# Patient Record
Sex: FEMALE | Race: WHITE | NOT HISPANIC OR LATINO | Employment: FULL TIME | URBAN - NONMETROPOLITAN AREA
[De-identification: names, ages, dates, MRNs, and addresses within clinical notes are randomized per-mention and may not be internally consistent; named-entity substitution may affect disease eponyms.]

---

## 2017-01-20 ENCOUNTER — TELEPHONE (OUTPATIENT)
Dept: UROLOGY | Facility: CLINIC | Age: 42
End: 2017-01-20

## 2017-01-20 NOTE — TELEPHONE ENCOUNTER
----- Message from Susan Hartman sent at 1/20/2017 12:47 PM CST -----  Contact: PATIENT  PATIENT CALLED FOR HER RESULTS ON HER 24 HOUR URINE AND ALSO HER RESULTS FROM HER KIDNEY STONE ANALYSIS. SHE ALSO WANTED TO KNOW WHEN SHE NEEDED TO SCHEDULE A FOLLOWUP. I LOOKED AT HER LAST OFFICE VISIT AND IT SAID A YEAR BUT THE RESULTS HAVE BEEN SINCE THEN. SHE IS A [facility] PATIENT.

## 2017-01-20 NOTE — TELEPHONE ENCOUNTER
I spoke with the patient to advised that stones were calcium oxalate, went over dietary recommendations and advised the patient that she will follow up in a year unless she experiences any hematuria, fever, recurrent UTI's, flank pain.   Patient verbalized understanding. She stated that all questions were answered. Patient was advised to call our office if she had any further questions or concerns. I will mail her a copy of the dietary recommendations.

## 2017-01-31 ENCOUNTER — OFFICE VISIT (OUTPATIENT)
Dept: OBSTETRICS AND GYNECOLOGY | Facility: CLINIC | Age: 42
End: 2017-01-31

## 2017-01-31 VITALS
HEIGHT: 63 IN | SYSTOLIC BLOOD PRESSURE: 126 MMHG | WEIGHT: 222 LBS | BODY MASS INDEX: 39.34 KG/M2 | DIASTOLIC BLOOD PRESSURE: 84 MMHG

## 2017-01-31 DIAGNOSIS — R10.2 PELVIC PAIN: ICD-10-CM

## 2017-01-31 DIAGNOSIS — N80.9 ENDOMETRIOSIS: ICD-10-CM

## 2017-01-31 DIAGNOSIS — N92.0 MENORRHAGIA WITH REGULAR CYCLE: Primary | ICD-10-CM

## 2017-01-31 PROCEDURE — 99213 OFFICE O/P EST LOW 20 MIN: CPT | Performed by: OBSTETRICS & GYNECOLOGY

## 2017-01-31 NOTE — MR AVS SNAPSHOT
Breonna Bellamy   1/31/2017 11:15 AM   Office Visit    Dept Phone:  [phone]   Encounter #:  [id number]    Provider:  Jonathan Figueroa MD   Department:  [facility]                Your Full Care Plan              Your Updated Medication List          This list is accurate as of: 1/31/17 11:37 AM.  Always use your most recent med list.                dicyclomine 20 MG tablet   Commonly known as:  BENTYL       LORazepam 0.5 MG tablet   Commonly known as:  ATIVAN       omeprazole 20 MG capsule   Commonly known as:  priLOSEC       oxyCODONE-acetaminophen 7.5-325 MG per tablet   Commonly known as:  PERCOCET   Take 1-2 tablets by mouth every 4 (four) hours as needed (Pain).       propranolol 80 MG tablet   Commonly known as:  INDERAL       sucralfate 1 G tablet   Commonly known as:  CARAFATE       WELCHOL 625 MG tablet   Generic drug:  colesevelam               You Were Diagnosed With        Codes Comments    Menorrhagia with regular cycle    -  Primary ICD-10-CM: N92.0  ICD-9-CM: 626.2 Resolved after hysterectomy    Pelvic pain     ICD-10-CM: R10.2  ICD-9-CM: KFR7852 IMproved significantly    Endometriosis     ICD-10-CM: N80.9  ICD-9-CM: 617.9 Although ovaries left in situ, no symptoms of this currently.  Op note reviewed and adnexa adherent.  Hopefully, removal will never be indicated.      Instructions     None    Patient Instructions History      Upcoming Appointments     Visit Type Date Time Department    OFFICE VISIT 1/31/2017 11:15 AM [facility]    [facility]28/2017  3:00 [facility] [place] [facility]      Nu-B-2B Signup     [facility] Nu-B-2B allows you to send messages to your doctor, view your test results, renew your prescriptions, schedule appointments, and more. To sign up, go to "[employer] and click on the Sign Up Now link in the New User? box. Enter your Nu-B-2B Activation Code exactly as it appears below along with the last four digits of  "your Social Security Number and your Date of Birth () to complete the sign-up process. If you do not sign up before the expiration date, you must request a new code.    Univisiont Activation Code: [email or web address]  Expires: 2017 11:36 AM    If you have questions, you can email [email or web address] or call [phone] to talk to our Univ[employer] staff. Remember, [employer] is NOT to be used for urgent needs. For medical emergencies, dial 911.               Other Info from Your Visit           Your Appointments     Sep 28, 2017  3:00 PM CDT   Physical with Mary R Carrell, APRN   [facility] (--)    [address] [place] [id number] [phone]           Arrive 15 minutes prior to appointment.              Allergies     Other  Swelling    AVOCADO- EAR AND THROAT SWELLING    Sulfa Antibiotics  Anaphylaxis    Fish-derived Products  Hives, Nausea And Vomiting    Procardia [Nifedipine]  Swelling, Rash      Reason for Visit     Menstrual Problem Pt had her surgery last 2016 and is doing great. She voices no problems/complaints      Vital Signs     Blood Pressure Height Weight Last Menstrual Period Breastfeeding? Body Mass Index    126/84 63\" (160 cm) 222 lb (101 kg) 2016 No 39.33 kg/m2    Smoking Status                   Never Smoker           Problems and Diagnoses Noted     Endometriosis    Menorrhagia with regular cycle    -  Primary    Pelvic pain            "

## 2017-01-31 NOTE — PROGRESS NOTES
Subjective   Breonna Bellamy is a 41 y.o. female is here today for follow-up.  No dyspareunia or post coital bleeding.  Resolution of pelvic pain.  No MP symptoms.    Menstrual Problem   This is a chronic problem. The current episode started more than 1 year ago. The problem occurs rarely. The problem has been resolved. Pertinent negatives include no abdominal pain, anorexia, arthralgias, change in bowel habit, chest pain, chills, congestion, coughing, diaphoresis, fatigue, fever, headaches, joint swelling, myalgias, nausea, neck pain, numbness, rash, sore throat, swollen glands, urinary symptoms, vertigo, visual change, vomiting or weakness. Nothing aggravates the symptoms. Treatments tried: DVH. The treatment provided significant relief.       The following portions of the patient's history were reviewed and updated as appropriate: allergies, current medications, past family history, past medical history, past social history, past surgical history and problem list.    Review of Systems   Constitutional: Negative for chills, diaphoresis, fatigue and fever.   HENT: Negative for congestion and sore throat.    Eyes: Negative for photophobia and visual disturbance.   Respiratory: Negative for cough.    Cardiovascular: Negative for chest pain.   Gastrointestinal: Negative for abdominal pain, anorexia, change in bowel habit, nausea and vomiting.   Endocrine: Negative for cold intolerance and heat intolerance.   Genitourinary: Negative for dyspareunia, menstrual problem and pelvic pain.   Musculoskeletal: Negative for arthralgias, joint swelling, myalgias and neck pain.   Skin: Negative for rash.   Neurological: Negative for vertigo, weakness, numbness and headaches.   Psychiatric/Behavioral: Negative for confusion, decreased concentration and dysphoric mood.       Objective   Physical Exam   Constitutional: She is oriented to person, place, and time. She appears well-developed and well-nourished.   Neck: Normal range of  motion. Neck supple.   Cardiovascular: Normal rate.    Abdominal:       LSC incisions well healed without evidence of hernia   Neurological: She is alert and oriented to person, place, and time.   Skin: Skin is warm and dry.   Psychiatric: She has a normal mood and affect. Her behavior is normal. Judgment and thought content normal.         Assessment/Plan   Breonna was seen today for menstrual problem.    Diagnoses and all orders for this visit:    Menorrhagia with regular cycle  Comments:  Resolved after hysterectomy    Pelvic pain  Comments:  IMproved significantly    Endometriosis  Comments:  Although ovaries left in situ, no symptoms of this currently.  Op note reviewed and adnexa adherent.  Hopefully, removal will never be indicated.        Jonathan Figueroa MD

## 2017-02-02 ENCOUNTER — RESULTS ENCOUNTER (OUTPATIENT)
Dept: UROLOGY | Facility: CLINIC | Age: 42
End: 2017-02-02

## 2017-02-02 DIAGNOSIS — R10.9 FLANK PAIN: ICD-10-CM

## 2017-02-02 DIAGNOSIS — N20.0 RECURRENT KIDNEY STONES: ICD-10-CM

## 2017-08-29 ENCOUNTER — OFFICE VISIT (OUTPATIENT)
Dept: OTOLARYNGOLOGY | Facility: CLINIC | Age: 42
End: 2017-08-29

## 2017-08-29 VITALS
TEMPERATURE: 97.8 F | HEART RATE: 80 BPM | BODY MASS INDEX: 40.93 KG/M2 | SYSTOLIC BLOOD PRESSURE: 130 MMHG | HEIGHT: 63 IN | WEIGHT: 231 LBS | DIASTOLIC BLOOD PRESSURE: 78 MMHG

## 2017-08-29 DIAGNOSIS — E66.01 MORBID OBESITY WITH BMI OF 40.0-44.9, ADULT (HCC): ICD-10-CM

## 2017-08-29 DIAGNOSIS — R13.14 PHARYNGOESOPHAGEAL DYSPHAGIA: ICD-10-CM

## 2017-08-29 DIAGNOSIS — K21.9 LARYNGOPHARYNGEAL REFLUX: Primary | ICD-10-CM

## 2017-08-29 PROCEDURE — 31575 DIAGNOSTIC LARYNGOSCOPY: CPT | Performed by: OTOLARYNGOLOGY

## 2017-08-29 PROCEDURE — 99214 OFFICE O/P EST MOD 30 MIN: CPT | Performed by: OTOLARYNGOLOGY

## 2017-08-29 RX ORDER — BUSPIRONE HYDROCHLORIDE 10 MG/1
10 TABLET ORAL AS NEEDED
COMMUNITY

## 2017-08-29 NOTE — PROGRESS NOTES
YOB: 1975  Location: [facility]  Location Address: [address] [place] [address], [facility], [address] [place] [address]  Location Phone: [phone]    Chief Complaint   Patient presents with   • Lymph nodes     enlarged, gets choked       History of Present Illness  Breonna Bellamy is a 42 y.o. female.  Breonna Bellamy is here for evaluation of ENT complaints. The patient has had problems with enlarged lymph nodes. She also has complaints of dysphagia, globus sensation, hoarseness and severe reflux disease. Patient denies sore throat, cough, nasal congestion, nasal drainage and neck tenderness and swelling. She states she has a history of IBS which irritates the reflux. Patient has had a CT scan done.     Patient reports gaining approximately 60 pounds back after her hysterectomy last year.  This is when most of her difficulties began.  However since that time she has still been working with a  twice a week and despite her efforts been unable to lose weight after her hysterectomy.  She is taking her nighttime dose of PPI prior to bedtime.        CT reviewed-No significant masses or lesions noted.         Past Medical History:   Diagnosis Date   • Abnormal uterine bleeding    • Dysmenorrhea    • Hypertension    • IBS (irritable bowel syndrome)    • IBS (irritable bowel syndrome)    • Infertility, female    • Irregular menstruation    • Kidney stones    • Kidney stones    • LGSIL (low grade squamous intraepithelial dysplasia)    • Polycystic ovarian disease    • PONV (postoperative nausea and vomiting)    • Spontaneous      x2   • Tachycardia        Past Surgical History:   Procedure Laterality Date   • CHOLECYSTECTOMY     • COLONOSCOPY     • CYSTOSCOPY N/A 2016    Procedure: CYSTOSCOPY;  Surgeon: Jonathan Figueroa MD;  Location: [facility];  Service:    • CYSTOSCOPY ELECTROHYDRAULIC LITHOTRIPSY     • EYE SURGERY      LASIK   • HYSTEROSCOPY ENDOMETRIAL ABLATION      X2   • KIDNEY STONE SURGERY      removed    • TOTAL LAPAROSCOPIC HYSTERECTOMY N/A 9/19/2016    Procedure: TOTAL LAPAROSCOPIC HYSTERECTOMY WITH DAVINCI SI ROBOT WITH CYSTO;  Surgeon: Jonathan Figueroa MD;  Location: [facility];  Service:          Current Outpatient Prescriptions:   •  busPIRone (BUSPAR) 10 MG tablet, Take 10 mg by mouth As Needed., Disp: , Rfl:   •  colesevelam (WELCHOL) 625 MG tablet, Take 1,875 mg by mouth as needed., Disp: , Rfl:   •  dicyclomine (BENTYL) 20 MG tablet, Take 40 mg by mouth every night., Disp: , Rfl:   •  omeprazole (PriLOSEC) 20 MG capsule, Take 20 mg by mouth 2 (two) times a day as needed., Disp: , Rfl:   •  propranolol (INDERAL) 80 MG tablet, Take 80 mg by mouth 3 (three) times a day., Disp: , Rfl:   •  sucralfate (CARAFATE) 1 G tablet, Take 1 g by mouth as needed., Disp: , Rfl:     Other; Sulfa antibiotics; Fish-derived products; and Procardia [nifedipine]    Family History   Problem Relation Age of Onset   • Anesthesia problems Mother    • Anesthesia problems Maternal Grandmother    • Colon cancer Paternal Grandmother    • Liver cancer Paternal Grandfather    • Ovarian cancer Neg Hx    • Breast cancer Neg Hx        Social History     Social History   • Marital status:      Spouse name: N/A   • Number of children: N/A   • Years of education: N/A     Occupational History   • Not on file.     Social History Main Topics   • Smoking status: Never Smoker   • Smokeless tobacco: Not on file   • Alcohol use No      Comment: occasional   • Drug use: No   • Sexual activity: Not Currently     Other Topics Concern   • Not on file     Social History Narrative       Review of Systems   Constitutional: Negative.    HENT: Positive for trouble swallowing and voice change.         Globus sensation   Eyes: Negative.    Respiratory: Positive for choking.    Cardiovascular: Negative.    Gastrointestinal: Negative.    Endocrine: Negative.    Genitourinary: Negative.    Musculoskeletal: Negative.    Skin: Negative.     Allergic/Immunologic: Negative.    Neurological: Negative.    Hematological: Negative.    Psychiatric/Behavioral: Negative.        Vitals:    08/29/17 1308   BP: 130/78   Pulse: 80   Temp: 97.8 °F (36.6 °C)       Objective     Physical Exam  CONSTITUTIONAL: Morbidly obese, alert, oriented, in no acute distress     COMMUNICATION AND VOICE: able to communicate normally, normal voice quality    HEAD: normocephalic, no lesions, atraumatic, no tenderness, no masses     FACE: appearance normal, no lesions, no tenderness, no deformities, facial motion symmetric    SALIVARY GLANDS: parotid glands with no tenderness, no swelling, no masses, submandibular glands with normal size, nontender    EYES: ocular motility normal, eyelids normal, orbits normal, no proptosis, conjunctiva normal , pupils equal, round     EARS:  Hearing: response to conversational voice normal bilaterally   External Ears: auricles without lesions  Otoscopic: tympanic membrane appearance normal, no lesions, no perforation, normal mobility, no fluid    NOSE:  External Nose: structure normal, no tenderness on palpation, no nasal discharge, no lesions, no evidence of trauma, nostrils patent   Intranasal Exam: nasal mucosa normal, vestibule within normal limits, inferior turbinate normal, nasal septum midline   Nasopharynx:     ORAL:  Lips: upper and lower lips without lesion   Teeth: dentition within normal limits for age   Gums: gingivae healthy   Oral Mucosa: oral mucosa normal, no mucosal lesions   Floor of Mouth: Warthin’s duct patent, mucosa normal  Tongue: lingual mucosa normal without lesions, normal tongue mobility   Palate: soft and hard palates with normal mucosa and structure  Oropharynx: oropharyngeal mucosa normal    HYPOPHARYNX:   LARYNX: See endo    NECK: neck appearance normal, no mass,  noted without erythema or tenderness    THYROID: no overt thyromegaly, no tenderness, nodules or mass present on palpation, position midline     LYMPH  NODES: no lymphadenopathy    CHEST/RESPIRATORY: respiratory effort normal, normal breath sounds     CARDIOVASCULAR: rate and rhythm normal, extremities without cyanosis or edema      NEUROLOGIC/PSYCHIATRIC: oriented to time, place and person, mood normal, affect appropriate, CN II-XII intact grossly    Assessment/Plan   Breonna was seen today for lymph nodes.    Diagnoses and all orders for this visit:    Laryngopharyngeal reflux    Pharyngoesophageal dysphagia    Morbid obesity with BMI of 40.0-44.9, adult      * Surgery not found *  No orders of the defined types were placed in this encounter.    Return in about 3 months (around 11/29/2017) for Recheck.       Patient Instructions   Call or return for problems  Consider trying a elemental Paleo Diet -H/O    Risks of continued PPI as well as long-term were discussed  Change p.m. dose to before dinner rather than before bedtime  General reflux precautions      Recommendation was made to consider a Paleo Diet.      It Starts with Food - Mario and Suzanne Smith was recommended.  The basics of a Paleo diet was covered including a diet composed of meat, fruits and non-leguminous vegetables.  It is important to avoid all processed foods.  This requires that you not eat ANYTHING with a chemical preservative.    The Paleo lifestyle is about nourishing our bodies with real food that is grown and raised as nature intended, not manufactured in a facility.  It is about unplugging from the modern day electronics from time to time and giving your body a chance to actually rest.    It is important to stick very close to this diet for 6 weeks without significant cheating.  It allows you to experience the benefit of eating this way, giving your body time to detoxify.    Acceptable foods  Fresh Fish/seafood  Fresh meats-preferably grass-fed and free range  Fresh fruits  Fresh vegetables  Healthy fats-Coconut oil, olive oil, avocados etc.  Nuts/seeds    Foods to  avoid  Grains  Legumes  Processed foods  Soy  Refined sugar    Web sites include:  www.PrimalBody-PrimalMind.com  www.EverydayPaleo.SaveUp  www.IumeFansUnite.SaveUp  Www.PaleSan [place]cos Springs.SaveUp    Other Ariana Resources:  Bhargavi Mac  PrimalPaleo  Paleo [place]  Nourished      Other Recommended Books:  The Paleo Solution  Wheat Belly  Grain Brain  Primal Body/Primal Mind

## 2017-08-29 NOTE — PATIENT INSTRUCTIONS
Call or return for problems  Consider trying a elemental Paleo Diet -H/O    Risks of continued PPI as well as long-term were discussed  Change p.m. dose to before dinner rather than before bedtime  General reflux precautions      Recommendation was made to consider a Paleo Diet.      It Starts with Food - Mario and Suzanne Smith was recommended.  The basics of a Paleo diet was covered including a diet composed of meat, fruits and non-leguminous vegetables.  It is important to avoid all processed foods.  This requires that you not eat ANYTHING with a chemical preservative.    The Paleo lifestyle is about nourishing our bodies with real food that is grown and raised as nature intended, not manufactured in a facility.  It is about unplugging from the modern day electronics from time to time and giving your body a chance to actually rest.    It is important to stick very close to this diet for 6 weeks without significant cheating.  It allows you to experience the benefit of eating this way, giving your body time to detoxify.    Acceptable foods  Fresh Fish/seafood  Fresh meats-preferably grass-fed and free range  Fresh fruits  Fresh vegetables  Healthy fats-Coconut oil, olive oil, avocados etc.  Nuts/seeds    Foods to avoid  Grains  Legumes  Processed foods  Soy  Refined sugar    Web sites include:  www.PrimalBodyTau TherapeuticsPrimalMind.Extreme Reach  www.EverydayOregon Health & Science University.Extreme Reach  www.[employer]  Www.PaleProvasculon.Extreme Reach    Other Ariana Resources:  Bhargavi Mac  PrimalPalemarilu  Paleo [place]  Nourished      Other Recommended Books:  The Paleo Solution  Wheat Belly  Grain Brain  Primal Body/Primal Mind

## 2017-08-29 NOTE — PROGRESS NOTES
OPERATIVE NOTE:  Breonna Bellamy    DATE OF PROCEDURE: 08/29/2017    PROCEDURE:   Flexible Fiberoptic Laryngoscopy    ANESTHESIA:  None    REASON FOR PROCEDURE:  Procedure was recommend for globus sensation  Risks, benefits and alternatives were discussed.      DETAILS of OPERATION:  The patient was seated in the exam chair.  A flexible fiberoptic laryngoscopy was performed through the oral cavity.  The scope was introduced into the oral cavity and directed to the level of the glottis, examining the structures of the oropharynx, base of tongue, vallecula, supraglottic larynx, glottic larynx, and hypopharynx.      FINDINGS:  Mucosal surfaces:   The mucosal surfaces demonstrated normal mucosa surfaces with mild inflammation    Base of tongue:  The base of tongue was found to have no mass or lesion.    Epiglottis:  The epiglottis was found to have no mass or lesion.    Aryepligottic fold:  The AE folds were found to have no mass or lesion.    False Vocal Fold:  The false cords were found to have no mass or lesion.    True Vocal Cord:  The true vocal cords were found to have no mass or lesion.    Arytenoid:   The arytenoids were found to have mild to moderate inter-arytenoid edema with erythema.    Hypopharynx:  The hypopharynx was found to have no mass or lesion.    The patient tolerated procedure well.

## 2017-10-03 ENCOUNTER — OFFICE VISIT (OUTPATIENT)
Dept: OBSTETRICS AND GYNECOLOGY | Facility: CLINIC | Age: 42
End: 2017-10-03

## 2017-10-03 VITALS
SYSTOLIC BLOOD PRESSURE: 128 MMHG | DIASTOLIC BLOOD PRESSURE: 68 MMHG | BODY MASS INDEX: 40.75 KG/M2 | HEIGHT: 63 IN | WEIGHT: 230 LBS

## 2017-10-03 DIAGNOSIS — E55.9 VITAMIN D DEFICIENCY: ICD-10-CM

## 2017-10-03 DIAGNOSIS — Z12.31 ENCOUNTER FOR SCREENING MAMMOGRAM FOR MALIGNANT NEOPLASM OF BREAST: ICD-10-CM

## 2017-10-03 DIAGNOSIS — N95.1 MENOPAUSAL SYMPTOMS: ICD-10-CM

## 2017-10-03 DIAGNOSIS — Z01.419 ENCOUNTER FOR GYNECOLOGICAL EXAMINATION WITHOUT ABNORMAL FINDING: Primary | ICD-10-CM

## 2017-10-03 DIAGNOSIS — N94.10 DYSPAREUNIA, FEMALE: ICD-10-CM

## 2017-10-03 PROCEDURE — 87481 CANDIDA DNA AMP PROBE: CPT | Performed by: NURSE PRACTITIONER

## 2017-10-03 PROCEDURE — 99396 PREV VISIT EST AGE 40-64: CPT | Performed by: NURSE PRACTITIONER

## 2017-10-03 PROCEDURE — 87798 DETECT AGENT NOS DNA AMP: CPT | Performed by: NURSE PRACTITIONER

## 2017-10-03 PROCEDURE — 87512 GARDNER VAG DNA QUANT: CPT | Performed by: NURSE PRACTITIONER

## 2017-10-03 PROCEDURE — 87661 TRICHOMONAS VAGINALIS AMPLIF: CPT | Performed by: NURSE PRACTITIONER

## 2017-10-03 RX ORDER — MELATONIN
1000 DAILY
COMMUNITY

## 2017-10-03 RX ORDER — PROPRANOLOL HYDROCHLORIDE 80 MG/1
CAPSULE, EXTENDED RELEASE ORAL
COMMUNITY
Start: 2017-09-06 | End: 2021-07-23

## 2017-10-03 NOTE — PROGRESS NOTES
"Subjective   Breonna Bellamy is a 42 y.o. female.     HPI Comments: Annual exam.     Mood swings, PMS, breast tenderness, and decreased sex drive.     The following portions of the patient's history were reviewed and updated as appropriate: allergies, current medications, past family history, past medical history, past social history, past surgical history and problem list.    /68 (BP Location: Left arm, Patient Position: Sitting)  Ht 63\" (160 cm)  Wt 230 lb (104 kg)  LMP 09/13/2016 Comment: negative hcg noted to chart  BMI 40.74 kg/m2    Review of Systems   Constitutional: Negative for activity change, appetite change, fatigue and fever.   HENT: Negative for congestion and trouble swallowing.    Eyes: Negative for pain, discharge and visual disturbance.   Respiratory: Negative for apnea, shortness of breath and wheezing.    Cardiovascular: Negative for chest pain, palpitations and leg swelling.   Gastrointestinal: Positive for diarrhea (IBS, followed by PCP). Negative for abdominal pain and constipation.   Genitourinary: Positive for dyspareunia (for the last 6 months, vaginal dryness) and vaginal discharge (for approx 6 months). Negative for dysuria, frequency, pelvic pain and urgency.        Breast tenderness, decreased sex drive   Musculoskeletal: Negative for back pain and gait problem.   Skin: Negative for color change and rash.   Neurological: Negative for dizziness, weakness and numbness.   Psychiatric/Behavioral: Negative for confusion, self-injury, sleep disturbance and suicidal ideas. The patient is nervous/anxious (takes Buspar, prescribed by Dr. Hernandez).         PMS, mood swings.        Objective   Physical Exam   Constitutional: She is oriented to person, place, and time. She appears well-developed and well-nourished.   HENT:   Head: Normocephalic and atraumatic.   Right Ear: External ear normal.   Left Ear: External ear normal.   Eyes: Conjunctivae and EOM are normal. Right eye exhibits no " discharge. Left eye exhibits no discharge. No scleral icterus.   Neck: Normal range of motion. Neck supple. Carotid bruit is not present. No thyromegaly present.   Cardiovascular: Regular rhythm and normal heart sounds.    No murmur heard.  Pulmonary/Chest: Effort normal and breath sounds normal. No respiratory distress. Right breast exhibits no inverted nipple, no mass, no nipple discharge, no skin change and no tenderness. Left breast exhibits no inverted nipple, no mass, no nipple discharge, no skin change and no tenderness. Breasts are symmetrical. There is no breast swelling.   Abdominal: Soft. Bowel sounds are normal. She exhibits no distension and no mass. There is no tenderness. There is no guarding. No hernia. Hernia confirmed negative in the right inguinal area and confirmed negative in the left inguinal area.   Genitourinary: Rectal exam shows no mass. No breast tenderness, discharge or bleeding. There is no rash, tenderness, lesion or injury on the right labia. There is no rash, tenderness, lesion or injury on the left labia. No erythema, tenderness or bleeding in the vagina. No signs of injury around the vagina. Vaginal discharge found.   Genitourinary Comments: Cervix and uterus are surgically absent.  Urethra and urethral meatus normal  Bladder, normal no prolapse  Perineum and anus examined and without lesions   Musculoskeletal: Normal range of motion. She exhibits no edema or tenderness.   Lymphadenopathy:        Head (right side): No submental, no submandibular, no tonsillar, no preauricular, no posterior auricular and no occipital adenopathy present.        Head (left side): No submental, no submandibular, no tonsillar, no preauricular, no posterior auricular and no occipital adenopathy present.     She has no cervical adenopathy.        Right cervical: No superficial cervical, no deep cervical and no posterior cervical adenopathy present.       Left cervical: No superficial cervical, no deep  cervical and no posterior cervical adenopathy present.     She has no axillary adenopathy.        Right: No inguinal adenopathy present.        Left: No inguinal adenopathy present.   Neurological: She is alert and oriented to person, place, and time. She exhibits normal muscle tone. Coordination normal.   Skin: Skin is warm and dry. No bruising and no rash noted. No erythema.   Psychiatric: She has a normal mood and affect. Her behavior is normal. Judgment and thought content normal.   Nursing note and vitals reviewed.      Assessment/Plan    Well woman exam. Specimen collected for BV panel. Mammogram ordered. Annual and hormone labs ordered.     Discussed pt increased stress, decreased sex drive and moodiness. Encouraged pt to take Buspar as prescribed by PCP.   Discussed proper amount of sleep, exercise and time for herself in order for her to have the energy to care for others.   Pt voiced understanding.     Further treatment based on results.   RV annual exam/prn  Breonna was seen today for gynecologic exam.    Diagnoses and all orders for this visit:    Encounter for gynecological examination without abnormal finding  -     CBC & Differential  -     Comprehensive Metabolic Panel  -     Hemoglobin A1c  -     Lipid Panel With LDL / HDL Ratio  -     T4, Free  -     TSH    Menopausal symptoms  -     Cortisol  -     DHEA-Sulfate  -     Follicle Stimulating Hormone  -     Estradiol  -     Luteinizing Hormone  -     Testosterone  -     Progesterone  -     Gynecologic Fluid, Supplemental Testing - ThinPrep Vial, Cervix    Dyspareunia, female  -     Gynecologic Fluid, Supplemental Testing - ThinPrep Vial, Cervix    Vitamin D deficiency  -     Vitamin D 25 Hydroxy    Encounter for screening mammogram for malignant neoplasm of breast  -     Mammo Screening Digital Tomosynthesis Bilateral With CAD; Future

## 2017-10-04 LAB
25(OH)D3+25(OH)D2 SERPL-MCNC: 19.1 NG/ML (ref 30–100)
ALBUMIN SERPL-MCNC: 4.2 G/DL (ref 3.5–5)
ALBUMIN/GLOB SERPL: 1.2 G/DL (ref 1.1–2.5)
ALP SERPL-CCNC: 80 U/L (ref 24–120)
ALT SERPL-CCNC: 28 U/L (ref 0–54)
AST SERPL-CCNC: 20 U/L (ref 7–45)
BASOPHILS # BLD AUTO: 0.03 10*3/MM3 (ref 0–0.2)
BASOPHILS NFR BLD AUTO: 0.2 % (ref 0–2)
BILIRUB SERPL-MCNC: 0.3 MG/DL (ref 0.1–1)
BUN SERPL-MCNC: 12 MG/DL (ref 5–21)
BUN/CREAT SERPL: 15.2 (ref 7–25)
CALCIUM SERPL-MCNC: 9.7 MG/DL (ref 8.4–10.4)
CHLORIDE SERPL-SCNC: 103 MMOL/L (ref 98–110)
CHOLEST SERPL-MCNC: 207 MG/DL (ref 130–200)
CO2 SERPL-SCNC: 25 MMOL/L (ref 24–31)
CORTIS SERPL-MCNC: 9.5 UG/DL
CREAT SERPL-MCNC: 0.79 MG/DL (ref 0.5–1.4)
DHEA-S SERPL-MCNC: 122.5 UG/DL (ref 57.3–279.2)
EOSINOPHIL # BLD AUTO: 0.17 10*3/MM3 (ref 0–0.7)
EOSINOPHIL NFR BLD AUTO: 1.3 % (ref 0–4)
ERYTHROCYTE [DISTWIDTH] IN BLOOD BY AUTOMATED COUNT: 14 % (ref 12–15)
ESTRADIOL SERPL-MCNC: 77.8 PG/ML
FSH SERPL-ACNC: 6.9 MIU/ML
GLOBULIN SER CALC-MCNC: 3.5 GM/DL
GLUCOSE SERPL-MCNC: 93 MG/DL (ref 70–100)
HBA1C MFR BLD: 5.8 %
HCT VFR BLD AUTO: 41.2 % (ref 37–47)
HDLC SERPL-MCNC: 45 MG/DL
HGB BLD-MCNC: 13.3 G/DL (ref 12–16)
IMM GRANULOCYTES # BLD: 0.07 10*3/MM3 (ref 0–0.03)
IMM GRANULOCYTES NFR BLD: 0.5 % (ref 0–5)
LDLC SERPL CALC-MCNC: 94 MG/DL (ref 0–99)
LDLC/HDLC SERPL: 2.09 {RATIO}
LH SERPL-ACNC: 8.1 MIU/ML
LYMPHOCYTES # BLD AUTO: 3.37 10*3/MM3 (ref 0.72–4.86)
LYMPHOCYTES NFR BLD AUTO: 26 % (ref 15–45)
MCH RBC QN AUTO: 27.8 PG (ref 28–32)
MCHC RBC AUTO-ENTMCNC: 32.3 G/DL (ref 33–36)
MCV RBC AUTO: 86 FL (ref 82–98)
MONOCYTES # BLD AUTO: 0.65 10*3/MM3 (ref 0.19–1.3)
MONOCYTES NFR BLD AUTO: 5 % (ref 4–12)
NEUTROPHILS # BLD AUTO: 8.67 10*3/MM3 (ref 1.87–8.4)
NEUTROPHILS NFR BLD AUTO: 67 % (ref 39–78)
PLATELET # BLD AUTO: 375 10*3/MM3 (ref 130–400)
POTASSIUM SERPL-SCNC: 4.4 MMOL/L (ref 3.5–5.3)
PROGEST SERPL-MCNC: 0.4 NG/ML
PROT SERPL-MCNC: 7.7 G/DL (ref 6.3–8.7)
RBC # BLD AUTO: 4.79 10*6/MM3 (ref 4.2–5.4)
SODIUM SERPL-SCNC: 140 MMOL/L (ref 135–145)
T4 FREE SERPL-MCNC: 1.4 NG/DL (ref 0.78–2.19)
TESTOST SERPL-MCNC: 34 NG/DL (ref 8–48)
TRIGL SERPL-MCNC: 340 MG/DL (ref 0–149)
TSH SERPL DL<=0.005 MIU/L-ACNC: 0.75 MIU/ML (ref 0.47–4.68)
VLDLC SERPL CALC-MCNC: 68 MG/DL
WBC # BLD AUTO: 12.96 10*3/MM3 (ref 4.8–10.8)

## 2017-10-04 RX ORDER — ERGOCALCIFEROL 1.25 MG/1
50000 CAPSULE ORAL WEEKLY
Qty: 30 CAPSULE | Refills: 0 | Status: SHIPPED | OUTPATIENT
Start: 2017-10-04 | End: 2018-10-18 | Stop reason: SDUPTHER

## 2017-10-05 LAB
GEN CATEG CVX/VAG CYTO-IMP: NORMAL
LAB AP CASE REPORT: NORMAL
Lab: NORMAL
PATH INTERP SPEC-IMP: NORMAL
STAT OF ADQ CVX/VAG CYTO-IMP: NORMAL

## 2017-10-09 RX ORDER — FLUCONAZOLE 150 MG/1
150 TABLET ORAL 2 TIMES WEEKLY
Qty: 2 TABLET | Refills: 0 | Status: SHIPPED | OUTPATIENT
Start: 2017-10-09 | End: 2018-02-05

## 2017-11-28 ENCOUNTER — TELEPHONE (OUTPATIENT)
Dept: OBSTETRICS AND GYNECOLOGY | Facility: CLINIC | Age: 42
End: 2017-11-28

## 2017-11-28 ENCOUNTER — HOSPITAL ENCOUNTER (OUTPATIENT)
Dept: MAMMOGRAPHY | Facility: HOSPITAL | Age: 42
Discharge: HOME | End: 2017-11-28
Admitting: NURSE PRACTITIONER

## 2017-11-28 DIAGNOSIS — Z12.31 ENCOUNTER FOR SCREENING MAMMOGRAM FOR MALIGNANT NEOPLASM OF BREAST: ICD-10-CM

## 2017-11-28 PROCEDURE — 77063 BREAST TOMOSYNTHESIS BI: CPT

## 2017-11-28 PROCEDURE — G0202 SCR MAMMO BI INCL CAD: HCPCS

## 2017-11-28 NOTE — TELEPHONE ENCOUNTER
Called pt to advise her that she came in to early to have her labs drawn,  She will need to come in January to have her labs drawn  The labs that was drawn today will not show if there has been any changes.

## 2018-02-05 ENCOUNTER — HOSPITAL ENCOUNTER (OUTPATIENT)
Dept: GENERAL RADIOLOGY | Facility: HOSPITAL | Age: 43
Discharge: HOME | End: 2018-02-05
Attending: UROLOGY | Admitting: UROLOGY

## 2018-02-05 ENCOUNTER — OFFICE VISIT (OUTPATIENT)
Dept: UROLOGY | Facility: CLINIC | Age: 43
End: 2018-02-05

## 2018-02-05 VITALS — HEIGHT: 63 IN | TEMPERATURE: 97.1 F | WEIGHT: 227 LBS | BODY MASS INDEX: 40.22 KG/M2

## 2018-02-05 DIAGNOSIS — N20.0 RECURRENT KIDNEY STONES: Primary | ICD-10-CM

## 2018-02-05 LAB
BILIRUB BLD-MCNC: NEGATIVE MG/DL
CLARITY, POC: CLEAR
COLOR UR: YELLOW
GLUCOSE UR STRIP-MCNC: NEGATIVE MG/DL
KETONES UR QL: NEGATIVE
LEUKOCYTE EST, POC: NEGATIVE
NITRITE UR-MCNC: NEGATIVE MG/ML
PH UR: 5.5 [PH] (ref 5–8)
PROT UR STRIP-MCNC: NEGATIVE MG/DL
RBC # UR STRIP: NEGATIVE /UL
SP GR UR: 1.02 (ref 1–1.03)
UROBILINOGEN UR QL: NORMAL

## 2018-02-05 PROCEDURE — 74018 RADEX ABDOMEN 1 VIEW: CPT

## 2018-02-05 PROCEDURE — 81003 URINALYSIS AUTO W/O SCOPE: CPT | Performed by: UROLOGY

## 2018-02-05 PROCEDURE — 99213 OFFICE O/P EST LOW 20 MIN: CPT | Performed by: UROLOGY

## 2018-02-05 NOTE — PROGRESS NOTES
Ms. Bellamy is 42 y.o. female    CHIEF COMPLAINT: I am here to follow up on my kidney stones. I am having right flank pain    HPI  Renal Urolithiasis  The patient presents with right renal urolithiasis. This was initially diagnosed approximately 6 months ago. This was identified on CT that included abdominal cuts. This was found in the context of an evaluation of microscopic hematuria. This is a(n) established problem for the patient. The onset of this was unknown. The course is stable. The patient is currently being managed by dietary alterations and hydration. Current symptoms that may or may not be related to this stone include none reported.    The following portions of the patient's history were reviewed and updated as appropriate: allergies, current medications, past family history, past medical history, past social history, past surgical history and problem list.    Review of Systems   Constitutional: Negative for chills and fever.   Gastrointestinal: Negative for abdominal pain, anal bleeding and blood in stool.   Genitourinary: Negative for flank pain and hematuria.         Current Outpatient Prescriptions:   •  busPIRone (BUSPAR) 10 MG tablet, Take 10 mg by mouth As Needed., Disp: , Rfl:   •  cholecalciferol (VITAMIN D3) 1000 units tablet, Take 1,000 Units by mouth Daily., Disp: , Rfl:   •  colesevelam (WELCHOL) 625 MG tablet, Take 1,875 mg by mouth as needed., Disp: , Rfl:   •  dicyclomine (BENTYL) 20 MG tablet, Take 40 mg by mouth every night., Disp: , Rfl:   •  omeprazole (PriLOSEC) 20 MG capsule, Take 20 mg by mouth 2 (two) times a day as needed., Disp: , Rfl:   •  propranolol LA (INDERAL LA) 80 MG 24 hr capsule, , Disp: , Rfl:   •  sucralfate (CARAFATE) 1 G tablet, Take 1 g by mouth as needed., Disp: , Rfl:   •  vitamin D (ERGOCALCIFEROL) 49008 units capsule capsule, Take 1 capsule by mouth 1 (One) Time Per Week., Disp: 30 capsule, Rfl: 0    Past Medical History:   Diagnosis Date   • Abnormal uterine  "bleeding    • Dysmenorrhea    • Hypertension    • IBS (irritable bowel syndrome)    • IBS (irritable bowel syndrome)    • IBS (irritable bowel syndrome)    • Infertility, female    • Irregular menstruation    • Kidney stones    • Kidney stones    • LGSIL (low grade squamous intraepithelial dysplasia)    • Polycystic ovarian disease    • PONV (postoperative nausea and vomiting)    • Spontaneous      x2   • Tachycardia        Past Surgical History:   Procedure Laterality Date   • CHOLECYSTECTOMY     • COLONOSCOPY     • CYSTOSCOPY N/A 2016    Procedure: CYSTOSCOPY;  Surgeon: Jonathan Figueroa MD;  Location: [facility];  Service:    • CYSTOSCOPY ELECTROHYDRAULIC LITHOTRIPSY     • EYE SURGERY      LASIK   • HYSTEROSCOPY ENDOMETRIAL ABLATION      X2   • KIDNEY STONE SURGERY      removed   • TOTAL LAPAROSCOPIC HYSTERECTOMY N/A 2016    Procedure: TOTAL LAPAROSCOPIC HYSTERECTOMY WITH DAVINCI SI ROBOT WITH CYSTO;  Surgeon: Jonathan Figueroa MD;  Location: [facility];  Service:        Social History     Social History   • Marital status:      Spouse name: N/A   • Number of children: N/A   • Years of education: N/A     Social History Main Topics   • Smoking status: Never Smoker   • Smokeless tobacco: Never Used   • Alcohol use No      Comment: occasional   • Drug use: No   • Sexual activity: Yes     Partners: Male     Birth control/ protection: None     Other Topics Concern   • None     Social History Narrative       Family History   Problem Relation Age of Onset   • Anesthesia problems Mother    • Anesthesia problems Maternal Grandmother    • Colon cancer Paternal Grandmother    • Liver cancer Paternal Grandfather    • Ovarian cancer Neg Hx    • Breast cancer Neg Hx        Temp 97.1 °F (36.2 °C)  Ht 160 cm (63\")  Wt 103 kg (227 lb)  LMP 2016 Comment: negative hcg noted to chart  BMI 40.21 kg/m2    Physical Exam  Alert and oriented ×3  Not agitated or distressed  No obvious deformities  No " respiratory distress  Skin without pallor or diaphoresis      Results for orders placed or performed in visit on 02/05/18   POC Urinalysis Dipstick, Automated   Result Value Ref Range    Color Yellow Yellow, Straw, Dark Yellow, Brenda    Clarity, UA Clear Clear    Glucose, UA Negative Negative, 1000 mg/dL (3+) mg/dL    Bilirubin Negative Negative    Ketones, UA Negative Negative    Specific Gravity  1.025 1.005 - 1.030    Blood, UA Negative Negative    pH, Urine 5.5 5.0 - 8.0    Protein, POC Negative Negative mg/dL    Urobilinogen, UA Normal Normal    Leukocytes Negative Negative    Nitrite, UA Negative Negative   Independent visualization/interpretation of KUB  A KUB is available for me to review today.  The image is inspected for a bowel gas pattern and the general bone structure of the spine and pelvis. The kidneys are then inspected closely.  Renal outline is noted if identifiable. The kidney, collecting system, and anticipated path of the ureter are examined for calcifications including those in the true pelvis.  This film reveals: a pair small stones in the polar aspects of the right kidney.  The left kidney is without stone.        Assessment and Plan  Diagnoses and all orders for this visit:    Recurrent kidney stones  -     POC Urinalysis Dipstick, Automated  -     XR Abdomen KUB  -     XR Abdomen KUB; Future    The patient has been diagnosed with renal urolithiasis. Location is described as  right. The patient's options for therapy are discussed based on the size, location, stone burden, and symptoms.  The decision has been made to follow these stones radiographically without treatment.  The patient understands the risks associated with the conservative approach, but this is a reasonable treatment plan.  The need to contract me for hematuria, fever, recurrent UTI's, flank pain or change in ideology is explained.    Established problem,stable,chronic      Rony Bañuelos MD  02/05/18  10:54 AM      Cc:  Antoine Hernandez, DO

## 2018-02-12 ENCOUNTER — TELEPHONE (OUTPATIENT)
Dept: UROLOGY | Facility: CLINIC | Age: 43
End: 2018-02-12

## 2018-09-25 ENCOUNTER — TRANSCRIBE ORDERS (OUTPATIENT)
Dept: ADMINISTRATIVE | Facility: HOSPITAL | Age: 43
End: 2018-09-25

## 2018-09-25 DIAGNOSIS — R06.02 SOB (SHORTNESS OF BREATH): Primary | ICD-10-CM

## 2018-09-25 DIAGNOSIS — R07.89 OTHER CHEST PAIN: ICD-10-CM

## 2018-09-27 ENCOUNTER — HOSPITAL ENCOUNTER (OUTPATIENT)
Dept: CARDIOLOGY | Facility: HOSPITAL | Age: 43
Discharge: HOME | End: 2018-09-27
Admitting: NURSE PRACTITIONER

## 2018-09-27 VITALS
SYSTOLIC BLOOD PRESSURE: 137 MMHG | BODY MASS INDEX: 37.56 KG/M2 | HEART RATE: 80 BPM | WEIGHT: 220 LBS | HEIGHT: 64 IN | DIASTOLIC BLOOD PRESSURE: 91 MMHG

## 2018-09-27 DIAGNOSIS — R07.89 OTHER CHEST PAIN: ICD-10-CM

## 2018-09-27 DIAGNOSIS — R06.02 SOB (SHORTNESS OF BREATH): ICD-10-CM

## 2018-09-27 PROCEDURE — 93017 CV STRESS TEST TRACING ONLY: CPT

## 2018-09-27 PROCEDURE — 93018 CV STRESS TEST I&R ONLY: CPT | Performed by: INTERNAL MEDICINE

## 2018-09-27 RX ORDER — DEXLANSOPRAZOLE 60 MG/1
60 CAPSULE, DELAYED RELEASE ORAL DAILY
COMMUNITY

## 2018-09-27 RX ORDER — ALBUTEROL SULFATE 90 UG/1
2 AEROSOL, METERED RESPIRATORY (INHALATION) EVERY 4 HOURS PRN
COMMUNITY

## 2018-09-28 LAB
BH CV STRESS BP STAGE 1: NORMAL
BH CV STRESS BP STAGE 2: NORMAL
BH CV STRESS DURATION MIN STAGE 1: 3
BH CV STRESS DURATION MIN STAGE 2: 3
BH CV STRESS DURATION SEC STAGE 1: 0
BH CV STRESS DURATION SEC STAGE 2: 0
BH CV STRESS GRADE STAGE 1: 10
BH CV STRESS GRADE STAGE 2: 12
BH CV STRESS HR STAGE 1: 130
BH CV STRESS HR STAGE 2: 155
BH CV STRESS METS STAGE 1: 5
BH CV STRESS METS STAGE 2: 7.5
BH CV STRESS PROTOCOL 1: NORMAL
BH CV STRESS RECOVERY BP: NORMAL MMHG
BH CV STRESS RECOVERY HR: 100 BPM
BH CV STRESS SPEED STAGE 1: 1.7
BH CV STRESS SPEED STAGE 2: 2.5
BH CV STRESS STAGE 1: 1
BH CV STRESS STAGE 2: 2
MAXIMAL PREDICTED HEART RATE: 178 BPM
PERCENT MAX PREDICTED HR: 87.08 %
STRESS BASELINE BP: NORMAL MMHG
STRESS BASELINE HR: 82 BPM
STRESS PERCENT HR: 102 %
STRESS POST ESTIMATED WORKLOAD: 7.5 METS
STRESS POST EXERCISE DUR MIN: 6 MIN
STRESS POST EXERCISE DUR SEC: 0 SEC
STRESS POST PEAK BP: NORMAL MMHG
STRESS POST PEAK HR: 155 BPM
STRESS TARGET HR: 151 BPM

## 2018-10-16 ENCOUNTER — OFFICE VISIT (OUTPATIENT)
Dept: OBSTETRICS AND GYNECOLOGY | Facility: CLINIC | Age: 43
End: 2018-10-16

## 2018-10-16 VITALS
BODY MASS INDEX: 41.11 KG/M2 | HEIGHT: 63 IN | SYSTOLIC BLOOD PRESSURE: 140 MMHG | WEIGHT: 232 LBS | DIASTOLIC BLOOD PRESSURE: 90 MMHG

## 2018-10-16 DIAGNOSIS — Z01.419 ENCOUNTER FOR GYNECOLOGICAL EXAMINATION WITHOUT ABNORMAL FINDING: Primary | ICD-10-CM

## 2018-10-16 DIAGNOSIS — Z78.9 NON-SMOKER: ICD-10-CM

## 2018-10-16 DIAGNOSIS — E55.9 VITAMIN D DEFICIENCY: ICD-10-CM

## 2018-10-16 DIAGNOSIS — Z12.31 ENCOUNTER FOR SCREENING MAMMOGRAM FOR MALIGNANT NEOPLASM OF BREAST: ICD-10-CM

## 2018-10-16 LAB
25(OH)D3+25(OH)D2 SERPL-MCNC: 33.2 NG/ML (ref 30–100)
ALBUMIN SERPL-MCNC: 4.2 G/DL (ref 3.5–5)
ALBUMIN/GLOB SERPL: 1.3 G/DL (ref 1.1–2.5)
ALP SERPL-CCNC: 80 U/L (ref 24–120)
ALT SERPL-CCNC: 28 U/L (ref 0–54)
AST SERPL-CCNC: 22 U/L (ref 7–45)
BASOPHILS # BLD AUTO: 0.08 10*3/MM3 (ref 0–0.2)
BASOPHILS NFR BLD AUTO: 0.6 % (ref 0–2)
BILIRUB SERPL-MCNC: 0.5 MG/DL (ref 0.1–1)
BUN SERPL-MCNC: 15 MG/DL (ref 5–21)
BUN/CREAT SERPL: 20.3 (ref 7–25)
CALCIUM SERPL-MCNC: 9.2 MG/DL (ref 8.4–10.4)
CHLORIDE SERPL-SCNC: 101 MMOL/L (ref 98–110)
CHOLEST SERPL-MCNC: 198 MG/DL (ref 130–200)
CO2 SERPL-SCNC: 26 MMOL/L (ref 24–31)
CREAT SERPL-MCNC: 0.74 MG/DL (ref 0.5–1.4)
EOSINOPHIL # BLD AUTO: 0.2 10*3/MM3 (ref 0–0.7)
EOSINOPHIL NFR BLD AUTO: 1.4 % (ref 0–4)
ERYTHROCYTE [DISTWIDTH] IN BLOOD BY AUTOMATED COUNT: 13.7 % (ref 12–15)
GLOBULIN SER CALC-MCNC: 3.2 GM/DL
GLUCOSE SERPL-MCNC: 86 MG/DL (ref 70–100)
HBA1C MFR BLD: 5.6 %
HCT VFR BLD AUTO: 41.2 % (ref 37–47)
HDLC SERPL-MCNC: 44 MG/DL
HGB BLD-MCNC: 13 G/DL (ref 12–16)
IMM GRANULOCYTES # BLD: 0.13 10*3/MM3 (ref 0–0.03)
IMM GRANULOCYTES NFR BLD: 0.9 % (ref 0–5)
LDLC SERPL CALC-MCNC: 108 MG/DL (ref 0–99)
LDLC/HDLC SERPL: 2.46 {RATIO}
LYMPHOCYTES # BLD AUTO: 3.98 10*3/MM3 (ref 0.72–4.86)
LYMPHOCYTES NFR BLD AUTO: 27.6 % (ref 15–45)
MCH RBC QN AUTO: 28.7 PG (ref 28–32)
MCHC RBC AUTO-ENTMCNC: 31.6 G/DL (ref 33–36)
MCV RBC AUTO: 90.9 FL (ref 82–98)
MONOCYTES # BLD AUTO: 0.65 10*3/MM3 (ref 0.19–1.3)
MONOCYTES NFR BLD AUTO: 4.5 % (ref 4–12)
NEUTROPHILS # BLD AUTO: 9.38 10*3/MM3 (ref 1.87–8.4)
NEUTROPHILS NFR BLD AUTO: 65 % (ref 39–78)
NRBC BLD AUTO-RTO: 0 /100 WBC (ref 0–0)
PLATELET # BLD AUTO: 338 10*3/MM3 (ref 130–400)
POTASSIUM SERPL-SCNC: 4.4 MMOL/L (ref 3.5–5.3)
PROT SERPL-MCNC: 7.4 G/DL (ref 6.3–8.7)
RBC # BLD AUTO: 4.53 10*6/MM3 (ref 4.2–5.4)
SODIUM SERPL-SCNC: 138 MMOL/L (ref 135–145)
T4 FREE SERPL-MCNC: 1.23 NG/DL (ref 0.78–2.19)
TRIGL SERPL-MCNC: 229 MG/DL (ref 0–149)
TSH SERPL DL<=0.005 MIU/L-ACNC: 0.99 MIU/ML (ref 0.47–4.68)
VLDLC SERPL CALC-MCNC: 45.8 MG/DL
WBC # BLD AUTO: 14.42 10*3/MM3 (ref 4.8–10.8)

## 2018-10-16 PROCEDURE — 99213 OFFICE O/P EST LOW 20 MIN: CPT | Performed by: NURSE PRACTITIONER

## 2018-10-16 PROCEDURE — 99396 PREV VISIT EST AGE 40-64: CPT | Performed by: NURSE PRACTITIONER

## 2018-10-16 NOTE — PROGRESS NOTES
"Subjective   Breonna Bellamy is a 43 y.o. female.     History of Present Illness    The following portions of the patient's history were reviewed and updated as appropriate: allergies, current medications, past family history, past medical history, past social history, past surgical history and problem list.    /90 (BP Location: Left arm, Patient Position: Sitting, Cuff Size: Large Adult)   Ht 160 cm (63\")   Wt 105 kg (232 lb)   LMP 09/13/2016 Comment: negative hcg noted to chart  BMI 41.10 kg/m²     Review of Systems   Respiratory: Negative for apnea.    Gastrointestinal: Positive for diarrhea.        Pt reports PCP follows IBS   Genitourinary: Positive for frequency and urgency. Negative for dysuria, flank pain, hematuria, vaginal bleeding and vaginal discharge.        Frequency and stress incontinence.    Musculoskeletal: Positive for back pain.        Pt reports PCP is addressing back pain       Objective   Physical Exam   Constitutional: She is oriented to person, place, and time. She appears well-developed and well-nourished.   HENT:   Head: Normocephalic and atraumatic.   Right Ear: External ear normal.   Left Ear: External ear normal.   Eyes: Conjunctivae and EOM are normal. Right eye exhibits no discharge. Left eye exhibits no discharge. No scleral icterus.   Neck: Normal range of motion. Neck supple. Carotid bruit is not present. No thyromegaly present.   Cardiovascular: Regular rhythm and normal heart sounds.    No murmur heard.  Pulmonary/Chest: Effort normal and breath sounds normal. No respiratory distress. Right breast exhibits no inverted nipple, no mass, no nipple discharge, no skin change and no tenderness. Left breast exhibits no inverted nipple, no mass, no nipple discharge, no skin change and no tenderness. Breasts are symmetrical. There is no breast swelling.   Abdominal: Soft. Bowel sounds are normal. She exhibits no distension and no mass. There is no tenderness. There is no guarding. " No hernia. Hernia confirmed negative in the right inguinal area and confirmed negative in the left inguinal area.   Genitourinary: Vagina normal. Rectal exam shows no mass. No breast tenderness, discharge or bleeding. There is no rash, tenderness, lesion or injury on the right labia. There is no rash, tenderness, lesion or injury on the left labia. No erythema or bleeding in the vagina. No signs of injury around the vagina. No vaginal discharge found.   Genitourinary Comments: Cervix, Uterus and Adnexa are surgically absent.  Urethra and urethral meatus normal  Bladder, normal no prolapse  Perineum and anus examined and without lesions   Musculoskeletal: Normal range of motion. She exhibits no edema or tenderness.   Lymphadenopathy:        Head (right side): No submental, no submandibular, no tonsillar, no preauricular, no posterior auricular and no occipital adenopathy present.        Head (left side): No submental, no submandibular, no tonsillar, no preauricular, no posterior auricular and no occipital adenopathy present.     She has no cervical adenopathy.        Right cervical: No superficial cervical, no deep cervical and no posterior cervical adenopathy present.       Left cervical: No superficial cervical, no deep cervical and no posterior cervical adenopathy present.     She has no axillary adenopathy.        Right: No inguinal adenopathy present.        Left: No inguinal adenopathy present.   Neurological: She is alert and oriented to person, place, and time. She exhibits normal muscle tone. Coordination normal.   Skin: Skin is warm and dry. No bruising and no rash noted. No erythema.   Psychiatric: She has a normal mood and affect. Her behavior is normal. Judgment and thought content normal.   Nursing note and vitals reviewed.      Assessment/Plan   Well woman exam.  Annual labs ordered, pt requested labs today since she is fasting but will have PCP follow up on results. Pt declined UA today.   Mammogram  ordered.     Discussed diet, bowel and urinary habits. Pt reports she has tried several mediations for bladder symptoms but the side effects were not tolerable. Pt to decrease carbs, stop caffeine intake and increase water intake then evaluate if symptoms decrease.   Pt may consider a consult with Dr. Villanueva.     Patient's Body mass index is 41.1 kg/m². BMI is above normal parameters. Recommendations include: educational material.      Breonna was seen today for gynecologic exam.    Diagnoses and all orders for this visit:    Encounter for gynecological examination without abnormal finding  -     CBC & Differential  -     Comprehensive Metabolic Panel  -     Hemoglobin A1c  -     Lipid Panel With LDL / HDL Ratio  -     T4, Free  -     TSH    Vitamin D deficiency  -     Vitamin D 25 Hydroxy    Non-smoker    BMI 40.0-44.9, adult (CMS/[facility])    Encounter for screening mammogram for malignant neoplasm of breast  -     Mammo Screening Digital Tomosynthesis Bilateral With CAD; Future

## 2018-10-17 NOTE — PATIENT INSTRUCTIONS

## 2018-10-18 RX ORDER — ERGOCALCIFEROL 1.25 MG/1
50000 CAPSULE ORAL WEEKLY
Qty: 8 CAPSULE | Refills: 0 | Status: SHIPPED | OUTPATIENT
Start: 2018-10-18 | End: 2018-12-07

## 2018-11-14 ENCOUNTER — TRANSCRIBE ORDERS (OUTPATIENT)
Dept: LAB | Facility: HOSPITAL | Age: 43
End: 2018-11-14

## 2018-11-14 ENCOUNTER — HOSPITAL ENCOUNTER (OUTPATIENT)
Dept: GENERAL RADIOLOGY | Facility: HOSPITAL | Age: 43
Discharge: HOME | End: 2018-11-14
Attending: INTERNAL MEDICINE | Admitting: INTERNAL MEDICINE

## 2018-11-14 ENCOUNTER — APPOINTMENT (OUTPATIENT)
Dept: LAB | Facility: HOSPITAL | Age: 43
End: 2018-11-14
Attending: INTERNAL MEDICINE

## 2018-11-14 DIAGNOSIS — D72.829 LEUKOCYTOSIS, UNSPECIFIED TYPE: Primary | ICD-10-CM

## 2018-11-14 LAB
BILIRUB UR QL STRIP: NEGATIVE
CLARITY UR: CLEAR
COLOR UR: YELLOW
GLUCOSE UR STRIP-MCNC: NEGATIVE MG/DL
HGB UR QL STRIP.AUTO: NEGATIVE
KETONES UR QL STRIP: NEGATIVE
LEUKOCYTE ESTERASE UR QL STRIP.AUTO: NEGATIVE
NITRITE UR QL STRIP: NEGATIVE
PH UR STRIP.AUTO: 5.5 [PH] (ref 5–8)
PROT UR QL STRIP: NEGATIVE
SP GR UR STRIP: 1.02 (ref 1–1.03)
UROBILINOGEN UR QL STRIP: NORMAL

## 2018-11-14 PROCEDURE — 81003 URINALYSIS AUTO W/O SCOPE: CPT | Performed by: INTERNAL MEDICINE

## 2018-11-14 PROCEDURE — 71046 X-RAY EXAM CHEST 2 VIEWS: CPT

## 2018-11-19 ENCOUNTER — APPOINTMENT (OUTPATIENT)
Dept: MAMMOGRAPHY | Facility: HOSPITAL | Age: 43
End: 2018-11-19

## 2018-12-10 ENCOUNTER — HOSPITAL ENCOUNTER (OUTPATIENT)
Dept: MAMMOGRAPHY | Facility: HOSPITAL | Age: 43
Discharge: HOME | End: 2018-12-10
Admitting: NURSE PRACTITIONER

## 2018-12-10 DIAGNOSIS — Z12.31 ENCOUNTER FOR SCREENING MAMMOGRAM FOR MALIGNANT NEOPLASM OF BREAST: ICD-10-CM

## 2018-12-10 PROCEDURE — 77063 BREAST TOMOSYNTHESIS BI: CPT

## 2018-12-10 PROCEDURE — 77067 SCR MAMMO BI INCL CAD: CPT

## 2019-01-11 RX ORDER — ERGOCALCIFEROL 1.25 MG/1
CAPSULE ORAL
Qty: 8 CAPSULE | Refills: 0 | OUTPATIENT
Start: 2019-01-11

## 2019-01-28 ENCOUNTER — LAB REQUISITION (OUTPATIENT)
Dept: LAB | Facility: HOSPITAL | Age: 44
End: 2019-01-28

## 2019-01-28 DIAGNOSIS — Z00.00 ENCOUNTER FOR GENERAL ADULT MEDICAL EXAMINATION WITHOUT ABNORMAL FINDINGS: ICD-10-CM

## 2019-01-28 LAB
FERRITIN SERPL-MCNC: 23.6 NG/ML (ref 6.24–137)
IRON 24H UR-MRATE: 56 MCG/DL (ref 42–180)
IRON SATN MFR SERPL: 14 % (ref 20–45)
TIBC SERPL-MCNC: 412 MCG/DL (ref 225–420)

## 2019-01-28 PROCEDURE — 83550 IRON BINDING TEST: CPT | Performed by: INTERNAL MEDICINE

## 2019-01-28 PROCEDURE — 82728 ASSAY OF FERRITIN: CPT | Performed by: INTERNAL MEDICINE

## 2019-01-28 PROCEDURE — 83540 ASSAY OF IRON: CPT | Performed by: INTERNAL MEDICINE

## 2019-02-04 ENCOUNTER — TRANSCRIBE ORDERS (OUTPATIENT)
Dept: ADMINISTRATIVE | Facility: HOSPITAL | Age: 44
End: 2019-02-04

## 2019-02-04 ENCOUNTER — APPOINTMENT (OUTPATIENT)
Dept: LAB | Facility: HOSPITAL | Age: 44
End: 2019-02-04
Attending: INTERNAL MEDICINE

## 2019-02-04 ENCOUNTER — TRANSCRIBE ORDERS (OUTPATIENT)
Dept: LAB | Facility: HOSPITAL | Age: 44
End: 2019-02-04

## 2019-02-04 DIAGNOSIS — D72.829 LEUKOCYTOSIS, UNSPECIFIED TYPE: Primary | ICD-10-CM

## 2019-02-04 DIAGNOSIS — E61.1 IRON DEFICIENCY: ICD-10-CM

## 2019-02-04 PROCEDURE — 36415 COLL VENOUS BLD VENIPUNCTURE: CPT

## 2019-02-04 PROCEDURE — 81270 JAK2 GENE: CPT | Performed by: INTERNAL MEDICINE

## 2019-02-07 LAB
JAK2 P.V617F BLD/T QL: NORMAL
LAB DIRECTOR NAME PROVIDER: NORMAL
LABORATORY COMMENT REPORT: NORMAL

## 2019-02-11 ENCOUNTER — HOSPITAL ENCOUNTER (OUTPATIENT)
Dept: CT IMAGING | Facility: HOSPITAL | Age: 44
Discharge: HOME | End: 2019-02-11
Attending: INTERNAL MEDICINE | Admitting: INTERNAL MEDICINE

## 2019-02-11 VITALS
WEIGHT: 239.4 LBS | BODY MASS INDEX: 40.87 KG/M2 | RESPIRATION RATE: 19 BRPM | OXYGEN SATURATION: 94 % | SYSTOLIC BLOOD PRESSURE: 104 MMHG | HEIGHT: 64 IN | TEMPERATURE: 98.4 F | DIASTOLIC BLOOD PRESSURE: 66 MMHG | HEART RATE: 77 BPM

## 2019-02-11 DIAGNOSIS — D72.829 LEUKOCYTOSIS, UNSPECIFIED TYPE: ICD-10-CM

## 2019-02-11 LAB
APTT PPP: 28.2 SECONDS (ref 24.1–34.8)
BASOPHILS # BLD AUTO: 0.09 10*3/MM3 (ref 0–0.2)
BASOPHILS NFR BLD AUTO: 0.8 % (ref 0–2)
DEPRECATED RDW RBC AUTO: 41.8 FL (ref 40–54)
EOSINOPHIL # BLD AUTO: 0.2 10*3/MM3 (ref 0–0.7)
EOSINOPHIL NFR BLD AUTO: 1.8 % (ref 0–4)
ERYTHROCYTE [DISTWIDTH] IN BLOOD BY AUTOMATED COUNT: 13.5 % (ref 12–15)
HCT VFR BLD AUTO: 42.9 % (ref 37–47)
HGB BLD-MCNC: 14.2 G/DL (ref 12–16)
IMM GRANULOCYTES # BLD AUTO: 0.09 10*3/MM3 (ref 0–0.05)
IMM GRANULOCYTES NFR BLD AUTO: 0.8 % (ref 0–5)
INR PPP: 0.88 (ref 0.91–1.09)
LYMPHOCYTES # BLD AUTO: 3.8 10*3/MM3 (ref 0.72–4.86)
LYMPHOCYTES NFR BLD AUTO: 33.5 % (ref 15–45)
MCH RBC QN AUTO: 28.5 PG (ref 28–32)
MCHC RBC AUTO-ENTMCNC: 33.1 G/DL (ref 33–36)
MCV RBC AUTO: 86.1 FL (ref 82–98)
MONOCYTES # BLD AUTO: 0.67 10*3/MM3 (ref 0.19–1.3)
MONOCYTES NFR BLD AUTO: 5.9 % (ref 4–12)
NEUTROPHILS # BLD AUTO: 6.5 10*3/MM3 (ref 1.87–8.4)
NEUTROPHILS NFR BLD AUTO: 57.2 % (ref 39–78)
NRBC BLD AUTO-RTO: 0 /100 WBC (ref 0–0)
PLATELET # BLD AUTO: 323 10*3/MM3 (ref 130–400)
PLATELET # BLD AUTO: 344 10*3/MM3 (ref 130–400)
PMV BLD AUTO: 9.8 FL (ref 6–12)
PROTHROMBIN TIME: 12.2 SECONDS (ref 11.9–14.6)
RBC # BLD AUTO: 4.98 10*6/MM3 (ref 4.2–5.4)
WBC NRBC COR # BLD: 11.35 10*3/MM3 (ref 4.8–10.8)

## 2019-02-11 PROCEDURE — 85049 AUTOMATED PLATELET COUNT: CPT | Performed by: INTERNAL MEDICINE

## 2019-02-11 PROCEDURE — 85025 COMPLETE CBC W/AUTO DIFF WBC: CPT | Performed by: INTERNAL MEDICINE

## 2019-02-11 PROCEDURE — 25010000002 MIDAZOLAM PER 1 MG: Performed by: RADIOLOGY

## 2019-02-11 PROCEDURE — 88311 DECALCIFY TISSUE: CPT | Performed by: INTERNAL MEDICINE

## 2019-02-11 PROCEDURE — 88313 SPECIAL STAINS GROUP 2: CPT | Performed by: INTERNAL MEDICINE

## 2019-02-11 PROCEDURE — 25010000002 FENTANYL CITRATE (PF) 100 MCG/2ML SOLUTION: Performed by: RADIOLOGY

## 2019-02-11 PROCEDURE — 85610 PROTHROMBIN TIME: CPT | Performed by: INTERNAL MEDICINE

## 2019-02-11 PROCEDURE — 88305 TISSUE EXAM BY PATHOLOGIST: CPT | Performed by: INTERNAL MEDICINE

## 2019-02-11 PROCEDURE — 85730 THROMBOPLASTIN TIME PARTIAL: CPT | Performed by: INTERNAL MEDICINE

## 2019-02-11 PROCEDURE — 77012 CT SCAN FOR NEEDLE BIOPSY: CPT

## 2019-02-11 RX ORDER — ASPIRIN 81 MG/1
81 TABLET ORAL DAILY
COMMUNITY
End: 2020-06-02

## 2019-02-11 RX ORDER — MIDAZOLAM HYDROCHLORIDE 1 MG/ML
INJECTION INTRAMUSCULAR; INTRAVENOUS
Status: COMPLETED | OUTPATIENT
Start: 2019-02-11 | End: 2019-02-11

## 2019-02-11 RX ORDER — FENTANYL CITRATE 50 UG/ML
INJECTION, SOLUTION INTRAMUSCULAR; INTRAVENOUS
Status: COMPLETED | OUTPATIENT
Start: 2019-02-11 | End: 2019-02-11

## 2019-02-11 RX ORDER — LIDOCAINE HYDROCHLORIDE 10 MG/ML
INJECTION, SOLUTION INFILTRATION; PERINEURAL
Status: COMPLETED | OUTPATIENT
Start: 2019-02-11 | End: 2019-02-11

## 2019-02-11 RX ADMIN — FENTANYL CITRATE 25 MCG: 50 INJECTION, SOLUTION INTRAMUSCULAR; INTRAVENOUS at 11:51

## 2019-02-11 RX ADMIN — LIDOCAINE HYDROCHLORIDE 10 ML: 10 INJECTION, SOLUTION INFILTRATION; PERINEURAL at 11:53

## 2019-02-11 RX ADMIN — MIDAZOLAM 0.25 MG: 1 INJECTION INTRAMUSCULAR; INTRAVENOUS at 11:51

## 2019-02-11 NOTE — PRE-PROCEDURE NOTE
Procedure: CT guided bone marrow aspiration    Reason for study: Abnormal blood counts    Patient condition: Stable    Risks, alternatives and benefits explained to the patient. All questions were answered prior to the exam. Plan is for moderate sedation.        Full report to follow.

## 2019-02-22 RX ORDER — ERGOCALCIFEROL 1.25 MG/1
CAPSULE ORAL
Qty: 8 CAPSULE | Refills: 0 | OUTPATIENT
Start: 2019-02-22

## 2019-02-26 LAB
CYTO UR: NORMAL
LAB AP CASE REPORT: NORMAL
PATH REPORT.FINAL DX SPEC: NORMAL
PATH REPORT.GROSS SPEC: NORMAL

## 2019-03-01 ENCOUNTER — TRANSCRIBE ORDERS (OUTPATIENT)
Dept: UROLOGY | Facility: CLINIC | Age: 44
End: 2019-03-01

## 2019-03-01 ENCOUNTER — HOSPITAL ENCOUNTER (OUTPATIENT)
Dept: GENERAL RADIOLOGY | Facility: HOSPITAL | Age: 44
Discharge: HOME | End: 2019-03-01
Admitting: UROLOGY

## 2019-03-01 DIAGNOSIS — N20.0 RECURRENT KIDNEY STONES: ICD-10-CM

## 2019-03-01 DIAGNOSIS — N20.0 RECURRENT KIDNEY STONES: Primary | ICD-10-CM

## 2019-03-01 PROCEDURE — 74018 RADEX ABDOMEN 1 VIEW: CPT

## 2019-03-04 ENCOUNTER — OFFICE VISIT (OUTPATIENT)
Dept: UROLOGY | Facility: CLINIC | Age: 44
End: 2019-03-04

## 2019-03-04 VITALS — TEMPERATURE: 98.1 F | WEIGHT: 239 LBS | BODY MASS INDEX: 40.8 KG/M2 | HEIGHT: 64 IN

## 2019-03-04 DIAGNOSIS — N20.0 RECURRENT KIDNEY STONES: Primary | ICD-10-CM

## 2019-03-04 LAB
BILIRUB BLD-MCNC: NEGATIVE MG/DL
CLARITY, POC: CLEAR
COLOR UR: YELLOW
GLUCOSE UR STRIP-MCNC: NEGATIVE MG/DL
KETONES UR QL: NEGATIVE
LEUKOCYTE EST, POC: NEGATIVE
NITRITE UR-MCNC: NEGATIVE MG/ML
PH UR: 6 [PH] (ref 5–8)
PROT UR STRIP-MCNC: NEGATIVE MG/DL
RBC # UR STRIP: NEGATIVE /UL
SP GR UR: 1.02 (ref 1–1.03)
UROBILINOGEN UR QL: NORMAL

## 2019-03-04 PROCEDURE — 81003 URINALYSIS AUTO W/O SCOPE: CPT | Performed by: UROLOGY

## 2019-03-04 PROCEDURE — 99213 OFFICE O/P EST LOW 20 MIN: CPT | Performed by: UROLOGY

## 2019-03-04 RX ORDER — METHYLPREDNISOLONE 4 MG/1
TABLET ORAL
COMMUNITY
Start: 2019-03-01 | End: 2019-03-21

## 2019-03-04 RX ORDER — PHENAZOPYRIDINE HYDROCHLORIDE 200 MG/1
200 TABLET, FILM COATED ORAL 3 TIMES DAILY PRN
Qty: 21 TABLET | Refills: 3 | Status: SHIPPED | OUTPATIENT
Start: 2019-03-04 | End: 2019-04-01

## 2019-03-04 RX ORDER — TRAMADOL HYDROCHLORIDE 50 MG/1
TABLET ORAL
COMMUNITY
Start: 2019-02-22 | End: 2021-03-01 | Stop reason: ALTCHOICE

## 2019-03-04 NOTE — PATIENT INSTRUCTIONS

## 2019-03-04 NOTE — PROGRESS NOTES
Ms. Bellamy is 43 y.o. female    CHIEF COMPLAINT: I am here for kidney stones.    HPI  Urolithiasis  Today's visit is related to follow up for previous history of stones  Location: Renal calculi. Both kidneys have had stones in past.   Quality: Past stones have been mostly  CaOxalate or UricAcid. .   Severity: Current stone burden is small left renal calculus.   Duration: Began having stones approximately Approximately 2000. Most recent episode was 3 weeks ago.   Timing: unpredictable.   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   Associated signs or symptoms: At present the patient has no active hematuria or flank pain.  History related to this issue:   - 01/2017: 24 hr urine done showed on ly 1L urine output over 24 hours. All other normal.       The following portions of the patient's history were reviewed and updated as appropriate: allergies, current medications, past family history, past medical history, past social history, past surgical history and problem list.      Review of Systems   Constitutional: Negative for chills and fever.   Gastrointestinal: Negative for abdominal pain, anal bleeding and blood in stool.   Genitourinary: Negative for dysuria and hematuria.           Current Outpatient Medications:   •  albuterol (PROVENTIL HFA;VENTOLIN HFA) 108 (90 Base) MCG/ACT inhaler, Inhale 2 puffs Every 4 (Four) Hours As Needed for Wheezing., Disp: , Rfl:   •  aspirin 81 MG EC tablet, Take 81 mg by mouth Daily., Disp: , Rfl:   •  busPIRone (BUSPAR) 10 MG tablet, Take 10 mg by mouth As Needed., Disp: , Rfl:   •  cholecalciferol (VITAMIN D3) 1000 units tablet, Take 1,000 Units by mouth Daily., Disp: , Rfl:   •  colesevelam (WELCHOL) 625 MG tablet, Take 1,875 mg by mouth as needed., Disp: , Rfl:   •  dexlansoprazole (DEXILANT) 60 MG capsule, Take 60 mg by mouth Daily., Disp: , Rfl:   •  dicyclomine (BENTYL) 20 MG tablet, Take 40 mg by mouth every night., Disp: , Rfl:   •   methylPREDNISolone (MEDROL, MARÍA ELENA,) 4 MG tablet, , Disp: , Rfl:   •  propranolol LA (INDERAL LA) 80 MG 24 hr capsule, , Disp: , Rfl:   •  sucralfate (CARAFATE) 1 G tablet, Take 1 g by mouth as needed., Disp: , Rfl:   •  traMADol (ULTRAM) 50 MG tablet, , Disp: , Rfl:     Past Medical History:   Diagnosis Date   • Abnormal uterine bleeding    • Asthma    • Dysmenorrhea    • Hypertension    • IBS (irritable bowel syndrome)    • IBS (irritable bowel syndrome)    • IBS (irritable bowel syndrome)    • Infertility, female    • Irregular menstruation    • Kidney stones    • Kidney stones    • LGSIL (low grade squamous intraepithelial dysplasia)    • Polycystic ovarian disease    • PONV (postoperative nausea and vomiting)    • Spontaneous      x2   • Stroke (CMS/HCC)     mini stroke   • Tachycardia        Past Surgical History:   Procedure Laterality Date   • CHOLECYSTECTOMY     • COLONOSCOPY     • CYSTOSCOPY N/A 2016    Procedure: CYSTOSCOPY;  Surgeon: Jonathan Figueroa MD;  Location: [facility] OR;  Service:    • CYSTOSCOPY ELECTROHYDRAULIC LITHOTRIPSY     • EYE SURGERY      LASIK   • HYSTEROSCOPY ENDOMETRIAL ABLATION      X2   • KIDNEY STONE SURGERY      removed   • SKIN BIOPSY     • TOTAL LAPAROSCOPIC HYSTERECTOMY N/A 2016    Procedure: TOTAL LAPAROSCOPIC HYSTERECTOMY WITH DAVINCI SI ROBOT WITH CYSTO;  Surgeon: Jonathan Figueroa MD;  Location: [facility] OR;  Service:        Social History     Socioeconomic History   • Marital status:      Spouse name: Not on file   • Number of children: Not on file   • Years of education: Not on file   • Highest education level: Not on file   Tobacco Use   • Smoking status: Never Smoker   • Smokeless tobacco: Never Used   Substance and Sexual Activity   • Alcohol use: No     Comment: occasional   • Drug use: No   • Sexual activity: Yes     Partners: Male     Birth control/protection: None       Family History   Problem Relation Age of Onset   • Anesthesia problems Mother   "  • Hypertension Mother    • Anesthesia problems Maternal Grandmother    • Hypertension Father    • Arrhythmia Father    • Colon cancer Paternal Grandmother    • Pancreatic cancer Paternal Grandmother    • Liver cancer Paternal Grandfather    • Heart attack Paternal Grandfather    • Ovarian cancer Neg Hx    • Breast cancer Neg Hx    • Uterine cancer Neg Hx          Temp 98.1 °F (36.7 °C)   Ht 161.5 cm (63.6\")   Wt 108 kg (239 lb)   LMP 09/13/2016 Comment: negative hcg noted to chart  BMI 41.54 kg/m²       Physical Exam  Constitutional:  They  appear well-developed and well-nourished. There are no obvious deformities. No distress.   Pulmonary/Chest: Effort normal.   GI: Soft. The patient exhibits no distension and no mass. There is no tenderness. There is no rebound and no guarding. No hernia.   Neurological: Patient is alert and oriented to person, place, and time.   Skin: Skin is warm and dry. Not diaphoretic.   Psychiatric:  normal mood and affect. Not agitated.         Data  Results for orders placed or performed in visit on 03/04/19   POC Urinalysis Dipstick, Multipro   Result Value Ref Range    Color Yellow Yellow, Straw, Dark Yellow, Brenda    Clarity, UA Clear Clear    Glucose, UA Negative Negative, 1000 mg/dL (3+) mg/dL    Bilirubin Negative Negative    Ketones, UA Negative Negative    Specific Gravity  1.025 1.005 - 1.030    Blood, UA Negative Negative    pH, Urine 6.0 5.0 - 8.0    Protein, POC Negative Negative mg/dL    Urobilinogen, UA Normal Normal    Nitrite, UA Negative Negative    Leukocytes Negative Negative       EXAMINATION: XR ABDOMEN KUB- 3/1/2019 5:00 PM CST     HISTORY: recurrent stones; N20.0-Calculus of kidney.     REPORT: A supine view of the abdomen was obtained, comparison is made  with the KUB on 2/12/2018.     No right-sided nephrolithiasis is identified. There is a tiny  calcification projecting over the upper pole the left kidney that  measures approximately 2 mm. There is no " definite ureterolithiasis.  Cholecystectomy clips are present. There is a posterior bowel gas  overall, nonspecific. Moderate stool is noted in the right colon. The  osseous structures are unremarkable.     IMPRESSION:  Probable 2 mm nephrolithiasis at the upper pole of the left  kidney. No definite right nephrolithiasis or ureterolithiasis.  This report was finalized on 03/01/2019 17:02 by Dr. Roberto Caceres MD.    These images were made available to me to review independently.  I did review the radiologist's report described above with regard to the urologic findings. She has a left upper stone. Had two in right kidney last time.   /Rony Bañuelos MD        Patient's Body mass index is 41.54 kg/m². BMI is above normal parameters. Recommendations include: educational material.      Assessment and Plan  Diagnoses and all orders for this visit:    Recurrent kidney stones  -     POC Urinalysis Dipstick, Multipro  -     Stone Analysis - Calculus, Voided; Future      The patient has been diagnosed with renal urolithiasis. Location is described as  left. The patient's options for therapy are discussed based on the size, location, stone burden, and symptoms.  The decision has been made to follow these stones radiographically without treatment.  The patient understands the risks associated with the conservative approach, but this is a reasonable treatment plan.  The need to contract me for hematuria, fever, recurrent UTI's, flank pain or change in ideology is explained.  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        (Please note that portions of this note were completed with a voice recognition program.)  Rony Bañuelos MD  03/04/19  10:34 AM      Cc: Dr. Hernandez

## 2019-03-19 ENCOUNTER — LAB REQUISITION (OUTPATIENT)
Dept: LAB | Facility: HOSPITAL | Age: 44
End: 2019-03-19

## 2019-03-19 DIAGNOSIS — Z00.00 ENCOUNTER FOR GENERAL ADULT MEDICAL EXAMINATION WITHOUT ABNORMAL FINDINGS: ICD-10-CM

## 2019-03-19 LAB
BASOPHILS # BLD AUTO: 0.17 10*3/MM3 (ref 0–0.2)
BASOPHILS NFR BLD AUTO: 1.1 % (ref 0–2)
DEPRECATED RDW RBC AUTO: 43.7 FL (ref 40–54)
EOSINOPHIL # BLD AUTO: 0.08 10*3/MM3 (ref 0–0.7)
EOSINOPHIL NFR BLD AUTO: 0.5 % (ref 0–4)
ERYTHROCYTE [DISTWIDTH] IN BLOOD BY AUTOMATED COUNT: 13.2 % (ref 12–15)
FERRITIN SERPL-MCNC: 25.1 NG/ML (ref 6.24–137)
HCT VFR BLD AUTO: 42.4 % (ref 37–47)
HGB BLD-MCNC: 13.3 G/DL (ref 12–16)
IMM GRANULOCYTES # BLD AUTO: 0.23 10*3/MM3 (ref 0–0.05)
IMM GRANULOCYTES NFR BLD AUTO: 1.4 % (ref 0–5)
IRON 24H UR-MRATE: 83 MCG/DL (ref 42–180)
IRON SATN MFR SERPL: 19 % (ref 20–45)
LYMPHOCYTES # BLD AUTO: 2.92 10*3/MM3 (ref 0.72–4.86)
LYMPHOCYTES NFR BLD AUTO: 18.4 % (ref 15–45)
MCH RBC QN AUTO: 28.3 PG (ref 28–32)
MCHC RBC AUTO-ENTMCNC: 31.4 G/DL (ref 33–36)
MCV RBC AUTO: 90.2 FL (ref 82–98)
MONOCYTES # BLD AUTO: 0.6 10*3/MM3 (ref 0.19–1.3)
MONOCYTES NFR BLD AUTO: 3.8 % (ref 4–12)
NEUTROPHILS # BLD AUTO: 11.9 10*3/MM3 (ref 1.87–8.4)
NEUTROPHILS NFR BLD AUTO: 74.8 % (ref 39–78)
NRBC BLD AUTO-RTO: 0 /100 WBC (ref 0–0)
PLATELET # BLD AUTO: 360 10*3/MM3 (ref 130–400)
PMV BLD AUTO: 10.4 FL (ref 6–12)
RBC # BLD AUTO: 4.7 10*6/MM3 (ref 4.2–5.4)
TIBC SERPL-MCNC: 434 MCG/DL (ref 225–420)
WBC NRBC COR # BLD: 15.9 10*3/MM3 (ref 4.8–10.8)

## 2019-03-19 PROCEDURE — 85025 COMPLETE CBC W/AUTO DIFF WBC: CPT | Performed by: INTERNAL MEDICINE

## 2019-03-19 PROCEDURE — 83540 ASSAY OF IRON: CPT | Performed by: INTERNAL MEDICINE

## 2019-03-19 PROCEDURE — 82728 ASSAY OF FERRITIN: CPT | Performed by: INTERNAL MEDICINE

## 2019-03-19 PROCEDURE — 83550 IRON BINDING TEST: CPT | Performed by: INTERNAL MEDICINE

## 2019-03-20 ENCOUNTER — TELEPHONE (OUTPATIENT)
Dept: VASCULAR SURGERY | Facility: CLINIC | Age: 44
End: 2019-03-20

## 2019-03-20 NOTE — TELEPHONE ENCOUNTER
Spoke with Mrs Bellamy reminding her of her appointment for Thursday, March 21st, 2019 at 845 am with Dr Haque. Mrs Bellamy confirmed she would be here.

## 2019-03-21 ENCOUNTER — OFFICE VISIT (OUTPATIENT)
Dept: VASCULAR SURGERY | Facility: CLINIC | Age: 44
End: 2019-03-21

## 2019-03-21 VITALS
SYSTOLIC BLOOD PRESSURE: 138 MMHG | WEIGHT: 245 LBS | OXYGEN SATURATION: 98 % | BODY MASS INDEX: 43.41 KG/M2 | HEIGHT: 63 IN | DIASTOLIC BLOOD PRESSURE: 94 MMHG | HEART RATE: 81 BPM

## 2019-03-21 DIAGNOSIS — I86.8 VENOUS ANEURYSM: Primary | ICD-10-CM

## 2019-03-21 DIAGNOSIS — M79.602 LEFT ARM PAIN: ICD-10-CM

## 2019-03-21 PROCEDURE — 99204 OFFICE O/P NEW MOD 45 MIN: CPT | Performed by: SURGERY

## 2019-03-21 NOTE — PROGRESS NOTES
2019      Gavin Yu MD  [address] [place] [address]    Breonna Bellamy  1975    Chief Complaint   Patient presents with   • Establish Care     Venous malformation of the L elbow.  The patient states that this started after she had blood drawn in December and has been in severe pain since that time. Referred by Lauri Peguero PA-C.        Dear Gavin Yu MD    HPI  I had the pleasure of seeing your patient Breonna Bellamy in the office today.  Thank you kindly for this consultation.  As you recall, Breonna Bellamy is a 43 y.o.  female who you are currently following for left upper extremity pain.  She is following with Dr. Wharton for workup of lymphoma.  She reports having blood drawn from cephalic vein with tourniquet applied with pain since December.  This is significantly affecting her daily life.  She reports history of mini strokes.  She has no strokelike symptoms at this time.  She is maintained on aspirin and Welchol. 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        Past Medical History:   Diagnosis Date   • Abnormal uterine bleeding    • Asthma    • Dysmenorrhea    • Hypertension    • IBS (irritable bowel syndrome)    • IBS (irritable bowel syndrome)    • IBS (irritable bowel syndrome)    • Infertility, female    • Irregular menstruation    • Kidney stones    • Kidney stones    • LGSIL (low grade squamous intraepithelial dysplasia)    • Polycystic ovarian disease    • PONV (postoperative nausea and vomiting)    • Spontaneous      x2   • Stroke (CMS/HCC)     mini stroke   • Tachycardia        Past Surgical History:   Procedure Laterality Date   • CHOLECYSTECTOMY     • COLONOSCOPY     • CYSTOSCOPY N/A 2016    Procedure: CYSTOSCOPY;  Surgeon: Jonathan Figueroa MD;  Location: [facility];  Service:    • CYSTOSCOPY ELECTROHYDRAULIC LITHOTRIPSY     • EYE SURGERY      LASIK   • HYSTEROSCOPY ENDOMETRIAL ABLATION      X2   • KIDNEY STONE  SURGERY      removed   • SKIN BIOPSY     • TOTAL LAPAROSCOPIC HYSTERECTOMY N/A 9/19/2016    Procedure: TOTAL LAPAROSCOPIC HYSTERECTOMY WITH DAVINCI SI ROBOT WITH CYSTO;  Surgeon: Jonathan Figueroa MD;  Location: [facility];  Service:        Family History   Problem Relation Age of Onset   • Anesthesia problems Mother    • Hypertension Mother    • Anesthesia problems Maternal Grandmother    • Hypertension Father    • Arrhythmia Father    • Colon cancer Paternal Grandmother    • Pancreatic cancer Paternal Grandmother    • Liver cancer Paternal Grandfather    • Heart attack Paternal Grandfather    • Ovarian cancer Neg Hx    • Breast cancer Neg Hx    • Uterine cancer Neg Hx        Social History     Socioeconomic History   • Marital status:      Spouse name: Not on file   • Number of children: Not on file   • Years of education: Not on file   • Highest education level: Not on file   Tobacco Use   • Smoking status: Never Smoker   • Smokeless tobacco: Never Used   Substance and Sexual Activity   • Alcohol use: No     Comment: occasional   • Drug use: No   • Sexual activity: Yes     Partners: Male     Birth control/protection: None       Allergies   Allergen Reactions   • Duexis [Ibuprofen-Famotidine] Anaphylaxis     Buffer in medication is what allergic to   • Other Swelling     AVOCADO- EAR AND THROAT SWELLING   • Sulfa Antibiotics Anaphylaxis   • Fish-Derived Products Hives and Nausea And Vomiting   • Procardia [Nifedipine] Swelling and Rash       Prior to Admission medications    Medication Sig Start Date End Date Taking? Authorizing Provider   albuterol (PROVENTIL HFA;VENTOLIN HFA) 108 (90 Base) MCG/ACT inhaler Inhale 2 puffs Every 4 (Four) Hours As Needed for Wheezing.   Yes ProviderJames MD   aspirin 81 MG EC tablet Take 81 mg by mouth Daily.   Yes James Baumann MD   busPIRone (BUSPAR) 10 MG tablet Take 10 mg by mouth As Needed.   Yes ProviderJames MD   Calcium Acetate, Phos Binder,  "(CALCIUM ACETATE PO) Take  by mouth.   Yes James Baumann MD   cholecalciferol (VITAMIN D3) 1000 units tablet Take 1,000 Units by mouth Daily.   Yes James Baumann MD   colesevelam (WELCHOL) 625 MG tablet Take 1,875 mg by mouth as needed.   Yes James Baumann MD   dexlansoprazole (DEXILANT) 60 MG capsule Take 60 mg by mouth Daily.   Yes James Baumann MD   dicyclomine (BENTYL) 20 MG tablet Take 40 mg by mouth every night. 8/2/16  Yes James Baumann MD   phenazopyridine (PYRIDIUM) 200 MG tablet Take 1 tablet by mouth 3 (Three) Times a Day As Needed for bladder spasms for up to 28 days. 3/4/19 4/1/19 Yes Rony Bañuelos MD   propranolol LA (INDERAL LA) 80 MG 24 hr capsule  9/6/17  Yes James Baumann MD   sucralfate (CARAFATE) 1 G tablet Take 1 g by mouth as needed.   Yes James Baumann MD   traMADol (ULTRAM) 50 MG tablet  2/22/19  Yes James Baumann MD   methylPREDNISolone (MEDROL, MARÍA ELENA,) 4 MG tablet  3/1/19 3/21/19  James Baumann MD       Review of Systems   Constitutional: Negative.    HENT: Negative.    Eyes: Negative.    Respiratory: Negative.    Cardiovascular: Negative.    Gastrointestinal: Negative.    Endocrine: Negative.    Genitourinary: Negative.    Musculoskeletal: Negative.    Skin: Negative.    Allergic/Immunologic: Negative.    Neurological: Positive for numbness (left upper extremity).   Hematological: Negative.    Psychiatric/Behavioral: Negative.    All other systems reviewed and are negative.    /94   Pulse 81   Ht 160 cm (63\")   Wt 111 kg (245 lb)   LMP 09/13/2016 Comment: negative hcg noted to chart  SpO2 98%   BMI 43.40 kg/m²     Physical Exam   Constitutional: She is oriented to person, place, and time. She appears well-developed and well-nourished.   HENT:   Head: Normocephalic and atraumatic.   Eyes: Pupils are equal, round, and reactive to light. No scleral icterus.   Neck: Neck supple. No JVD present. Carotid bruit " is not present. No thyromegaly present.   Cardiovascular: Normal rate, regular rhythm and normal heart sounds.   Pulses:       Carotid pulses are 2+ on the right side, and 2+ on the left side.       Femoral pulses are 2+ on the right side, and 2+ on the left side.       Popliteal pulses are 2+ on the right side, and 2+ on the left side.        Dorsalis pedis pulses are 2+ on the right side, and 2+ on the left side.        Posterior tibial pulses are 2+ on the right side, and 2+ on the left side.   Pulmonary/Chest: Effort normal and breath sounds normal.   Abdominal: Soft. Bowel sounds are normal. She exhibits no distension, no abdominal bruit and no mass. There is no hepatosplenomegaly. There is no tenderness.   Musculoskeletal: Normal range of motion. She exhibits tenderness (left cephalic vein). She exhibits no edema.   Lymphadenopathy:     She has no cervical adenopathy.   Neurological: She is alert and oriented to person, place, and time. She has normal strength. No cranial nerve deficit or sensory deficit.   Skin: Skin is warm, dry and intact.   Psychiatric: She has a normal mood and affect.   Nursing note and vitals reviewed.      Patient Active Problem List   Diagnosis   • Endometriosis   • Laryngopharyngeal reflux   • Pharyngoesophageal dysphagia   • Morbid obesity with BMI of 40.0-44.9, adult (CMS/HCC)        Diagnosis Plan   1. Venous aneurysm     2. Left arm pain  US Venous Doppler Upper Extremity Left (duplex)       Plan: After thoroughly evaluating Breonna Bellamy, I believe the best course of action is to perform some additional testing. I would like for her to get a venous ultrasound to better evaluate her venous aneurysm. I would also like for her to bring a copy of her MRI so I can see that as well.  I will see her back in the next 1-2 weeks to see if anything surgically needs to be done. The patient is to continue taking their medications as previously discussed.   This was all discussed in full with  complete understanding.  Thank you for allowing me to participate in the care of your patient.  Please do not hesitate to call with any questions or concerns.  We will keep you aware of any further encounters with Breonna Bellamy.        Sincerely yours,         Raul Haque, Antoine Pradhan, DO

## 2019-03-25 ENCOUNTER — TELEPHONE (OUTPATIENT)
Dept: VASCULAR SURGERY | Facility: CLINIC | Age: 44
End: 2019-03-25

## 2019-03-25 NOTE — TELEPHONE ENCOUNTER
Left message reminding Mrs Bellamy of her appointments for Tuesday, March 26th, 2019. Reminded Mrs Bellamy to [facility] at 1030 am for testing and follow up afterwards at 1145 am with Dr Haque. Also advised if she had any questions or needed to reschedule to please call the office at [phone].

## 2019-03-26 ENCOUNTER — HOSPITAL ENCOUNTER (OUTPATIENT)
Dept: ULTRASOUND IMAGING | Facility: HOSPITAL | Age: 44
Discharge: HOME | End: 2019-03-26
Admitting: SURGERY

## 2019-03-26 ENCOUNTER — OFFICE VISIT (OUTPATIENT)
Dept: VASCULAR SURGERY | Facility: CLINIC | Age: 44
End: 2019-03-26

## 2019-03-26 VITALS
HEART RATE: 89 BPM | DIASTOLIC BLOOD PRESSURE: 86 MMHG | OXYGEN SATURATION: 98 % | BODY MASS INDEX: 43.41 KG/M2 | HEIGHT: 63 IN | WEIGHT: 245 LBS | SYSTOLIC BLOOD PRESSURE: 136 MMHG

## 2019-03-26 DIAGNOSIS — M79.602 ARM PAIN, ANTERIOR, LEFT: Primary | ICD-10-CM

## 2019-03-26 DIAGNOSIS — M79.602 LEFT ARM PAIN: ICD-10-CM

## 2019-03-26 DIAGNOSIS — G56.02 CARPAL TUNNEL SYNDROME ON LEFT: ICD-10-CM

## 2019-03-26 PROCEDURE — 99214 OFFICE O/P EST MOD 30 MIN: CPT | Performed by: SURGERY

## 2019-03-26 PROCEDURE — 93971 EXTREMITY STUDY: CPT

## 2019-03-26 PROCEDURE — 93971 EXTREMITY STUDY: CPT | Performed by: SURGERY

## 2019-03-26 NOTE — PROGRESS NOTES
"3/26/2019      Gavin Yu MD  [address] [place] [address]    Breonna Bellamy  1975    Chief Complaint   Patient presents with   • Follow-up     5 day f/u with US venous doppler of LUE.         Dear Gavin Yu MD    HPI  I had the pleasure of seeing your patient Breonna Bellamy in the office today.  As you recall, Breonna Bellamy is a 43 y.o.  female who you are currently following for left upper extremity pain.  She is following with Dr. Wharton for workup of lymphoma.  She reports having blood drawn from cephalic vein with tourniquet applied with pain since December.  This is significantly affecting her daily life.  She reports history of mini strokes.  She has no strokelike symptoms at this time.  She is maintained on aspirin and Welchol. 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  She also had a venous duplex performed and she is now back today for follow-up with all the testing for me to review.      Review of Systems   Constitutional: Negative.    HENT: Negative.    Eyes: Negative.    Respiratory: Negative.    Cardiovascular: Negative.    Gastrointestinal: Negative.    Endocrine: Negative.    Genitourinary: Negative.    Musculoskeletal: Negative.    Skin: Negative.    Allergic/Immunologic: Negative.    Neurological: Positive for numbness (left upper extremity).   Hematological: Negative.    Psychiatric/Behavioral: Negative.    All other systems reviewed and are negative.    /86   Pulse 89   Ht 160 cm (63\")   Wt 111 kg (245 lb)   LMP 09/13/2016 Comment: negative hcg noted to chart  SpO2 98%   BMI 43.40 kg/m²     Physical Exam   Constitutional: She is oriented to person, place, and time. She appears well-developed and well-nourished.   HENT:   Head: Normocephalic and atraumatic.   Eyes: Pupils are equal, round, and reactive to light. No scleral icterus.   Neck: Neck supple. No JVD present. Carotid bruit is not present. No thyromegaly present. "   Cardiovascular: Normal rate, regular rhythm and normal heart sounds.   Pulses:       Carotid pulses are 2+ on the right side, and 2+ on the left side.       Femoral pulses are 2+ on the right side, and 2+ on the left side.       Popliteal pulses are 2+ on the right side, and 2+ on the left side.        Dorsalis pedis pulses are 2+ on the right side, and 2+ on the left side.        Posterior tibial pulses are 2+ on the right side, and 2+ on the left side.   Pulmonary/Chest: Effort normal and breath sounds normal.   Abdominal: Soft. Bowel sounds are normal. She exhibits no distension, no abdominal bruit and no mass. There is no hepatosplenomegaly. There is no tenderness.   Musculoskeletal: Normal range of motion. She exhibits tenderness (left cephalic vein). She exhibits no edema.   Lymphadenopathy:     She has no cervical adenopathy.   Neurological: She is alert and oriented to person, place, and time. She has normal strength. No cranial nerve deficit or sensory deficit.   Skin: Skin is warm, dry and intact.   Psychiatric: She has a normal mood and affect.   Nursing note and vitals reviewed.      Patient Active Problem List   Diagnosis   • Endometriosis   • Laryngopharyngeal reflux   • Pharyngoesophageal dysphagia   • Morbid obesity with BMI of 40.0-44.9, adult (CMS/[facility])        Diagnosis Plan   1. Arm pain, anterior, left     2. Carpal tunnel syndrome on left         Plan: After thoroughly evaluating Breonna Bellamy, I believe the best course of action is to remain conservative from a vascular surgery standpoint.  I carefully reviewed her venous duplex of her left upper extremity as well as the MRI.  There is no evidence of any venous aneurysms in either the superficial or deep venous system.  At this point, I would have to pursue some form of nerve problem like carpal tunnel syndrome.  She may need a forearm brace that would help with golfer's elbow.  She can follow-up on a as needed basis.  The patient is to  continue taking their medications as previously discussed.   This was all discussed in full with complete understanding.  Thank you for allowing me to participate in the care of your patient.  Please do not hesitate to call with any questions or concerns.  We will keep you aware of any further encounters with Breonna Bellamy.        Sincerely yours,         Raul Haque, Antoine Pradhan, DO

## 2019-07-10 ENCOUNTER — OFFICE VISIT (OUTPATIENT)
Dept: NEUROSURGERY | Age: 44
End: 2019-07-10
Payer: COMMERCIAL

## 2019-07-10 VITALS
HEART RATE: 78 BPM | DIASTOLIC BLOOD PRESSURE: 74 MMHG | SYSTOLIC BLOOD PRESSURE: 122 MMHG | OXYGEN SATURATION: 98 % | HEIGHT: 63 IN | BODY MASS INDEX: 42.7 KG/M2 | WEIGHT: 241 LBS

## 2019-07-10 DIAGNOSIS — R41.3 MEMORY LOSS: Primary | ICD-10-CM

## 2019-07-10 DIAGNOSIS — M79.602 PAIN OF LEFT UPPER EXTREMITY: ICD-10-CM

## 2019-07-10 PROCEDURE — 99204 OFFICE O/P NEW MOD 45 MIN: CPT | Performed by: PSYCHIATRY & NEUROLOGY

## 2019-07-10 RX ORDER — COLESEVELAM 180 1/1
1875 TABLET ORAL PRN
COMMUNITY

## 2019-07-10 RX ORDER — TRAMADOL HYDROCHLORIDE 50 MG/1
1 TABLET ORAL EVERY 6 HOURS
COMMUNITY
Start: 2019-02-22

## 2019-07-10 RX ORDER — OYSTER SHELL CALCIUM WITH VITAMIN D 500; 200 MG/1; [IU]/1
1 TABLET, FILM COATED ORAL DAILY
COMMUNITY

## 2019-07-10 RX ORDER — ASPIRIN 81 MG/1
81 TABLET ORAL DAILY
COMMUNITY

## 2019-07-10 RX ORDER — DICYCLOMINE HCL 20 MG
40 TABLET ORAL NIGHTLY
COMMUNITY
Start: 2016-08-02

## 2019-07-10 RX ORDER — FERROUS SULFATE 300 MG/5ML
300 LIQUID (ML) ORAL DAILY
COMMUNITY

## 2019-07-10 RX ORDER — BUSPIRONE HYDROCHLORIDE 10 MG/1
10 TABLET ORAL PRN
COMMUNITY

## 2019-07-10 RX ORDER — SUCRALFATE 1 G/1
1 TABLET ORAL 4 TIMES DAILY PRN
COMMUNITY

## 2019-07-10 RX ORDER — PROPRANOLOL HYDROCHLORIDE 80 MG/1
2 CAPSULE, EXTENDED RELEASE ORAL 2 TIMES DAILY
COMMUNITY
Start: 2019-05-31

## 2019-07-10 RX ORDER — DEXLANSOPRAZOLE 60 MG/1
60 CAPSULE, DELAYED RELEASE ORAL DAILY
COMMUNITY

## 2019-07-10 NOTE — PROGRESS NOTES
(OSCAL-500) 500-200 MG-UNIT per tablet Take 1 tablet by mouth daily      ferrous sulfate 300 (60 Fe) MG/5ML syrup Take 300 mg by mouth daily       No current facility-administered medications for this visit. Allergies   Allergen Reactions    Ibuprofen-Famotidine Anaphylaxis     Buffer in medication is what allergic to    Other Swelling     AVOCADO- EAR AND THROAT SWELLING    Sulfa Antibiotics Anaphylaxis    Fish-Derived Products Hives and Nausea And Vomiting    Nifedipine Rash and Swelling       REVIEW OF SYSTEMS    Constitutional: []Fever []Sweat []Chills [] Recent Injury [x] Denies all unless marked  HEENT:[]Headache  [] Head Injury/Hearing Loss  [] Sore Throat  [] Ear Ache/Dizziness  [x] Denies all unless marked  Spine:  [] Neck pain  [] Back pain  [] Sciaticia  [x] Denies all unless marked  Cardiovascular:[]Heart Disease []Chest Pain [] Palpitations  [x] Denies all unless marked  Pulmonary: []Shortness of Breath []Cough   [x] Denies all unless marke  Gastrointestinal: []Nausea  []Vomiting  []Abdominal Pain  []Constipation  []Diarrhea  []Dark Bloody Stools  [x] Denies all unless marked  Psychiatric/Behavioral:[] Depression [x] Anxiety [x] Denies all unless marked  Genitourinary:   [] Frequency  [] Urgency  [] Incontinence [] Pain with Urination  [x] Denies all unless marked  Extremities: []Pain  []Swelling  [x] Denies all unless marked  Musculoskeletal: [] Muscle Pain  [] Joint Pain  [] Arthritis [] Muscle Cramps [] Muscle Twitches  [x] Denies all unless marked  Sleep: [] Insomnia [] Snoring [] Restless Legs [] Sleep Apnea  [] Daytime Sleepiness  [x] Denies all unless marked  Skin:[] Rash [] Skin Discoloration [x] Denies all unless marked   Neurological: []Visual Disturbance/Memory Loss [] Loss of Balance [] Slurred Speech/Weakness [] Seizures  [] Vertigo/Dizziness [x] Denies all unless marked     I have reviewed the above ROS with the patient and agree with the ROS as documented above.

## 2019-07-12 DIAGNOSIS — R41.3 MEMORY LOSS: ICD-10-CM

## 2019-07-12 DIAGNOSIS — M79.602 PAIN OF LEFT UPPER EXTREMITY: ICD-10-CM

## 2019-07-12 LAB
ALBUMIN SERPL-MCNC: 3.9 G/DL (ref 3.5–5.2)
ALP BLD-CCNC: 74 U/L (ref 35–104)
ALT SERPL-CCNC: 33 U/L (ref 5–33)
ANION GAP SERPL CALCULATED.3IONS-SCNC: 15 MMOL/L (ref 7–19)
AST SERPL-CCNC: 29 U/L (ref 5–32)
BASOPHILS ABSOLUTE: 0.1 K/UL (ref 0–0.2)
BASOPHILS RELATIVE PERCENT: 0.5 % (ref 0–1)
BILIRUB SERPL-MCNC: <0.2 MG/DL (ref 0.2–1.2)
BUN BLDV-MCNC: 10 MG/DL (ref 6–20)
C-REACTIVE PROTEIN: 1.42 MG/DL (ref 0–0.5)
CALCIUM SERPL-MCNC: 9.2 MG/DL (ref 8.6–10)
CHLORIDE BLD-SCNC: 102 MMOL/L (ref 98–111)
CO2: 23 MMOL/L (ref 22–29)
CREAT SERPL-MCNC: 0.7 MG/DL (ref 0.5–0.9)
EOSINOPHILS ABSOLUTE: 0.2 K/UL (ref 0–0.6)
EOSINOPHILS RELATIVE PERCENT: 2.1 % (ref 0–5)
GFR NON-AFRICAN AMERICAN: >60
GLUCOSE BLD-MCNC: 161 MG/DL (ref 74–109)
HCT VFR BLD CALC: 41.4 % (ref 37–47)
HEMOGLOBIN: 13.3 G/DL (ref 12–16)
HIV-1 P24 AG: NORMAL
LYMPHOCYTES ABSOLUTE: 3.5 K/UL (ref 1.1–4.5)
LYMPHOCYTES RELATIVE PERCENT: 32 % (ref 20–40)
MCH RBC QN AUTO: 29 PG (ref 27–31)
MCHC RBC AUTO-ENTMCNC: 32.1 G/DL (ref 33–37)
MCV RBC AUTO: 90.4 FL (ref 81–99)
MONOCYTES ABSOLUTE: 0.5 K/UL (ref 0–0.9)
MONOCYTES RELATIVE PERCENT: 4.2 % (ref 0–10)
NEUTROPHILS ABSOLUTE: 6.7 K/UL (ref 1.5–7.5)
NEUTROPHILS RELATIVE PERCENT: 60.4 % (ref 50–65)
PDW BLD-RTO: 12.9 % (ref 11.5–14.5)
PLATELET # BLD: 286 K/UL (ref 130–400)
PMV BLD AUTO: 9.5 FL (ref 9.4–12.3)
POTASSIUM SERPL-SCNC: 4.1 MMOL/L (ref 3.5–5)
RAPID HIV 1&2: NORMAL
RBC # BLD: 4.58 M/UL (ref 4.2–5.4)
RHEUMATOID FACTOR: <10 IU/ML
SEDIMENTATION RATE, ERYTHROCYTE: 16 MM/HR (ref 0–20)
SODIUM BLD-SCNC: 140 MMOL/L (ref 136–145)
T4 FREE: 1.2 NG/DL (ref 0.9–1.7)
TOTAL PROTEIN: 7.4 G/DL (ref 6.6–8.7)
TSH SERPL DL<=0.05 MIU/L-ACNC: 1.06 UIU/ML (ref 0.27–4.2)
VITAMIN B-12: 730 PG/ML (ref 211–946)
WBC # BLD: 11.1 K/UL (ref 4.8–10.8)

## 2019-07-15 ENCOUNTER — LAB REQUISITION (OUTPATIENT)
Dept: LAB | Facility: HOSPITAL | Age: 44
End: 2019-07-15

## 2019-07-15 DIAGNOSIS — Z00.00 ENCOUNTER FOR GENERAL ADULT MEDICAL EXAMINATION WITHOUT ABNORMAL FINDINGS: ICD-10-CM

## 2019-07-15 LAB
ALBUMIN SERPL-MCNC: 3.88 G/DL (ref 3.75–5.01)
ALPHA-1-GLOBULIN: 0.36 G/DL (ref 0.19–0.46)
ALPHA-2-GLOBULIN: 0.95 G/DL (ref 0.48–1.05)
ANA IGG, ELISA: NORMAL
ARSENIC BLOOD: <10 UG/L (ref 0–12)
BETA GLOBULIN: 1.1 G/DL (ref 0.48–1.1)
COPPER: 141.3 UG/DL (ref 80–155)
FERRITIN SERPL-MCNC: 58.5 NG/ML (ref 6.24–137)
GAMMA GLOBULIN: 1.12 G/DL (ref 0.62–1.51)
IRON 24H UR-MRATE: 83 MCG/DL (ref 42–180)
IRON SATN MFR SERPL: 23 % (ref 20–45)
LEAD LEVEL BLOOD: <2 UG/DL (ref 0–4.9)
LYME (B. BURGDORFERI) AB IGG WB: NEGATIVE
LYME AB IGM BY WB:: NEGATIVE
MERCURY BLOOD: <2.5 UG/L (ref 0–10)
PROTEIN ELECTROPHORESIS, SERUM: NORMAL
RPR: NORMAL
SPE/IFE INTERPRETATION: NORMAL
TIBC SERPL-MCNC: 365 MCG/DL (ref 225–420)
TOTAL PROTEIN: 7.4 G/DL (ref 6–8.3)

## 2019-07-15 PROCEDURE — 82728 ASSAY OF FERRITIN: CPT | Performed by: INTERNAL MEDICINE

## 2019-07-15 PROCEDURE — 83540 ASSAY OF IRON: CPT | Performed by: INTERNAL MEDICINE

## 2019-07-15 PROCEDURE — 83550 IRON BINDING TEST: CPT | Performed by: INTERNAL MEDICINE

## 2019-07-16 LAB — CERULOPLASMIN: 29 MG/DL (ref 16–45)

## 2019-07-17 LAB — VITAMIN B1 WHOLE BLOOD: 144 NMOL/L (ref 70–180)

## 2019-07-19 ENCOUNTER — TELEPHONE (OUTPATIENT)
Dept: NEUROSURGERY | Age: 44
End: 2019-07-19

## 2019-07-19 NOTE — TELEPHONE ENCOUNTER
----- Message from Chantale Carpio DO sent at 7/12/2019  4:54 PM CDT -----  Mild elevation in CRP, blood glucose also slightly elevated - follow that up with primary. Follow up remaining labs here.

## 2019-08-21 ENCOUNTER — HOSPITAL ENCOUNTER (OUTPATIENT)
Dept: MRI IMAGING | Age: 44
Discharge: HOME | End: 2019-08-21
Payer: COMMERCIAL

## 2019-08-21 ENCOUNTER — HOSPITAL ENCOUNTER (OUTPATIENT)
Dept: NEUROLOGY | Age: 44
Discharge: HOME | End: 2019-08-21
Payer: COMMERCIAL

## 2019-08-21 DIAGNOSIS — M79.602 PAIN OF LEFT UPPER EXTREMITY: ICD-10-CM

## 2019-08-21 DIAGNOSIS — R41.3 MEMORY LOSS: ICD-10-CM

## 2019-08-21 PROCEDURE — 95819 EEG AWAKE AND ASLEEP: CPT | Performed by: PSYCHIATRY & NEUROLOGY

## 2019-08-21 PROCEDURE — 95819 EEG AWAKE AND ASLEEP: CPT

## 2019-08-21 NOTE — PROCEDURES
CORRELATION:     The absence of epileptiform abnormalities does not preclude a clinical diagnosis of seizures.        Regina Ann DO  Board Certified Neurologist    Date reported: 8/21/2019  Date signed: 8/21/2019

## 2019-09-09 ENCOUNTER — HOSPITAL ENCOUNTER (OUTPATIENT)
Dept: MRI IMAGING | Age: 44
Discharge: HOME | End: 2019-09-09
Payer: COMMERCIAL

## 2019-09-09 DIAGNOSIS — M79.602 PAIN OF LEFT UPPER EXTREMITY: ICD-10-CM

## 2019-09-09 DIAGNOSIS — R41.3 MEMORY LOSS: ICD-10-CM

## 2019-09-09 PROCEDURE — 70553 MRI BRAIN STEM W/O & W/DYE: CPT

## 2019-09-09 PROCEDURE — 6360000004 HC RX CONTRAST MEDICATION: Performed by: PSYCHIATRY & NEUROLOGY

## 2019-09-09 PROCEDURE — A9577 INJ MULTIHANCE: HCPCS | Performed by: PSYCHIATRY & NEUROLOGY

## 2019-09-09 RX ADMIN — GADOBENATE DIMEGLUMINE 20 ML: 529 INJECTION, SOLUTION INTRAVENOUS at 08:52

## 2019-11-11 ENCOUNTER — APPOINTMENT (OUTPATIENT)
Dept: LAB | Facility: HOSPITAL | Age: 44
End: 2019-11-11

## 2019-11-11 DIAGNOSIS — D72.829 LEUKOCYTOSIS, UNSPECIFIED TYPE: Primary | ICD-10-CM

## 2019-11-11 LAB
BASOPHILS # BLD AUTO: 0.05 10*3/MM3 (ref 0–0.2)
BASOPHILS NFR BLD AUTO: 0.6 % (ref 0–1.5)
DEPRECATED RDW RBC AUTO: 39.6 FL (ref 37–54)
EOSINOPHIL # BLD AUTO: 0.2 10*3/MM3 (ref 0–0.4)
EOSINOPHIL NFR BLD AUTO: 2.3 % (ref 0.3–6.2)
ERYTHROCYTE [DISTWIDTH] IN BLOOD BY AUTOMATED COUNT: 12.7 % (ref 12.3–15.4)
FERRITIN SERPL-MCNC: 89.38 NG/ML (ref 13–150)
HCT VFR BLD AUTO: 40.6 % (ref 34–46.6)
HGB BLD-MCNC: 13.7 G/DL (ref 12–15.9)
IMM GRANULOCYTES # BLD AUTO: 0.04 10*3/MM3 (ref 0–0.05)
IMM GRANULOCYTES NFR BLD AUTO: 0.5 % (ref 0–0.5)
IRON 24H UR-MRATE: 72 MCG/DL (ref 37–145)
IRON SATN MFR SERPL: 17 % (ref 20–50)
LYMPHOCYTES # BLD AUTO: 3.03 10*3/MM3 (ref 0.7–3.1)
LYMPHOCYTES NFR BLD AUTO: 34.7 % (ref 19.6–45.3)
MCH RBC QN AUTO: 28.8 PG (ref 26.6–33)
MCHC RBC AUTO-ENTMCNC: 33.7 G/DL (ref 31.5–35.7)
MCV RBC AUTO: 85.5 FL (ref 79–97)
MONOCYTES # BLD AUTO: 0.49 10*3/MM3 (ref 0.1–0.9)
MONOCYTES NFR BLD AUTO: 5.6 % (ref 5–12)
NEUTROPHILS # BLD AUTO: 4.93 10*3/MM3 (ref 1.7–7)
NEUTROPHILS NFR BLD AUTO: 56.3 % (ref 42.7–76)
NRBC BLD AUTO-RTO: 0 /100 WBC (ref 0–0.2)
PLATELET # BLD AUTO: 273 10*3/MM3 (ref 140–450)
PMV BLD AUTO: 9.4 FL (ref 6–12)
RBC # BLD AUTO: 4.75 10*6/MM3 (ref 3.77–5.28)
TIBC SERPL-MCNC: 425 MCG/DL (ref 298–536)
TRANSFERRIN SERPL-MCNC: 285 MG/DL (ref 200–360)
WBC NRBC COR # BLD: 8.74 10*3/MM3 (ref 3.4–10.8)

## 2019-11-11 PROCEDURE — 84466 ASSAY OF TRANSFERRIN: CPT | Performed by: INTERNAL MEDICINE

## 2019-11-11 PROCEDURE — 85025 COMPLETE CBC W/AUTO DIFF WBC: CPT | Performed by: INTERNAL MEDICINE

## 2019-11-11 PROCEDURE — 83540 ASSAY OF IRON: CPT | Performed by: INTERNAL MEDICINE

## 2019-11-11 PROCEDURE — 36415 COLL VENOUS BLD VENIPUNCTURE: CPT | Performed by: INTERNAL MEDICINE

## 2019-11-11 PROCEDURE — 82728 ASSAY OF FERRITIN: CPT | Performed by: INTERNAL MEDICINE

## 2019-11-16 NOTE — PROGRESS NOTES
[facility] ON[facility] GROUP HEMATOLOGY AND ONCOLOGY  [address]  [place] [address] [phone]    Patient Name: Breonna Bellamy  Encounter Date: 11/18/2019  YOB: 1975  Patient Number: [id number]      REASON FOR VISIT: Breonna Bellamy is a 44-year-old female who returns in follow-up of persistent leukocytosis and iron deficiency. She has been on oral iron since 12/10/2018. She is here alone.     DIAGNOSTIC ABNORMALITIES:   1. Review of labs made available from the referring office date back to 06/13/2017. At that time, hemoglobin 12.5, hematocrit 37.8, MCV 82, platelets 359,000, WBC 13.5 with 66 segs, 27 lymphocytes, 5 monocytes, 2 eosinophils (ANC 8.9 and ALC 3.6). CMP was normal with a GFR of 91, calcium 9.4, total protein 7.0, normal liver enzymes. TSH 1.070 (0.45 - 4.5), T4 of 9.3 (4.5 - 12).   2. On 10/24/2018, hemoglobin 13, hematocrit 38.8, MCV 85, platelets 368,000, WBC 12.4 with 65 segs, 29 lymphocytes, 4 monocytes, 2 eosinophils (normal differential). CMP again normal with a BUN of 14, creatinine 0.76, GFR 97, calcium 9.4, total protein 7.1, and normal liver enzymes. Serum iron 60, iron saturation 14%, TIBC 434, ferritin 28 (depressed), B12 of 1336, TSH 1.19, T4 of 8.6, T3 of 140 (each within reference range).  3. Labs, 11/14/2018: Hemoglobin 13.4, hematocrit 41.5, MCV 88.4, platelets 367,000, WBC 12.7 with 61.3 segs (ANC 7.8),31.3 lymphocytes (ALC 4.0), and 7.4 mid cells. CMP is notable for a glucose of 111 otherwise normal with a BUN of 14, creatinine 0.7 (GFR 97.5), calcium 9.0, total protein 7.1, and normal liver enzymes.  (313 - 618). Beta 2 microglobulin 2.24. Serum iron 46, saturation 8.78, ferritin 17% (each depressed), B12 of 1455, folate 9.5, TIBC 524 (each elevated). Urinalysis negative.   4. Comprehensive blood report, 11/14/2018. Mild Leukocytosis, Favor Reactive. COMPREHENSIVE DIAGNOSIS. Peripheral blood: Mild  leukocytosis with a normal relative differential. COMPREHENSIVE COMMENT. The findings in the peripheral blood favor a reactive cause for the patient's mild leukocytosis. FISH for BCR/ABL and molecular studies for JAK2 V617F and calreticulin mutations were performed for further evaluation and are negative.  5. Chest x-ray revealed no acute cardiopulmonary disease.   6. FIDEL, 12/04/2018. Negative  7. Bone marrow, left iliac crest, smears, clot, and core biopsy, 02/11/2019: Normocellular marrow with maturing trilineage hematopoiesis. Normal female chromosome complement. Peripheral blood: Mild leukocytosis. Microscopic diagnosis: 1. Evaluation of the bone marrow biopsy shows a normocellular marrow for the patient's age estimated at 50%. There appears to be maturing trilineage hematopoiesis. There is no increase in blasts population. Megakaryocytes are present and adequate in number with no distinct cytologic atypia. No atypical lymphocytes or plasma cells are seen. 2. Peripheral blood smear shows a mild leukocytosis with a normal differential count. There is no left shift. Circulating blasts are not identified. Red blood cells display normocytic and normochromic indices. Platelets are present in adequate numbers and display normal morphology.    PREVIOUS INTERVENTIONS:   1. Ferrous sulfate 325 p.o. daily beginning 11/14/2018.        Problem List Items Addressed This Visit        Immune and Lymphatic    Leukocytosis - Primary         No history exists.       PAST MEDICAL HISTORY:  ALLERGIES:  Allergies   Allergen Reactions   • Duexis [Ibuprofen-Famotidine] Anaphylaxis     Buffer in medication is what allergic to   • Other Swelling     AVOCADO- EAR AND THROAT SWELLING   • Sulfa Antibiotics Anaphylaxis   • Fish-Derived Products Hives and Nausea And Vomiting   • Procardia [Nifedipine] Swelling and Rash     CURRENT MEDICATIONS:  Outpatient Encounter Medications as of 11/18/2019   Medication Sig Dispense Refill   • albuterol  (PROVENTIL HFA;VENTOLIN HFA) 108 (90 Base) MCG/ACT inhaler Inhale 2 puffs Every 4 (Four) Hours As Needed for Wheezing.     • aspirin 81 MG EC tablet Take 81 mg by mouth Daily.     • busPIRone (BUSPAR) 10 MG tablet Take 10 mg by mouth As Needed.     • Calcium Acetate, Phos Binder, (CALCIUM ACETATE PO) Take  by mouth.     • cholecalciferol (VITAMIN D3) 1000 units tablet Take 1,000 Units by mouth Daily.     • colesevelam (WELCHOL) 625 MG tablet Take 1,875 mg by mouth as needed.     • dexlansoprazole (DEXILANT) 60 MG capsule Take 60 mg by mouth Daily.     • dicyclomine (BENTYL) 20 MG tablet Take 40 mg by mouth every night.     • propranolol LA (INDERAL LA) 80 MG 24 hr capsule      • sucralfate (CARAFATE) 1 G tablet Take 1 g by mouth as needed.     • traMADol (ULTRAM) 50 MG tablet        No facility-administered encounter medications on file as of 2019.      ADULT ILLNESSES:   Leukocytosis (disorder) ( ICD-10:D72.829 ;Elevated white blood cell count, unspecified   Fatigue (finding) ( ICD-10:R53.83 ;Other fatigue   Heart palpitations ( ; ICD-10:R00.2 ;Palpitations )   Hypertension ( ICD-10:I10 ;Essential (primary) hypertension   Iron deficiency ( ICD-10:E61.1 ;Iron deficiency   Irritable bowel syndrome ( ICD-10:K58.9 ;Irritable bowel syndrome without diarrhea   Miscarriage ( multiple; ICD-10:O03.9 ;Complete or unspecified spontaneous  without complication )   Polycystic ovarian syndrome ( ICD-10:E28.2 ;Polycystic ovarian syndrome/endometriosis   Asthma, 2018   PTSD, 2015   Stomach ulcers   Miscarriages   IBS   Left carpal tunnel syndrome    SURGERIES:   Partial hysterectomy (procedure) no ovariectomies for endometriosis, 2016   Nephrolithotomy for removal of calculus, (extraction of kidney stone),  and    Laparoscopic laser destruction of endometriosis,  and    Cholecystectomy   EGD for esophageal dilation, 2018. Dr. Grande   Colonoscopy, 2016.   Left carpal tunnel release, 2019.   Dr. Yu  C-scope 06/19/2019.  Postoperative diagnosis: Normal terminal ileum.  Normal cecum, normal appendiceal orifice and ileocecal valve.  2 small 2 to 3 mm sized polyps.  Random biopsies in the right and left hemicolon performed to rule out microscopic otitis.  Internal hemorrhoids in the rectum.  Plan: Continue WelChol and Bentyl.  Start daily fiber supplement.  Repeat colonoscopy pending biopsy results.        ADULT ILLNESSES:  Patient Active Problem List   Diagnosis Code   • Endometriosis N80.9   • Laryngopharyngeal reflux K21.9   • Pharyngoesophageal dysphagia R13.14   • Morbid obesity with BMI of 40.0-44.9, adult (CMS/[facility]) E66.01, Z68.41   • Leukocytosis D72.829     SURGERIES:  Past Surgical History:   Procedure Laterality Date   • CHOLECYSTECTOMY     • COLONOSCOPY     • CYSTOSCOPY N/A 9/19/2016    Procedure: CYSTOSCOPY;  Surgeon: Jonathan Figueroa MD;  Location:  [facility] OR;  Service:    • CYSTOSCOPY ELECTROHYDRAULIC LITHOTRIPSY     • EYE SURGERY      LASIK   • HYSTEROSCOPY ENDOMETRIAL ABLATION      X2   • KIDNEY STONE SURGERY      removed   • SKIN BIOPSY     • TOTAL LAPAROSCOPIC HYSTERECTOMY N/A 9/19/2016    Procedure: TOTAL LAPAROSCOPIC HYSTERECTOMY WITH DAVINCI SI ROBOT WITH CYSTO;  Surgeon: Jonathan Figueroa MD;  Location:  [facility] OR;  Service:      HEALTH MAINTENANCE ITEMS:  Health Maintenance Due   Topic Date Due   • TDAP/TD VACCINES (1 - Tdap) 06/15/1995   • PAP SMEAR  09/18/2016   • INFLUENZA VACCINE  08/01/2019   • ANNUAL PHYSICAL  10/17/2019       <no information>  Last Completed Colonoscopy     Patient has no health maintenance due at this time          There is no immunization history on file for this patient.  Last Completed Mammogram     Patient has no health maintenance due at this time            FAMILY HISTORY:  Family History   Problem Relation Age of Onset   • Anesthesia problems Mother    • Hypertension Mother    • Anesthesia problems Maternal Grandmother    • Hypertension Father    •  "Arrhythmia Father    • Colon cancer Paternal Grandmother    • Pancreatic cancer Paternal Grandmother    • Liver cancer Paternal Grandfather    • Heart attack Paternal Grandfather    • Ovarian cancer Neg Hx    • Breast cancer Neg Hx    • Uterine cancer Neg Hx      SOCIAL HISTORY:  Social History     Socioeconomic History   • Marital status:      Spouse name: Not on file   • Number of children: Not on file   • Years of education: Not on file   • Highest education level: Not on file   Tobacco Use   • Smoking status: Never Smoker   • Smokeless tobacco: Never Used   Substance and Sexual Activity   • Alcohol use: No     Comment: occasional   • Drug use: No   • Sexual activity: Yes     Partners: Male     Birth control/protection: None       REVIEW OF SYSTEMS:  Constitutional:   The patient's appetite is good but energy is fair, but previously low.  She has lost 2 pounds (in addition to 4 pounds at her prior visit) since her last visit. \"I stay away from carbs and have been walking.\"  She has no fevers, chills, or drenching night sweats. Her sleep habits seem appropriate.  Ear/Nose/Throat/Mouth:   She reports no ear pains, with seasonal sinus symptoms, but no sore throat, nosebleeds, or sore tongue. She has no headaches. She denies any hoarseness, change in voice quality, or hemoptysis.   Ocular:   She reports no eye pain, significant change in visual acuity, double vision, or blurry vision.  Respiratory:   She reports baseline exertional dyspnea and is short of breath with her routine activities. She has no chronic cough, significant shortness of breathing at rest, phlegm production, or unexplained chest wall pain.  Cardiovascular:   She reports non exertional chest pain, chest pressure, and chest heaviness. Is on Inderal. She reports no claudication. She reports palpitations but symptomatic orthostasis. Says that she had a exercise stress test last 09/2018. \"All ok.\"  Gastrointestinal:   She reports chronic " "dysphagia (had EGD), but no nausea, or vomiting. She has postprandial abdominal pain, bloating, cramping, with change in bowel habits, with dark (oral) discoloration of the stool. She reports no rectal bleeding. She reports alternating constipation and diarrhea from IBS. Had a colonoscopy, 06/2019. \"2 polyps.\" Repeat ?  Genitourinary:   She reports no urinary burning, frequency, dribbling, or discoloration. She reports difficulty controlling her bladder. Uses pads. She has no need to urinate frequently through the night. She passes kidney stones every other month, sometimes associated with hematuria. \"Had a KUB last 03/01/2019 that showed 2 stones in the left kidney.\" Says she may have passed a stone sometime in early 07/2019 and another more recently. \"It feels better.\"  Musculoskeletal:   She reports left elbow pains since 12/2018. Says an MRI showed an aneurysm. Has been seen by Dr. Haque on 03/26. Apparently surgery not advised. Says she had seen Dr. Ospina for acupuncture. \"It's helped tremendously.\" She has chronic (poly) arthralgias of the neck, shoulders, feet/toes with myalgias, and nighttime leg cramping.  The left wrist is \"a bit sore\" since the carpal tunnel surgery last 11/12/2019.  Extremities:   She reports no trouble with fluid retention or significant leg swelling.  Endocrine:   She reports no problems with excess thirst, excessive urination, vasomotor instability, or unexplained fatigue.  Heme/Lymphatic:   She reports no unexplained bleeding, bruising, petechial rashes, or swollen glands.  Skin:   She reports no itching, rashes, or lesions which won't heal.  Neuro:   She reports no loss of consciousness, seizures, fainting spells, or dizziness. She reports no weakness of face, arms, or legs. She has no difficulty with speech. She has no tremors. She has paresthesias of the fingers on the left hand that radiates to the left elbow.  Psych:   She denies depression but admits to anxiety. She reports " "no mood swings.       VITAL SIGNS: /78   Pulse 74   Temp 98.2 °F (36.8 °C)   Resp 16   Ht 160 cm (63\")   Wt 108 kg (238 lb 14.4 oz)   LMP 09/13/2016 Comment: negative hcg noted to chart  SpO2 98%   Breastfeeding? No   BMI 42.32 kg/m² Body surface area is 2.08 meters squared.  Pain Score    11/18/19 1055   PainSc: 0-No pain         PHYSICAL EXAMINATION:   General:   She is a pleasant, anxious, well-developed, well-nourished, and modestly-kept female who is comfortable at rest. She arrived in the exam room ambulatory. She appears to be her stated age. Her skin color is normal. She is here alone  Head/Neck:   The patient is anicteric and atraumatic. The mouth and throat are clear. The trachea is midline. The neck is supple without evidence of jugular venous distention or cervical adenopathy.   Eyes:   The pupils are equal, round, and reactive to light. The extraocular movements are full. There is no scleral jaundice or erythema.   Chest:   The respiratory efforts are normal and unhindered. The chest is clear to auscultation and percussion. There are no wheezes, rhonchi, rales, or asymmetry of breath sounds.  Cardiovascular:   The patient has a regular cardiac rate and rhythm without murmurs, rubs, or gallops. The peripheral pulses are equal and full.  Abdomen:   The belly is soft and obese. There is no rebound or guarding. There is no organomegaly, mass-effect, or tenderness. Bowel sounds are active and of normal character.  Extremities:   There is no evidence of cyanosis, clubbing, or edema. There is less point tenderness on the lateral/ventral aspect of her left elbow. The left wrist is wrapped in a soft cast.  Rheumatologic:   There is no overt evidence of rheumatoid deformities of the hands. There is no sausaging of the fingers. There is no sign of active synovitis. The gait is normal.  Cutaneous:   There are no overt rashes, disseminated lesions, purpura, or petechiae.   Lymphatics:   There is no " evidence of adenopathy in the cervical, supraclavicular, axillary, inguinal, or femoral areas. There is no splenomegaly.  Neurologic:   The patient is alert, oriented, cooperative, and pleasant. She is appropriately conversant. She ambulated into the exam room without assistance and transferred from chair to exam table unaided. There is no overt dysfunction of the motor, sensory, cerebellar systems.  Psych:   Mood and affect are appropriate for circumstance. Eye contact is appropriate. Normal judgement and decision making.         LABS    Lab Results - Last 18 Months   Lab Units 11/11/19  0905 07/12/19  1332 03/19/19  1700 02/11/19  0930 10/16/18  0957   HEMOGLOBIN g/dL 13.7 13.3 13.3 14.2 13.0   HEMATOCRIT % 40.6 41.4 42.4 42.9 41.2   MCV fL 85.5 90.4 90.2 86.1 90.9   WBC 10*3/mm3 8.74 11.1* 15.90* 11.35* 14.42*   RDW % 12.7 12.9 13.2 13.5 13.7   MPV fL 9.4 9.5 10.4 9.8  --    PLATELETS 10*3/mm3 273 286 360 344  323 338   IMM GRAN % % 0.5  --  1.4 0.8  --    NEUTROS ABS 10*3/mm3 4.93 6.7 11.90* 6.50 9.38*   LYMPHS ABS 10*3/mm3 3.03 3.5 2.92 3.80 3.98   MONOS ABS 10*3/mm3 0.49 0.50 0.60 0.67 0.65   EOS ABS 10*3/mm3 0.20 0.20 0.08 0.20 0.20   BASOS ABS 10*3/mm3 0.05 0.10 0.17 0.09 0.08   IMMATURE GRANS (ABS) 10*3/mm3 0.04  --  0.23* 0.09*  --    NRBC /100 WBC 0.0  --  0.0 0.0 0.0       Lab Results - Last 18 Months   Lab Units 07/12/19  1332 10/16/18  0957   GLUCOSE mg/dL 161*  --    SODIUM mmol/L 140 138   POTASSIUM mmol/L 4.1 4.4   TOTAL CO2 mmol/L 23 26.0   CHLORIDE mmol/L 102 101   ANION GAP mmol/L 15  --    CREATININE mg/dL 0.7 0.74   BUN mg/dL 10 15   BUN / CREAT RATIO   --  20.3   CALCIUM mg/dL 9.2 9.2   EGFR IF NONAFRICN AM  >60 86   ALK PHOS U/L 74 80   TOTAL PROTEIN g/dL 7.4  --    ALT (SGPT) U/L 33 28   AST (SGOT) U/L 29 22   BILIRUBIN mg/dL <0.2 0.5   ALBUMIN g/dL 3.9 4.20       No results for input(s): MSPIKE, KAPPALAMB, IGLFLC, URICACID, FREEKAPPAL, CEA, LDH, REFLABREPO in the last 44638 hours.    Lab  "Results - Last 18 Months   Lab Units 11/11/19  0905 07/15/19  1700 07/12/19  1332 03/19/19  1700 01/28/19  1700 10/16/18  0957   IRON mcg/dL 72 83  --  83 56  --    TIBC mcg/dL 425 365  --  434* 412  --    IRON SATURATION % 17* 23  --  19* 14*  --    FERRITIN ng/mL 89.38 58.50  --  25.10 23.60  --    TSH uIU/mL  --   --  1.060  --   --  0.993       ASSESSMENT:   1. Leukocytosis, predominant neutrophilia. WBC 8.74; ANC 4.93, 11/11/2019 (prior range: WBC 11.7 - 14.42; ANC 8.0 - 9.38). Evidence indicates a reactive etiology.   2. Iron deficiency without anemia.   3. Irritable bowel syndrome. Last c-scope, 06/19/2019. Normal terminal ileum.  Normal cecum, normal appendiceal orifice and ileocecal valve.  2 small 2 to 3 mm sized polyps.  Random biopsies in the right and left hemicolon performed to rule out microscopic otitis.  Internal hemorrhoids in the rectum.  Plan: Continue WelChol and Bentyl.  Start daily fiber supplement.  Repeat colonoscopy pending biopsy results.    4. History of gastric ulcer.   5. Polycystic ovarian syndrome.   6. Asthma   7. Kidney stones   8. Migratory polyarthralgias, NOS. Fibromyalgia?   9. Left carpal tunnel syndrome. Underwent surgery, 11/12/2019. Dr. Yu.   10. Left arm pain. 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 Had follow-up with Dr. Haque (vascular surgery) on 03/26/2019. No surgery. Acupuncture initiated by Dr. Ospina. \"It's helped.\"    RECOMMENDATIONS:   1.  Re: Apprised of the labs on 11/11/2019 to include the resolution of the neutrophilic leukocytosis otherwise normal CBC. Iron deficiency (Fe saturation less than 20%, ferritin less than 100).   2.  Review report of c-scope 06/19/2019.  Postoperative diagnosis: Normal terminal ileum.  Normal cecum, normal appendiceal orifice and ileocecal valve.  2 small 2 to 3 mm sized polyps.  Random biopsies in the right and left hemicolon performed to rule out microscopic otitis.  Internal hemorrhoids in the rectum.  Plan: " Continue WelChol and Bentyl.  Start daily fiber supplement.  Repeat colonoscopy pending biopsy results.  3. Previously apprised of the bone marrow biopsy, 02/11/2019 (above). Unrevealing.   4. Previously discussed the comprehensive blood report, 11/14 (above). Reactive leukocytosis favored. Negative JAK2 mutation.   5. Previously discussed the chest x-ray (above), 11/14/2018. Negative.   6. Continue ferrous sulfate 325 mg p.o. once daily - has plenty.   7. Observation   8. Continue currently identified medications.   9. Continue ongoing management per primary care and other specialists.   10. Advance Directive discussed and forms given - initially discussed on 03/25/2019.   11. Return to the [place] office in 16 weeks with pre-office CBC and differential serum iron, Fe sat, ferritin.     MEDICAL DECISION MAKING: Moderate Complexity   AMOUNT OF DATA: Moderate   RISK OF COMPLICATIONS: Moderate     TIME SPENT: Face-to-face time on this encounter, as defined by the American Medical Association in the 2019 Current Procedural Terminology codebook; assessment, record review, lab review, planning and education - 30 minutes (greater than 50% face to face).      cc: Antoine Hernandez,

## 2019-11-18 ENCOUNTER — OFFICE VISIT (OUTPATIENT)
Dept: ONCOLOGY | Facility: CLINIC | Age: 44
End: 2019-11-18

## 2019-11-18 VITALS
WEIGHT: 238.9 LBS | BODY MASS INDEX: 42.33 KG/M2 | HEIGHT: 63 IN | RESPIRATION RATE: 16 BRPM | HEART RATE: 74 BPM | SYSTOLIC BLOOD PRESSURE: 122 MMHG | DIASTOLIC BLOOD PRESSURE: 78 MMHG | TEMPERATURE: 98.2 F | OXYGEN SATURATION: 98 %

## 2019-11-18 DIAGNOSIS — D72.829 LEUKOCYTOSIS, UNSPECIFIED TYPE: Primary | ICD-10-CM

## 2019-11-18 DIAGNOSIS — E61.1 IRON DEFICIENCY: ICD-10-CM

## 2019-11-18 PROCEDURE — 99214 OFFICE O/P EST MOD 30 MIN: CPT | Performed by: INTERNAL MEDICINE

## 2019-11-22 ENCOUNTER — OFFICE VISIT (OUTPATIENT)
Dept: NEUROSURGERY | Age: 44
End: 2019-11-22
Payer: COMMERCIAL

## 2019-11-22 VITALS
HEART RATE: 76 BPM | DIASTOLIC BLOOD PRESSURE: 79 MMHG | OXYGEN SATURATION: 97 % | SYSTOLIC BLOOD PRESSURE: 129 MMHG | WEIGHT: 239 LBS | HEIGHT: 63 IN | BODY MASS INDEX: 42.35 KG/M2

## 2019-11-22 DIAGNOSIS — R41.3 MEMORY LOSS: Primary | ICD-10-CM

## 2019-11-22 PROCEDURE — 99213 OFFICE O/P EST LOW 20 MIN: CPT | Performed by: NURSE PRACTITIONER

## 2020-02-20 DIAGNOSIS — Z12.31 ENCOUNTER FOR SCREENING MAMMOGRAM FOR BREAST CANCER: Primary | ICD-10-CM

## 2020-03-10 ENCOUNTER — LAB (OUTPATIENT)
Dept: ONCOLOGY | Facility: CLINIC | Age: 45
End: 2020-03-10

## 2020-03-10 DIAGNOSIS — D72.829 LEUKOCYTOSIS, UNSPECIFIED TYPE: ICD-10-CM

## 2020-03-10 DIAGNOSIS — E61.1 IRON DEFICIENCY: ICD-10-CM

## 2020-03-10 LAB
BASOPHILS # BLD AUTO: 0.04 10*3/MM3 (ref 0–0.2)
BASOPHILS NFR BLD AUTO: 0.4 % (ref 0–1.5)
DEPRECATED RDW RBC AUTO: 40.7 FL (ref 37–54)
EOSINOPHIL # BLD AUTO: 0.17 10*3/MM3 (ref 0–0.4)
EOSINOPHIL NFR BLD AUTO: 1.5 % (ref 0.3–6.2)
ERYTHROCYTE [DISTWIDTH] IN BLOOD BY AUTOMATED COUNT: 12.5 % (ref 12.3–15.4)
FERRITIN SERPL-MCNC: 72.84 NG/ML (ref 13–150)
HCT VFR BLD AUTO: 38.5 % (ref 34–46.6)
HGB BLD-MCNC: 12.8 G/DL (ref 12–15.9)
IMM GRANULOCYTES # BLD AUTO: 0.08 10*3/MM3 (ref 0–0.05)
IMM GRANULOCYTES NFR BLD AUTO: 0.7 % (ref 0–0.5)
IRON 24H UR-MRATE: 69 MCG/DL (ref 37–145)
IRON SATN MFR SERPL: 18 % (ref 20–50)
LYMPHOCYTES # BLD AUTO: 3.07 10*3/MM3 (ref 0.7–3.1)
LYMPHOCYTES NFR BLD AUTO: 26.9 % (ref 19.6–45.3)
MCH RBC QN AUTO: 29 PG (ref 26.6–33)
MCHC RBC AUTO-ENTMCNC: 33.2 G/DL (ref 31.5–35.7)
MCV RBC AUTO: 87.3 FL (ref 79–97)
MONOCYTES # BLD AUTO: 0.59 10*3/MM3 (ref 0.1–0.9)
MONOCYTES NFR BLD AUTO: 5.2 % (ref 5–12)
NEUTROPHILS # BLD AUTO: 7.46 10*3/MM3 (ref 1.7–7)
NEUTROPHILS NFR BLD AUTO: 65.3 % (ref 42.7–76)
PLATELET # BLD AUTO: 259 10*3/MM3 (ref 140–450)
PMV BLD AUTO: 8.8 FL (ref 6–12)
RBC # BLD AUTO: 4.41 10*6/MM3 (ref 3.77–5.28)
TIBC SERPL-MCNC: 390 MCG/DL (ref 298–536)
TRANSFERRIN SERPL-MCNC: 262 MG/DL (ref 200–360)
WBC NRBC COR # BLD: 11.41 10*3/MM3 (ref 3.4–10.8)

## 2020-03-10 PROCEDURE — 85025 COMPLETE CBC W/AUTO DIFF WBC: CPT | Performed by: INTERNAL MEDICINE

## 2020-03-10 PROCEDURE — 84466 ASSAY OF TRANSFERRIN: CPT | Performed by: INTERNAL MEDICINE

## 2020-03-10 PROCEDURE — 82728 ASSAY OF FERRITIN: CPT | Performed by: INTERNAL MEDICINE

## 2020-03-10 PROCEDURE — 83540 ASSAY OF IRON: CPT | Performed by: INTERNAL MEDICINE

## 2020-04-06 ENCOUNTER — APPOINTMENT (OUTPATIENT)
Dept: MAMMOGRAPHY | Facility: HOSPITAL | Age: 45
End: 2020-04-06

## 2020-04-08 ENCOUNTER — TELEPHONE (OUTPATIENT)
Dept: UROLOGY | Facility: CLINIC | Age: 45
End: 2020-04-08

## 2020-04-08 NOTE — TELEPHONE ENCOUNTER
Called pt to let her know we need to reschedule her apt due to the covid19 and had to leave a message. Told her to call back so we can reschedule her. She will need to be moved out 6 weeks to see shireen and have a KUB done prior apt.

## 2020-05-22 ENCOUNTER — TELEPHONE (OUTPATIENT)
Dept: UROLOGY | Facility: CLINIC | Age: 45
End: 2020-05-22

## 2020-05-22 NOTE — TELEPHONE ENCOUNTER
Called patient to cancel appointment with Arline on 05/28. Patient stated that no one told her that she had a follow up with the nurse practitioner and that she wanted to be rescheduled with dr Bañuelos for a fu visit, not the NP. Please call her and reschedule

## 2020-05-26 DIAGNOSIS — N20.0 RECURRENT KIDNEY STONES: Primary | ICD-10-CM

## 2020-05-26 NOTE — TELEPHONE ENCOUNTER
Pt can schedule an appt with Dr. Bañuelos for his next available day. She will need a KUB before hand as she has a history of stones. I will place order for KUB.

## 2020-06-02 ENCOUNTER — OFFICE VISIT (OUTPATIENT)
Dept: OBSTETRICS AND GYNECOLOGY | Facility: CLINIC | Age: 45
End: 2020-06-02

## 2020-06-02 VITALS
WEIGHT: 242 LBS | SYSTOLIC BLOOD PRESSURE: 126 MMHG | DIASTOLIC BLOOD PRESSURE: 78 MMHG | HEIGHT: 63 IN | BODY MASS INDEX: 42.88 KG/M2

## 2020-06-02 DIAGNOSIS — Z12.39 ENCOUNTER FOR SCREENING FOR MALIGNANT NEOPLASM OF BREAST: ICD-10-CM

## 2020-06-02 DIAGNOSIS — R19.00 PELVIC MASS: ICD-10-CM

## 2020-06-02 DIAGNOSIS — Z01.419 WELL WOMAN EXAM WITH ROUTINE GYNECOLOGICAL EXAM: Primary | ICD-10-CM

## 2020-06-02 PROCEDURE — 99396 PREV VISIT EST AGE 40-64: CPT | Performed by: NURSE PRACTITIONER

## 2020-06-02 RX ORDER — FERROUS SULFATE 300 MG/5ML
300 LIQUID (ML) ORAL
COMMUNITY
End: 2020-10-29 | Stop reason: SDUPTHER

## 2020-06-02 RX ORDER — LEVOCETIRIZINE DIHYDROCHLORIDE 5 MG/1
TABLET, FILM COATED ORAL
COMMUNITY
Start: 2020-04-08

## 2020-06-02 NOTE — PROGRESS NOTES
Subjective   Breonna Bellamy is a 44 y.o. female  YOB: 1975        Chief Complaint   Patient presents with   • Gynecologic Exam     Patient here for yearly exam. Last exam was done 10/16/18.  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  Last mammo done 18 at [facility] and was normal. Patient has mammo order already. Patient had a MRI of her hip and the ortho group and states they found a dermoid cyst on the left pelvis.        Gynecologic Exam   The patient's pertinent negatives include no pelvic pain. Pertinent negatives include no abdominal pain, back pain, constipation, diarrhea, dysuria, fever, frequency, hematuria, nausea, rash, sore throat, urgency or vomiting.       The following portions of the patient's history were reviewed and updated as appropriate: allergies, current medications, past family history, past medical history, past social history, past surgical history and problem list.    Allergies   Allergen Reactions   • Duexis [Ibuprofen-Famotidine] Anaphylaxis     Buffer in medication is what allergic to   • Other Swelling     AVOCADO- EAR AND THROAT SWELLING   • Sulfa Antibiotics Anaphylaxis   • Fish-Derived Products Hives and Nausea And Vomiting   • Procardia [Nifedipine] Swelling and Rash       Past Medical History:   Diagnosis Date   • Abnormal Pap smear of cervix    • Anemia    • Asthma    • Cervical dysplasia    • Hypertension    • IBS (irritable bowel syndrome)    • IBS (irritable bowel syndrome)    • IBS (irritable bowel syndrome)    • In vitro fertilization    • Infertility, female    • Kidney stones    • Kidney stones    • Leukocytosis    • LGSIL (low grade squamous intraepithelial dysplasia)    • Polycystic ovarian disease    • PONV (postoperative nausea and vomiting)    • Spontaneous      x2   • Tachycardia        Family History   Problem Relation Age of Onset   • Anesthesia problems Mother    • Hypertension Mother    • Anesthesia problems  Maternal Grandmother    • Hypertension Father    • Arrhythmia Father    • Colon cancer Paternal Grandmother    • Pancreatic cancer Paternal Grandmother    • Liver cancer Paternal Grandfather    • Heart attack Paternal Grandfather    • Ovarian cancer Neg Hx    • Breast cancer Neg Hx    • Uterine cancer Neg Hx        Social History     Socioeconomic History   • Marital status:      Spouse name: Not on file   • Number of children: Not on file   • Years of education: Not on file   • Highest education level: Not on file   Tobacco Use   • Smoking status: Never Smoker   • Smokeless tobacco: Never Used   Substance and Sexual Activity   • Alcohol use: No     Comment: occasional   • Drug use: No   • Sexual activity: Yes     Partners: Male     Birth control/protection: None         Current Outpatient Medications:   •  albuterol (PROVENTIL HFA;VENTOLIN HFA) 108 (90 Base) MCG/ACT inhaler, Inhale 2 puffs Every 4 (Four) Hours As Needed for Wheezing., Disp: , Rfl:   •  busPIRone (BUSPAR) 10 MG tablet, Take 10 mg by mouth As Needed., Disp: , Rfl:   •  Calcium Acetate, Phos Binder, (CALCIUM ACETATE PO), Take  by mouth., Disp: , Rfl:   •  cholecalciferol (VITAMIN D3) 1000 units tablet, Take 1,000 Units by mouth Daily., Disp: , Rfl:   •  colesevelam (WELCHOL) 625 MG tablet, Take 1,875 mg by mouth as needed., Disp: , Rfl:   •  dexlansoprazole (DEXILANT) 60 MG capsule, Take 60 mg by mouth Daily., Disp: , Rfl:   •  dicyclomine (BENTYL) 20 MG tablet, Take 40 mg by mouth every night., Disp: , Rfl:   •  ferrous sulfate 300 (60 Fe) MG/5ML syrup, Take 300 mg by mouth., Disp: , Rfl:   •  levocetirizine (XYZAL) 5 MG tablet, , Disp: , Rfl:   •  progesterone (PROMETRIUM) 100 MG capsule, , Disp: , Rfl:   •  propranolol LA (INDERAL LA) 80 MG 24 hr capsule, , Disp: , Rfl:   •  sucralfate (CARAFATE) 1 G tablet, Take 1 g by mouth as needed., Disp: , Rfl:   •  traMADol (ULTRAM) 50 MG tablet, , Disp: , Rfl:     Patient's last menstrual period was  09/13/2016.    Sexual History:         Could not be calculated    Past Surgical History:   Procedure Laterality Date   • BONE MARROW BIOPSY     • CHOLECYSTECTOMY     • COLONOSCOPY     • CYSTOSCOPY N/A 9/19/2016    Procedure: CYSTOSCOPY;  Surgeon: Jonathan Figueroa MD;  Location: [facility] OR;  Service:    • CYSTOSCOPY ELECTROHYDRAULIC LITHOTRIPSY     • EYE SURGERY      LASIK   • HYSTEROSCOPY ENDOMETRIAL ABLATION      X2   • KIDNEY STONE SURGERY      removed   • SALPINGECTOMY Bilateral    • SKIN BIOPSY     • TOTAL LAPAROSCOPIC HYSTERECTOMY N/A 9/19/2016    Procedure: TOTAL LAPAROSCOPIC HYSTERECTOMY WITH DAVINCI SI ROBOT WITH CYSTO;  Surgeon: Jonathan Figueroa MD;  Location: [facility] OR;  Service:        Review of Systems   Constitutional: Negative for activity change, appetite change, fatigue, fever, unexpected weight gain and unexpected weight loss.   HENT: Negative for congestion, ear pain, hearing loss, nosebleeds, rhinorrhea, sore throat, tinnitus and trouble swallowing.    Eyes: Negative for blurred vision, pain, discharge, itching and visual disturbance.   Respiratory: Negative for apnea, chest tightness, shortness of breath and wheezing.    Cardiovascular: Negative for chest pain and leg swelling.   Gastrointestinal: Negative for abdominal pain, blood in stool, constipation, diarrhea, nausea, vomiting and GERD.   Endocrine: Negative for heat intolerance, polydipsia and polyuria.   Genitourinary: Negative for breast lump, decreased libido, difficulty urinating, dyspareunia, dysuria, frequency, genital sores, hematuria, menstrual problem, pelvic pain, urgency, urinary incontinence and vaginal pain.   Musculoskeletal: Negative for arthralgias, back pain, joint swelling and myalgias.   Skin: Negative for color change, rash and skin lesions.   Allergic/Immunologic: Negative for environmental allergies, food allergies and immunocompromised state.   Neurological: Negative for dizziness, tremors, seizures, syncope, facial  asymmetry, numbness and headache.   Hematological: Negative for adenopathy. Does not bruise/bleed easily.   Psychiatric/Behavioral: Negative for agitation, hallucinations, sleep disturbance, suicidal ideas and depressed mood. The patient is not nervous/anxious.        Objective   Physical Exam   Constitutional: She is oriented to person, place, and time. She appears well-developed and well-nourished.   HENT:   Head: Normocephalic and atraumatic.   Right Ear: External ear normal.   Left Ear: External ear normal.   Eyes: Conjunctivae and EOM are normal. Right eye exhibits no discharge. Left eye exhibits no discharge. No scleral icterus.   Neck: Normal range of motion. Neck supple. Carotid bruit is not present. No thyromegaly present.   Cardiovascular: Regular rhythm and normal heart sounds.   No murmur heard.  Pulmonary/Chest: Effort normal and breath sounds normal. No respiratory distress. Right breast exhibits no inverted nipple, no mass, no nipple discharge, no skin change and no tenderness. Left breast exhibits no inverted nipple, no mass, no nipple discharge, no skin change and no tenderness. No breast swelling, tenderness, discharge or bleeding. Breasts are symmetrical.   Abdominal: Soft. Bowel sounds are normal. She exhibits no distension and no mass. There is no tenderness. There is no guarding. No hernia. Hernia confirmed negative in the right inguinal area and confirmed negative in the left inguinal area.   Genitourinary: Vagina normal. Rectal exam shows no mass. No breast swelling, tenderness, discharge or bleeding. There is no rash, tenderness, lesion or injury on the right labia. There is no rash, tenderness, lesion or injury on the left labia. No erythema or bleeding in the vagina. No signs of injury around the vagina. No vaginal discharge found.   Genitourinary Comments: Cervix, Uterus and Adnexa are surgically absent.  Urethra and urethral meatus normal  Bladder, normal no prolapse  Perineum and anus  "examined and without lesions   Musculoskeletal: Normal range of motion. She exhibits no edema or tenderness.   Lymphadenopathy:        Head (right side): No submental, no submandibular, no tonsillar, no preauricular, no posterior auricular and no occipital adenopathy present.        Head (left side): No submental, no submandibular, no tonsillar, no preauricular, no posterior auricular and no occipital adenopathy present.     She has no cervical adenopathy.        Right cervical: No superficial cervical, no deep cervical and no posterior cervical adenopathy present.       Left cervical: No superficial cervical, no deep cervical and no posterior cervical adenopathy present.     She has no axillary adenopathy.        Right: No inguinal adenopathy present.        Left: No inguinal adenopathy present.   Neurological: She is alert and oriented to person, place, and time. She exhibits normal muscle tone. Coordination normal.   Skin: Skin is warm and dry. No bruising and no rash noted. No erythema.   Psychiatric: She has a normal mood and affect. Her behavior is normal. Judgment and thought content normal.   Nursing note and vitals reviewed.        Vitals:    06/02/20 1039   BP: 126/78   Weight: 110 kg (242 lb)   Height: 160 cm (63\")       Breonna was seen today for gynecologic exam.    Diagnoses and all orders for this visit:    Well woman exam with routine gynecological exam  Comments:  Normal well woman exam.  Mammogram ordered.      Encounter for screening for malignant neoplasm of breast  Comments:  Mammogram ordered.    Orders:  -     Mammo Screening Digital Tomosynthesis Bilateral With CAD; Future    Pelvic mass  Comments:  Patient recently had a CT done by the orthopedic clinic that showed what they called a \"dermoid\" on her \"left pelvis\"          Patient's Body mass index is 42.87 kg/m². BMI is above normal parameters. Recommendations include: exercise counseling and nutrition counseling.           "   Non-Smoker    MyChart Instructions Given

## 2020-06-18 ENCOUNTER — HOSPITAL ENCOUNTER (OUTPATIENT)
Dept: MAMMOGRAPHY | Facility: HOSPITAL | Age: 45
Discharge: HOME | End: 2020-06-18
Admitting: NURSE PRACTITIONER

## 2020-06-18 DIAGNOSIS — Z12.31 ENCOUNTER FOR SCREENING MAMMOGRAM FOR BREAST CANCER: ICD-10-CM

## 2020-06-18 PROCEDURE — 77063 BREAST TOMOSYNTHESIS BI: CPT

## 2020-06-18 PROCEDURE — 77067 SCR MAMMO BI INCL CAD: CPT

## 2020-06-23 ENCOUNTER — LAB (OUTPATIENT)
Dept: LAB | Facility: HOSPITAL | Age: 45
End: 2020-06-23

## 2020-06-23 DIAGNOSIS — E61.1 IRON DEFICIENCY: ICD-10-CM

## 2020-06-23 DIAGNOSIS — E61.1 IRON DEFICIENCY: Primary | ICD-10-CM

## 2020-06-23 DIAGNOSIS — D72.829 LEUKOCYTOSIS, UNSPECIFIED TYPE: Primary | ICD-10-CM

## 2020-06-23 LAB
BASOPHILS # BLD AUTO: 0.07 10*3/MM3 (ref 0–0.2)
BASOPHILS NFR BLD AUTO: 0.5 % (ref 0–1.5)
DEPRECATED RDW RBC AUTO: 40.6 FL (ref 37–54)
EOSINOPHIL # BLD AUTO: 0.25 10*3/MM3 (ref 0–0.4)
EOSINOPHIL NFR BLD AUTO: 1.9 % (ref 0.3–6.2)
ERYTHROCYTE [DISTWIDTH] IN BLOOD BY AUTOMATED COUNT: 13.1 % (ref 12.3–15.4)
FERRITIN SERPL-MCNC: 109.7 NG/ML (ref 13–150)
HCT VFR BLD AUTO: 40.9 % (ref 34–46.6)
HGB BLD-MCNC: 13.6 G/DL (ref 12–15.9)
IMM GRANULOCYTES # BLD AUTO: 0.08 10*3/MM3 (ref 0–0.05)
IMM GRANULOCYTES NFR BLD AUTO: 0.6 % (ref 0–0.5)
IRON 24H UR-MRATE: 96 MCG/DL (ref 37–145)
IRON SATN MFR SERPL: 24 % (ref 20–50)
LYMPHOCYTES # BLD AUTO: 4.27 10*3/MM3 (ref 0.7–3.1)
LYMPHOCYTES NFR BLD AUTO: 32.6 % (ref 19.6–45.3)
MCH RBC QN AUTO: 28.8 PG (ref 26.6–33)
MCHC RBC AUTO-ENTMCNC: 33.3 G/DL (ref 31.5–35.7)
MCV RBC AUTO: 86.5 FL (ref 79–97)
MONOCYTES # BLD AUTO: 0.64 10*3/MM3 (ref 0.1–0.9)
MONOCYTES NFR BLD AUTO: 4.9 % (ref 5–12)
NEUTROPHILS # BLD AUTO: 7.78 10*3/MM3 (ref 1.7–7)
NEUTROPHILS NFR BLD AUTO: 59.5 % (ref 42.7–76)
NRBC BLD AUTO-RTO: 0 /100 WBC (ref 0–0.2)
PLATELET # BLD AUTO: 299 10*3/MM3 (ref 140–450)
PMV BLD AUTO: 9 FL (ref 6–12)
RBC # BLD AUTO: 4.73 10*6/MM3 (ref 3.77–5.28)
TIBC SERPL-MCNC: 408 MCG/DL (ref 298–536)
TRANSFERRIN SERPL-MCNC: 274 MG/DL (ref 200–360)
WBC NRBC COR # BLD: 13.09 10*3/MM3 (ref 3.4–10.8)

## 2020-06-23 PROCEDURE — 36415 COLL VENOUS BLD VENIPUNCTURE: CPT | Performed by: INTERNAL MEDICINE

## 2020-06-23 PROCEDURE — 84466 ASSAY OF TRANSFERRIN: CPT | Performed by: INTERNAL MEDICINE

## 2020-06-23 PROCEDURE — 85025 COMPLETE CBC W/AUTO DIFF WBC: CPT | Performed by: INTERNAL MEDICINE

## 2020-06-23 PROCEDURE — 82728 ASSAY OF FERRITIN: CPT | Performed by: INTERNAL MEDICINE

## 2020-06-23 PROCEDURE — 83540 ASSAY OF IRON: CPT | Performed by: INTERNAL MEDICINE

## 2020-06-23 NOTE — PROGRESS NOTES
"[facility]W ONC [facility] GROUP HEMATOLOGY AND ONCOLOGY  [address]  [facility] [address] [phone]    Patient Name: Breonna Bellamy  Encounter Date: 06/29/2020  YOB: 1975  Patient Number: [id number]    REASON FOR VISIT: Breonna Bellamy is a 45-year-old female who returns in follow-up of persistent leukocytosis and iron deficiency. She has been on oral iron since 12/10/2018. She is here alone.     INTERVAL HISTORY:  Avulsion fracture right hip last 12/2019.  Says \"they found a benign tumor in my right femur.\"  Has been by orthopedic oncology at [place] - Dr. Genevieve Guallpa, 06/10/2020 who reviewed an MRI and feels that the lesion is consistent with a fibroma.  They want to repeat an MRI in a month.    DIAGNOSTIC ABNORMALITIES:   1. Review of labs made available from the referring office date back to 06/13/2017. At that time, hemoglobin 12.5, hematocrit 37.8, MCV 82, platelets 359,000, WBC 13.5 with 66 segs, 27 lymphocytes, 5 monocytes, 2 eosinophils (ANC 8.9 and ALC 3.6). CMP was normal with a GFR of 91, calcium 9.4, total protein 7.0, normal liver enzymes. TSH 1.070 (0.45 - 4.5), T4 of 9.3 (4.5 - 12).   2. On 10/24/2018, hemoglobin 13, hematocrit 38.8, MCV 85, platelets 368,000, WBC 12.4 with 65 segs, 29 lymphocytes, 4 monocytes, 2 eosinophils (normal differential). CMP again normal with a BUN of 14, creatinine 0.76, GFR 97, calcium 9.4, total protein 7.1, and normal liver enzymes. Serum iron 60, iron saturation 14%, TIBC 434, ferritin 28 (depressed), B12 of 1336, TSH 1.19, T4 of 8.6, T3 of 140 (each within reference range).  3. Labs, 11/14/2018: Hemoglobin 13.4, hematocrit 41.5, MCV 88.4, platelets 367,000, WBC 12.7 with 61.3 segs (ANC 7.8),31.3 lymphocytes (ALC 4.0), and 7.4 mid cells. CMP is notable for a glucose of 111 otherwise normal with a BUN of 14, creatinine 0.7 (GFR 97.5), calcium 9.0, total protein 7.1, and normal liver enzymes.  " (978 - 358). Beta 2 microglobulin 2.24. Serum iron 46, saturation 8.78, ferritin 17% (each depressed), B12 of 1455, folate 9.5, TIBC 524 (each elevated). Urinalysis negative.   4. Comprehensive blood report, 11/14/2018. Mild Leukocytosis, Favor Reactive. COMPREHENSIVE DIAGNOSIS. Peripheral blood: Mild leukocytosis with a normal relative differential. COMPREHENSIVE COMMENT. The findings in the peripheral blood favor a reactive cause for the patient's mild leukocytosis. FISH for BCR/ABL and molecular studies for JAK2 V617F and calreticulin mutations were performed for further evaluation and are negative.  5. Chest x-ray revealed no acute cardiopulmonary disease.   6. FIDEL, 12/04/2018. Negative  7. Bone marrow, left iliac crest, smears, clot, and core biopsy, 02/11/2019: Normocellular marrow with maturing trilineage hematopoiesis. Normal female chromosome complement. Peripheral blood: Mild leukocytosis. Microscopic diagnosis: 1. Evaluation of the bone marrow biopsy shows a normocellular marrow for the patient's age estimated at 50%. There appears to be maturing trilineage hematopoiesis. There is no increase in blasts population. Megakaryocytes are present and adequate in number with no distinct cytologic atypia. No atypical lymphocytes or plasma cells are seen. 2. Peripheral blood smear shows a mild leukocytosis with a normal differential count. There is no left shift. Circulating blasts are not identified. Red blood cells display normocytic and normochromic indices. Platelets are present in adequate numbers and display normal morphology.    PREVIOUS INTERVENTIONS:   1. Ferrous sulfate 325 p.o. daily beginning 11/14/2018.        Problem List Items Addressed This Visit        Digestive    Iron deficiency       Immune and Lymphatic    Leukocytosis - Primary    Relevant Medications    nabumetone (RELAFEN) 500 MG tablet         No history exists.       PAST MEDICAL HISTORY:  ALLERGIES:  Allergies   Allergen Reactions   •  Duexis [Ibuprofen-Famotidine] Anaphylaxis     Buffer in medication is what allergic to   • Other Swelling     AVOCADO- EAR AND THROAT SWELLING   • Sulfa Antibiotics Anaphylaxis   • Fish-Derived Products Hives and Nausea And Vomiting   • Procardia [Nifedipine] Swelling and Rash     CURRENT MEDICATIONS:  Outpatient Encounter Medications as of 6/29/2020   Medication Sig Dispense Refill   • albuterol (PROVENTIL HFA;VENTOLIN HFA) 108 (90 Base) MCG/ACT inhaler Inhale 2 puffs Every 4 (Four) Hours As Needed for Wheezing.     • busPIRone (BUSPAR) 10 MG tablet Take 10 mg by mouth As Needed.     • Calcium Acetate, Phos Binder, (CALCIUM ACETATE PO) Take  by mouth.     • cholecalciferol (VITAMIN D3) 1000 units tablet Take 1,000 Units by mouth Daily.     • colesevelam (WELCHOL) 625 MG tablet Take 1,875 mg by mouth as needed.     • cyclobenzaprine (FLEXERIL) 5 MG tablet      • dexlansoprazole (DEXILANT) 60 MG capsule Take 60 mg by mouth Daily.     • dicyclomine (BENTYL) 20 MG tablet Take 40 mg by mouth every night.     • levocetirizine (XYZAL) 5 MG tablet      • nabumetone (RELAFEN) 500 MG tablet      • propranolol LA (INDERAL LA) 80 MG 24 hr capsule      • sucralfate (CARAFATE) 1 G tablet Take 1 g by mouth as needed.     • traMADol (ULTRAM) 50 MG tablet      • ferrous sulfate 300 (60 Fe) MG/5ML syrup Take 300 mg by mouth.     • progesterone (PROMETRIUM) 100 MG capsule        No facility-administered encounter medications on file as of 6/29/2020.      ADULT ILLNESSES:   Leukocytosis (disorder) ( ICD-10:D72.829 ;Elevated white blood cell count, unspecified   Fatigue (finding) ( ICD-10:R53.83 ;Other fatigue   Heart palpitations ( 1999; ICD-10:R00.2 ;Palpitations )   Hypertension ( ICD-10:I10 ;Essential (primary) hypertension   Iron deficiency ( ICD-10:E61.1 ;Iron deficiency   Irritable bowel syndrome ( ICD-10:K58.9 ;Irritable bowel syndrome without diarrhea   Miscarriage ( multiple; ICD-10:O03.9 ;Complete or unspecified spontaneous   without complication )   Polycystic ovarian syndrome ( ICD-10:E28.2 ;Polycystic ovarian syndrome/endometriosis   Asthma, 2018   PTSD, 2015   Stomach ulcers   Miscarriages   IBS   Left carpal tunnel syndrome    SURGERIES:   Partial hysterectomy (procedure) no ovariectomies for endometriosis, 2016   Nephrolithotomy for removal of calculus, (extraction of kidney stone),  and    Laparoscopic laser destruction of endometriosis,  and    Cholecystectomy   EGD for esophageal dilation, 2018. Dr. Grande   Colonoscopy, .   Left carpal tunnel release, 2019.  Dr. Yu  C-scope 2019.  Postoperative diagnosis: Normal terminal ileum.  Normal cecum, normal appendiceal orifice and ileocecal valve.  2 small 2 to 3 mm sized polyps.  Random biopsies in the right and left hemicolon performed to rule out microscopic otitis.  Internal hemorrhoids in the rectum.  Plan: Continue WelChol and Bentyl.  Start daily fiber supplement.  Repeat colonoscopy pending biopsy results.        ADULT ILLNESSES:  Patient Active Problem List   Diagnosis Code   • Endometriosis N80.9   • Laryngopharyngeal reflux K21.9   • Pharyngoesophageal dysphagia R13.14   • Morbid obesity with BMI of 40.0-44.9, adult (CMS/HCC) E66.01, Z68.41   • Leukocytosis D72.829   • Iron deficiency E61.1   • Asthma J45.909   • HTN (hypertension) I10   • Lesion of left femur M89.9   • Kidney stone N20.0     SURGERIES:  Past Surgical History:   Procedure Laterality Date   • BONE MARROW BIOPSY     • CHOLECYSTECTOMY     • COLONOSCOPY     • CYSTOSCOPY N/A 2016    Procedure: CYSTOSCOPY;  Surgeon: Jonathan Figueroa MD;  Location: [facility] OR;  Service:    • CYSTOSCOPY ELECTROHYDRAULIC LITHOTRIPSY     • EYE SURGERY      LASIK   • HYSTEROSCOPY ENDOMETRIAL ABLATION      X2   • KIDNEY STONE SURGERY      removed   • SALPINGECTOMY Bilateral    • SKIN BIOPSY     • TOTAL LAPAROSCOPIC HYSTERECTOMY N/A 2016    Procedure: TOTAL LAPAROSCOPIC HYSTERECTOMY  WITH DAVINCI SI ROBOT WITH CYSTO;  Surgeon: Jonathan Figueroa MD;  Location: [facility];  Service:      HEALTH MAINTENANCE ITEMS:  Health Maintenance Due   Topic Date Due   • TDAP/TD VACCINES (1 - Tdap) 06/15/1987   • HEPATITIS C SCREENING  09/18/2016   • PAP SMEAR  09/18/2016   • ANNUAL PHYSICAL  10/17/2019       <no information>  Last Completed Colonoscopy     Patient has no health maintenance due at this time          There is no immunization history on file for this patient.  Last Completed Mammogram     Patient has no health maintenance due at this time            FAMILY HISTORY:  Family History   Problem Relation Age of Onset   • Anesthesia problems Mother    • Hypertension Mother    • Anesthesia problems Maternal Grandmother    • Hypertension Father    • Arrhythmia Father    • Colon cancer Paternal Grandmother    • Pancreatic cancer Paternal Grandmother    • Liver cancer Paternal Grandfather    • Heart attack Paternal Grandfather    • Ovarian cancer Neg Hx    • Breast cancer Neg Hx    • Uterine cancer Neg Hx      SOCIAL HISTORY:  Social History     Socioeconomic History   • Marital status:      Spouse name: Not on file   • Number of children: Not on file   • Years of education: Not on file   • Highest education level: Not on file   Tobacco Use   • Smoking status: Never Smoker   • Smokeless tobacco: Never Used   Substance and Sexual Activity   • Alcohol use: No     Comment: occasional   • Drug use: No   • Sexual activity: Yes     Partners: Male     Birth control/protection: None       REVIEW OF SYSTEMS:  Constitutional:   The patient's appetite is good but energy has been low due to the right hip pains.  Is on NSAIDs and Tramadol with hs Flexeril.  Has been prescribed PT which will start on 06/30/2020.  She has regained 4 pounds (had lost 6 pounds at her  2 prior visits) since her last visit. She has no fevers, chills, or drenching night sweats. Her sleep habits seem appropriate.  Ear/Nose/Throat/Mouth:  "  She reports no ear pains, with seasonal sinus symptoms, but no sore throat, nosebleeds, or sore tongue. She has no headaches. She denies any hoarseness, change in voice quality, or hemoptysis.   Ocular:   She reports no eye pain, significant change in visual acuity, double vision, or blurry vision.  Respiratory:   She reports baseline exertional dyspnea and is short of breath with her routine activities. She has no chronic cough, significant shortness of breathing at rest, phlegm production, or unexplained chest wall pain.  Cardiovascular:   She reports non exertional chest pain, chest pressure, and chest heaviness. Is on Inderal. She reports no claudication. She reports palpitations but symptomatic orthostasis. Says that she had a exercise stress test last 09/2018. \"All ok.\"  Gastrointestinal:   She reports improved chronic dysphagia (had EGD with dilation).  She has no nausea, or vomiting. She has postprandial abdominal pain, bloating, cramping, with change in bowel habits, with dark (oral) discoloration of the stool. She reports no rectal bleeding. She reports chronic alternating constipation and diarrhea from IBS. \"Been better.\" Had a colonoscopy, 06/2019. \"2 polyps.\" Dr. Grande follows.  Genitourinary:   She reports no urinary burning, frequency, dribbling, or discoloration. She reports difficulty controlling her bladder. Uses pads. She has no need to urinate frequently through the night. She passes kidney stones every other month, sometimes associated with hematuria. \"Had a KUB last 03/01/2019 that showed 2 stones in the left kidney.\" Says she may have passed a stone sometime in early 07/2019 and another more recently. \"I haven't since.\"  Is followed by Dr. Bañuelos for urology.  Musculoskeletal:   She reports persistent right hip pains since avulsion fracture, 12/25/2020.  She has chronic (poly) arthralgias of the neck, shoulders, feet/toes with myalgias, and nighttime leg cramping.  The left wrist is \"all " "better\" since the carpal tunnel surgery last 11/12/2019.  Extremities:   She reports no trouble with fluid retention or significant leg swelling.  Endocrine:   She reports no problems with excess thirst, excessive urination, vasomotor instability, or unexplained fatigue.  Heme/Lymphatic:   She reports no unexplained bleeding, bruising, petechial rashes, or swollen glands.  Skin:   She reports no itching, rashes, or lesions which won't heal.  Neuro:   She reports no loss of consciousness, seizures, fainting spells, or dizziness. She reports no weakness of face, arms, or legs. She has no difficulty with speech. She has no tremors. She has paresthesias of the fingers on the left hand that radiates to the left elbow.  Psych:   She denies depression but admits to anxiety. She reports no mood swings.       VITAL SIGNS: /70   Pulse 94   Temp 98.1 °F (36.7 °C)   Resp 16   Ht 160 cm (63\")   Wt 110 kg (242 lb 6.4 oz)   LMP 09/13/2016 Comment: negative hcg noted to chart  SpO2 99%   Breastfeeding No   BMI 42.94 kg/m² Body surface area is 2.1 meters squared.  Pain Score    06/29/20 1551   PainSc: 0-No pain         PHYSICAL EXAMINATION:   General:   She is a pleasant, anxious, well-developed, well-nourished, and modestly-kept female who is comfortable at rest. She arrived in the exam room ambulatory. She appears to be her stated age. Her skin color is normal. She is here alone  Head/Neck:   The patient is anicteric and atraumatic. The mouth and throat are clear. The trachea is midline. The neck is supple without evidence of jugular venous distention or cervical adenopathy.   Eyes:   The pupils are equal, round, and reactive to light. The extraocular movements are full. There is no scleral jaundice or erythema.   Chest:   The respiratory efforts are normal and unhindered. The chest is clear to auscultation and percussion. There are no wheezes, rhonchi, rales, or asymmetry of breath sounds.  Cardiovascular:   The " patient has a regular cardiac rate and rhythm without murmurs, rubs, or gallops. The peripheral pulses are equal and full.  Abdomen:   The belly is soft and obese. There is no rebound or guarding. There is no organomegaly, mass-effect, or tenderness. Bowel sounds are active and of normal character.  Extremities:   There is no evidence of cyanosis, clubbing, or edema. There is less point tenderness on the lateral/ventral aspect of her left elbow. The left wrist is wrapped in a soft cast.  Rheumatologic:   There is no overt evidence of rheumatoid deformities of the hands. There is no sausaging of the fingers. There is no sign of active synovitis. The gait is normal.  Cutaneous:   There are no overt rashes, disseminated lesions, purpura, or petechiae.   Lymphatics:   There is no evidence of adenopathy in the cervical, supraclavicular, axillary, inguinal, or femoral areas. There is no splenomegaly.  Neurologic:   The patient is alert, oriented, cooperative, and pleasant. She is appropriately conversant. She ambulated into the exam room without assistance and transferred from chair to exam table unaided. There is no overt dysfunction of the motor, sensory, cerebellar systems.  Psych:   Mood and affect are appropriate for circumstance. Eye contact is appropriate. Normal judgement and decision making.         LABS    Lab Results - Last 18 Months   Lab Units 06/23/20  1449 03/10/20  1134 11/11/19  0905 07/12/19  1332 03/19/19  1700 02/11/19  0930   HEMOGLOBIN g/dL 13.6 12.8 13.7 13.3 13.3 14.2   HEMATOCRIT % 40.9 38.5 40.6 41.4 42.4 42.9   MCV fL 86.5 87.3 85.5 90.4 90.2 86.1   WBC 10*3/mm3 13.09* 11.41* 8.74 11.1* 15.90* 11.35*   RDW % 13.1 12.5 12.7 12.9 13.2 13.5   MPV fL 9.0 8.8 9.4 9.5 10.4 9.8   PLATELETS 10*3/mm3 299 259 273 286 360 344  323   IMM GRAN % % 0.6* 0.7* 0.5  --  1.4 0.8   NEUTROS ABS 10*3/mm3 7.78* 7.46* 4.93 6.7 11.90* 6.50   LYMPHS ABS 10*3/mm3 4.27* 3.07 3.03 3.5 2.92 3.80   MONOS ABS 10*3/mm3 0.64  0.59 0.49 0.50 0.60 0.67   EOS ABS 10*3/mm3 0.25 0.17 0.20 0.20 0.08 0.20   BASOS ABS 10*3/mm3 0.07 0.04 0.05 0.10 0.17 0.09   IMMATURE GRANS (ABS) 10*3/mm3 0.08* 0.08* 0.04  --  0.23* 0.09*   NRBC /100 WBC 0.0  --  0.0  --  0.0 0.0       Lab Results - Last 18 Months   Lab Units 07/12/19  1332   GLUCOSE mg/dL 161*   SODIUM mmol/L 140   POTASSIUM mmol/L 4.1   TOTAL CO2 mmol/L 23   CHLORIDE mmol/L 102   ANION GAP mmol/L 15   CREATININE mg/dL 0.7   BUN mg/dL 10   CALCIUM mg/dL 9.2   EGFR IF NONAFRICN AM  >60   ALK PHOS U/L 74   TOTAL PROTEIN g/dL 7.4   ALT (SGPT) U/L 33   AST (SGOT) U/L 29   BILIRUBIN mg/dL <0.2   ALBUMIN g/dL 3.9       No results for input(s): MSPIKE, KAPPALAMB, IGLFLC, URICACID, FREEKAPPAL, CEA, LDH, REFLABREPO in the last 66834 hours.    Lab Results - Last 18 Months   Lab Units 06/23/20  1449 03/10/20  1134 11/11/19  0905 07/15/19  1700 07/12/19  1332 03/19/19  1700 01/28/19  1700   IRON mcg/dL 96 69 72 83  --  83 56   TIBC mcg/dL 408 390 425 365  --  434* 412   IRON SATURATION % 24 18* 17* 23  --  19* 14*   FERRITIN ng/mL 109.70 72.84 89.38 58.50  --  25.10 23.60   TSH uIU/mL  --   --   --   --  1.060  --   --        ASSESSMENT:   1. Leukocytosis, predominant neutrophilia. WBC 13.09; ANC 7.78, 06/23/2020 (prior range: WBC 8.74- 14.42; ANC 4.93- 9.38). Evidence indicates a reactive etiology.   2. Iron deficiency without anemia.   3. Irritable bowel syndrome. Last c-scope, 06/19/2019. Normal terminal ileum.  Normal cecum, normal appendiceal orifice and ileocecal valve.  2 small 2 to 3 mm sized polyps.  Random biopsies in the right and left hemicolon performed to rule out microscopic otitis.  Internal hemorrhoids in the rectum.  Plan: Continue WelChol and Bentyl.  Start daily fiber supplement.  Repeat colonoscopy pending biopsy results.    4. History of gastric ulcer.   5. Polycystic ovarian syndrome.   6. Asthma   7. Kidney stones   8. Migratory polyarthralgias, NOS. Fibromyalgia?   9. Left carpal  "tunnel syndrome. Underwent surgery, 11/12/2019. Dr. Yu.  Symptomatically resolved.  10. Left arm pain. 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 Had follow-up with Dr. Haque (vascular surgery) on 03/26/2019. No surgery. Acupuncture initiated by Dr. Ospina. \"It's helped.\"          11.  Non-fasting hyperglycemia.  Was on steroid pack for right hip pains        12.  Avulsion fracture right hip last 11/2019.  Has been found to have incidental right intramedullary femur lesion.  Has been by orthopedic oncology at [place] - Dr. Genevieve Guallpa, 06/10/2020 who reviewed an MRI and feels that the lesion is consistent with a fibroma.  They want to repeat an MRI in a month.      RECOMMENDATIONS:   1.   Re: Apprised of the labs on 06/23/2020 to include the recurrent low-grade neutrophilic leukocytosis otherwise normal CBC. Iron repleted (Fe saturation > 20%, ferritin > 100).   2.  Previously reviewed report of c-scope 06/19/2019.  Postoperative diagnosis: Normal terminal ileum.  Normal cecum, normal appendiceal orifice and ileocecal valve.  2 small 2 to 3 mm sized polyps.  Random biopsies in the right and left hemicolon performed to rule out microscopic otitis.  Internal hemorrhoids in the rectum.  Plan: Continue WelChol and Bentyl.  Start daily fiber supplement.  Repeat colonoscopy pending biopsy results.  3. Previously apprised of the bone marrow biopsy, 02/11/2019 (above). Unrevealing.   4. Previously discussed the comprehensive blood report, 11/14 (above). Reactive leukocytosis favored. Negative JAK2 mutation.   5. Previously discussed the chest x-ray (above), 11/14/2018. Negative.   6. Continue ferrous sulfate 325 mg p.o. once daily - has plenty.   7. Observation   8. Continue currently identified medications.   9. Continue ongoing management per primary care and other specialists.   10. Return to the [place] office in 16 weeks with pre-office CBC and differential serum iron, Fe sat, ferritin. "     MEDICAL DECISION MAKING: Moderate Complexity   AMOUNT OF DATA: Moderate   RISK OF COMPLICATIONS: Moderate     I spent 28 total minutes, face-to-face, caring for Breonna today.  Greater than 50% of this time involved counseling and/or coordination of care as documented within this note regarding the patient's illness(es), pros and cons of various treatment options, instructions and/or risk reduction.     cc: Antoine Hernandez, DO

## 2020-06-24 ENCOUNTER — APPOINTMENT (OUTPATIENT)
Dept: LAB | Facility: HOSPITAL | Age: 45
End: 2020-06-24

## 2020-06-29 ENCOUNTER — OFFICE VISIT (OUTPATIENT)
Dept: ONCOLOGY | Facility: CLINIC | Age: 45
End: 2020-06-29

## 2020-06-29 VITALS
HEART RATE: 94 BPM | BODY MASS INDEX: 42.95 KG/M2 | OXYGEN SATURATION: 99 % | DIASTOLIC BLOOD PRESSURE: 70 MMHG | RESPIRATION RATE: 16 BRPM | SYSTOLIC BLOOD PRESSURE: 142 MMHG | WEIGHT: 242.4 LBS | HEIGHT: 63 IN | TEMPERATURE: 98.1 F

## 2020-06-29 DIAGNOSIS — D72.829 LEUKOCYTOSIS, UNSPECIFIED TYPE: Primary | ICD-10-CM

## 2020-06-29 DIAGNOSIS — E61.1 IRON DEFICIENCY: ICD-10-CM

## 2020-06-29 PROBLEM — I10 HTN (HYPERTENSION): Status: ACTIVE | Noted: 2020-06-29

## 2020-06-29 PROBLEM — M89.9 LESION OF LEFT FEMUR: Status: ACTIVE | Noted: 2020-05-01

## 2020-06-29 PROBLEM — N20.0 KIDNEY STONE: Status: ACTIVE | Noted: 2020-06-29

## 2020-06-29 PROBLEM — J45.909 ASTHMA: Status: ACTIVE | Noted: 2020-06-29

## 2020-06-29 PROCEDURE — 99214 OFFICE O/P EST MOD 30 MIN: CPT | Performed by: INTERNAL MEDICINE

## 2020-06-29 RX ORDER — CYCLOBENZAPRINE HCL 5 MG
TABLET ORAL
COMMUNITY
Start: 2020-06-15 | End: 2021-03-01 | Stop reason: ALTCHOICE

## 2020-06-29 RX ORDER — NABUMETONE 500 MG/1
TABLET, FILM COATED ORAL
COMMUNITY
Start: 2020-06-15 | End: 2021-03-01 | Stop reason: ALTCHOICE

## 2020-07-09 ENCOUNTER — OFFICE VISIT (OUTPATIENT)
Dept: OBSTETRICS AND GYNECOLOGY | Facility: CLINIC | Age: 45
End: 2020-07-09

## 2020-07-09 VITALS
HEIGHT: 63 IN | SYSTOLIC BLOOD PRESSURE: 118 MMHG | DIASTOLIC BLOOD PRESSURE: 76 MMHG | BODY MASS INDEX: 42.88 KG/M2 | WEIGHT: 242 LBS

## 2020-07-09 DIAGNOSIS — N83.202 CYST OF LEFT OVARY: Primary | ICD-10-CM

## 2020-07-09 DIAGNOSIS — E55.9 VITAMIN D DEFICIENCY: ICD-10-CM

## 2020-07-09 DIAGNOSIS — Z82.62 FAMILY HISTORY OF OSTEOPOROSIS: ICD-10-CM

## 2020-07-09 DIAGNOSIS — Z87.81 FREQUENT FRACTURES OF BONE: ICD-10-CM

## 2020-07-09 PROCEDURE — 99214 OFFICE O/P EST MOD 30 MIN: CPT | Performed by: NURSE PRACTITIONER

## 2020-07-09 NOTE — PROGRESS NOTES
"    Subjective   Breonna Bellamy is a 45 y.o. female  YOB: 1975      Chief Complaint   Patient presents with   • Ovarian Cyst     Patient here for f/u on possible cyst in her \"left pelvis\" found on CT. Patient had US done today in our office.        HPI    The following portions of the patient's history were reviewed and updated as appropriate: allergies, current medications, past family history, past medical history, past social history, past surgical history and problem list.    Allergies   Allergen Reactions   • Duexis [Ibuprofen-Famotidine] Anaphylaxis     Buffer in medication is what allergic to   • Other Swelling     AVOCADO- EAR AND THROAT SWELLING   • Sulfa Antibiotics Anaphylaxis   • Fish-Derived Products Hives and Nausea And Vomiting   • Procardia [Nifedipine] Swelling and Rash       Past Medical History:   Diagnosis Date   • Abnormal Pap smear of cervix    • Anemia    • Asthma    • Cervical dysplasia    • Hypertension    • IBS (irritable bowel syndrome)    • IBS (irritable bowel syndrome)    • IBS (irritable bowel syndrome)    • In vitro fertilization    • Infertility, female    • Kidney stones    • Kidney stones    • Leukocytosis    • LGSIL (low grade squamous intraepithelial dysplasia)    • Polycystic ovarian disease    • PONV (postoperative nausea and vomiting)    • Spontaneous      x2   • Tachycardia        Family History   Problem Relation Age of Onset   • Anesthesia problems Mother    • Hypertension Mother    • Anesthesia problems Maternal Grandmother    • Hypertension Father    • Arrhythmia Father    • Colon cancer Paternal Grandmother    • Pancreatic cancer Paternal Grandmother    • Liver cancer Paternal Grandfather    • Heart attack Paternal Grandfather    • Ovarian cancer Neg Hx    • Breast cancer Neg Hx    • Uterine cancer Neg Hx        Social History     Socioeconomic History   • Marital status:      Spouse name: Not on file   • Number of children: Not on file   • " Years of education: Not on file   • Highest education level: Not on file   Tobacco Use   • Smoking status: Never Smoker   • Smokeless tobacco: Never Used   Substance and Sexual Activity   • Alcohol use: No     Comment: occasional   • Drug use: No   • Sexual activity: Yes     Partners: Male     Birth control/protection: None         Current Outpatient Medications:   •  albuterol (PROVENTIL HFA;VENTOLIN HFA) 108 (90 Base) MCG/ACT inhaler, Inhale 2 puffs Every 4 (Four) Hours As Needed for Wheezing., Disp: , Rfl:   •  busPIRone (BUSPAR) 10 MG tablet, Take 10 mg by mouth As Needed., Disp: , Rfl:   •  Calcium Acetate, Phos Binder, (CALCIUM ACETATE PO), Take  by mouth., Disp: , Rfl:   •  cholecalciferol (VITAMIN D3) 1000 units tablet, Take 1,000 Units by mouth Daily., Disp: , Rfl:   •  colesevelam (WELCHOL) 625 MG tablet, Take 1,875 mg by mouth as needed., Disp: , Rfl:   •  cyclobenzaprine (FLEXERIL) 5 MG tablet, , Disp: , Rfl:   •  dexlansoprazole (DEXILANT) 60 MG capsule, Take 60 mg by mouth Daily., Disp: , Rfl:   •  dicyclomine (BENTYL) 20 MG tablet, Take 40 mg by mouth every night., Disp: , Rfl:   •  ferrous sulfate 300 (60 Fe) MG/5ML syrup, Take 300 mg by mouth., Disp: , Rfl:   •  levocetirizine (XYZAL) 5 MG tablet, , Disp: , Rfl:   •  nabumetone (RELAFEN) 500 MG tablet, , Disp: , Rfl:   •  propranolol LA (INDERAL LA) 80 MG 24 hr capsule, , Disp: , Rfl:   •  sucralfate (CARAFATE) 1 G tablet, Take 1 g by mouth as needed., Disp: , Rfl:   •  traMADol (ULTRAM) 50 MG tablet, , Disp: , Rfl:     Patient's last menstrual period was 09/13/2016.    Sexual History:         Could not be calculated    Past Surgical History:   Procedure Laterality Date   • BONE MARROW BIOPSY     • CHOLECYSTECTOMY     • COLONOSCOPY     • CYSTOSCOPY N/A 9/19/2016    Procedure: CYSTOSCOPY;  Surgeon: Jonathan Figueroa MD;  Location: [facility];  Service:    • CYSTOSCOPY ELECTROHYDRAULIC LITHOTRIPSY     • EYE SURGERY      LASIK   • HYSTEROSCOPY  "ENDOMETRIAL ABLATION      X2   • KIDNEY STONE SURGERY      removed   • SALPINGECTOMY Bilateral    • SKIN BIOPSY     • TOTAL LAPAROSCOPIC HYSTERECTOMY N/A 9/19/2016    Procedure: TOTAL LAPAROSCOPIC HYSTERECTOMY WITH DAVINCI SI ROBOT WITH CYSTO;  Surgeon: Jonathan Figueroa MD;  Location: [facility];  Service:        Review of Systems    Objective   Physical Exam      Vitals:    07/09/20 1035   BP: 118/76   Weight: 110 kg (242 lb)   Height: 160 cm (63\")       Breonna was seen today for ovarian cyst.    Diagnoses and all orders for this visit:    Cyst of left ovary  Comments:  Patient had a CT done at the orthopedic [place] that showed a \"cyst\" in her left pelvis.  Ultrasound done today was completely normal.  RTO for yearly or sooner as needed.    Frequent fractures of bone  Comments:  Patient recently was diagnosed with a fracture in her hip and a fracture in her foot.  Has never done DEXA bone density testing.  DEXA bone density testing ordered [id number] pending results.  Orders:  -     DEXA Bone Density Axial; Future  -     Vitamin D 25 Hydroxy    Family history of osteoporosis  -     DEXA Bone Density Axial; Future  -     Vitamin D 25 Hydroxy    Vitamin D deficiency  Comments:  History of vitamin D deficiency.  To recheck vitamin D level today.  Orders:  -     Vitamin D 25 Hydroxy          Patient's Body mass index is 42.87 kg/m². BMI is above normal parameters. Recommendations include: exercise counseling and nutrition counseling.             Non-Smoker    MyChart Instructions Given       "

## 2020-07-10 LAB — 25(OH)D3+25(OH)D2 SERPL-MCNC: 47.8 NG/ML (ref 30–100)

## 2020-07-15 DIAGNOSIS — N20.0 RECURRENT KIDNEY STONES: ICD-10-CM

## 2020-07-15 NOTE — PROGRESS NOTES
Ms. Bellamy is 45 y.o. female    CHIEF COMPLAINT: I am here for 1 year follow up for Recurrent Kidney Stones. Imaging was done at [facility].     HPI    Urolithiasis  The location, severity of disease, quality, duration of onset, and treatment initiated are reviewed. This is unchanged from last visit except where updated as need. His interval history is as follows:     She is been having some discomfort in her left lower pelvic area.  Said this been going on for about 2 weeks.  It is not colicky in nature but she describes it more as a pressure.  There is been no hematuria.  She is passed for stone since her last visit with me.  Location: Renal calculi. Both kidneys have had stones in past.   Quality: Past stones have been mostly  CaOxalate or UricAcid. .   Severity: Current stone burden is small left renal calculus.   Duration: Began having stones approximately Approximately 2000. Most recent episode was 3 weeks ago.   Timing: unpredictable.   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     History related to this issue:   - 01/2017: 24 hr urine done showed on ly 1L urine output over 24 hours. All other normal.   The following portions of the patient's history were reviewed and updated as appropriate: allergies, current medications, past family history, past medical history, past social history, past surgical history and problem list.      Review of Systems   Constitutional: Negative for chills and fever.   Gastrointestinal: Negative for abdominal pain, anal bleeding and blood in stool.   Genitourinary: Negative for dysuria, frequency, hematuria and urgency.         Current Outpatient Medications:   •  albuterol (PROVENTIL HFA;VENTOLIN HFA) 108 (90 Base) MCG/ACT inhaler, Inhale 2 puffs Every 4 (Four) Hours As Needed for Wheezing., Disp: , Rfl:   •  busPIRone (BUSPAR) 10 MG tablet, Take 10 mg by mouth As Needed., Disp: , Rfl:   •  Calcium Acetate, Phos Binder, (CALCIUM ACETATE  PO), Take  by mouth., Disp: , Rfl:   •  cholecalciferol (VITAMIN D3) 1000 units tablet, Take 1,000 Units by mouth Daily., Disp: , Rfl:   •  colesevelam (WELCHOL) 625 MG tablet, Take 1,875 mg by mouth as needed., Disp: , Rfl:   •  cyclobenzaprine (FLEXERIL) 5 MG tablet, , Disp: , Rfl:   •  dexlansoprazole (DEXILANT) 60 MG capsule, Take 60 mg by mouth Daily., Disp: , Rfl:   •  dicyclomine (BENTYL) 20 MG tablet, Take 40 mg by mouth every night., Disp: , Rfl:   •  ferrous sulfate 300 (60 Fe) MG/5ML syrup, Take 300 mg by mouth., Disp: , Rfl:   •  levocetirizine (XYZAL) 5 MG tablet, , Disp: , Rfl:   •  nabumetone (RELAFEN) 500 MG tablet, , Disp: , Rfl:   •  propranolol LA (INDERAL LA) 80 MG 24 hr capsule, , Disp: , Rfl:   •  sucralfate (CARAFATE) 1 G tablet, Take 1 g by mouth as needed., Disp: , Rfl:   •  traMADol (ULTRAM) 50 MG tablet, , Disp: , Rfl:   •  HYDROcodone-acetaminophen (Norco) 5-325 MG per tablet, Take 1 tablet by mouth Every 6 (Six) Hours As Needed for Moderate Pain  for up to 2 days., Disp: 6 tablet, Rfl: 0  •  tamsulosin (FLOMAX) 0.4 MG capsule 24 hr capsule, Take 1 capsule by mouth Daily for 14 days. To help pass stone or tolerate stent, Disp: 14 capsule, Rfl: 1    Past Medical History:   Diagnosis Date   • Abnormal Pap smear of cervix    • Anemia    • Asthma    • Cervical dysplasia    • Hypertension    • IBS (irritable bowel syndrome)    • IBS (irritable bowel syndrome)    • IBS (irritable bowel syndrome)    • In vitro fertilization    • Infertility, female    • Kidney stones    • Kidney stones    • Leukocytosis    • LGSIL (low grade squamous intraepithelial dysplasia)    • Polycystic ovarian disease    • PONV (postoperative nausea and vomiting)    • Spontaneous      x2   • Tachycardia        Past Surgical History:   Procedure Laterality Date   • BONE MARROW BIOPSY     • CHOLECYSTECTOMY     • COLONOSCOPY     • CYSTOSCOPY N/A 2016    Procedure: CYSTOSCOPY;  Surgeon: Jonathan Figueroa MD;   "Location:  [facility];  Service:    • CYSTOSCOPY ELECTROHYDRAULIC LITHOTRIPSY     • EYE SURGERY      LASIK   • HYSTEROSCOPY ENDOMETRIAL ABLATION      X2   • KIDNEY STONE SURGERY      removed   • SALPINGECTOMY Bilateral    • SKIN BIOPSY     • TOTAL LAPAROSCOPIC HYSTERECTOMY N/A 9/19/2016    Procedure: TOTAL LAPAROSCOPIC HYSTERECTOMY WITH DAVINCI SI ROBOT WITH CYSTO;  Surgeon: Jonathan Figueroa MD;  Location:  [facility];  Service:        Social History     Socioeconomic History   • Marital status:      Spouse name: Not on file   • Number of children: Not on file   • Years of education: Not on file   • Highest education level: Not on file   Tobacco Use   • Smoking status: Never Smoker   • Smokeless tobacco: Never Used   Substance and Sexual Activity   • Alcohol use: No     Comment: occasional   • Drug use: No   • Sexual activity: Yes     Partners: Male     Birth control/protection: None       Family History   Problem Relation Age of Onset   • Anesthesia problems Mother    • Hypertension Mother    • Anesthesia problems Maternal Grandmother    • Hypertension Father    • Arrhythmia Father    • Colon cancer Paternal Grandmother    • Pancreatic cancer Paternal Grandmother    • Liver cancer Paternal Grandfather    • Heart attack Paternal Grandfather    • Ovarian cancer Neg Hx    • Breast cancer Neg Hx    • Uterine cancer Neg Hx          Temp 97.2 °F (36.2 °C)   Ht 160 cm (63\")   Wt 107 kg (235 lb)   LMP 09/13/2016 Comment: negative hcg noted to chart  BMI 41.63 kg/m²       Physical Exam  Constitutional: Patient is without distress or deformity.  Vital signs are reviewed as above.    Neuro: No confusion; No disorientation; Alert and oriented  Pulmonary: No respiratory distress.   Skin: No pallor or diaphoresis        Data  Results for orders placed or performed in visit on 07/16/20   POC Urinalysis Dipstick, Multipro   Result Value Ref Range    Color Brenda Yellow, Straw, Dark Yellow, Brenda    Clarity, UA Clear Clear    " Glucose, UA Negative Negative, 1000 mg/dL (3+) mg/dL    Bilirubin Negative Negative    Ketones, UA Negative Negative    Specific Gravity  1.020 1.005 - 1.030    Blood, UA Trace (A) Negative    pH, Urine 7.0 5.0 - 8.0    Protein, POC Trace (A) Negative mg/dL    Urobilinogen, UA Normal Normal    Nitrite, UA Negative Negative    Leukocytes Negative Negative         Imaging Results (Last 7 Days)     ** No results found for the last 168 hours. **      Study Result     Exam:   XR ABDOMEN KUB-       Date:  7/16/2020 2:02 PM CDT     History:  Female, age  45 years; kidney stones; N20.0-Calculus of kidney     COMPARISON:  KUB dated 12/22/2016, 2/5/2018, 3/1/2019     Findings :  Nonobstructive bowel gas pattern.      Bilateral nephrolithiasis, left greater than right where the largest  stone measures 4 mm. No definite ureteral stone.     No acute osseous pathology.     IMPRESSION:  1.  Bilateral nephrolithiasis.  This report was finalized on 07/16/2020 14:03 by Dr. Adelaida Malloy MD.     These images were made available to me to review independently.  I also reviewed the radiologist's report described above with regard to the urologic findings.   /Rony Bañuelos MD            Assessment and Plan  Diagnoses and all orders for this visit:    Renal calculus, bilateral  -     POC Urinalysis Dipstick, Multipro  -     HYDROcodone-acetaminophen (Norco) 5-325 MG per tablet; Take 1 tablet by mouth Every 6 (Six) Hours As Needed for Moderate Pain  for up to 2 days.  -     tamsulosin (FLOMAX) 0.4 MG capsule 24 hr capsule; Take 1 capsule by mouth Daily for 14 days. To help pass stone or tolerate stent      It is possible she has a stone.  At this point I will put her on some tamsulosin.  I provided a few pain tablets because she has had multiple stones and is never shown any tendency toward abusing these.  I gave them to her in case she had pain over the weekend but would certainly want a CT if she did this continued.        (Please note  that portions of this note were completed with a voice recognition program.)  Rony Bañuelos MD  07/17/20  13:43

## 2020-07-16 ENCOUNTER — HOSPITAL ENCOUNTER (OUTPATIENT)
Dept: GENERAL RADIOLOGY | Facility: HOSPITAL | Age: 45
Discharge: HOME | End: 2020-07-16
Admitting: UROLOGY

## 2020-07-16 ENCOUNTER — OFFICE VISIT (OUTPATIENT)
Dept: UROLOGY | Facility: CLINIC | Age: 45
End: 2020-07-16

## 2020-07-16 VITALS — TEMPERATURE: 97.2 F | WEIGHT: 235 LBS | HEIGHT: 63 IN | BODY MASS INDEX: 41.64 KG/M2

## 2020-07-16 DIAGNOSIS — R10.2 PELVIC PAIN: ICD-10-CM

## 2020-07-16 DIAGNOSIS — N20.0 RENAL CALCULUS, BILATERAL: Primary | ICD-10-CM

## 2020-07-16 LAB
BILIRUB BLD-MCNC: NEGATIVE MG/DL
CLARITY, POC: CLEAR
COLOR UR: ABNORMAL
GLUCOSE UR STRIP-MCNC: NEGATIVE MG/DL
KETONES UR QL: NEGATIVE
LEUKOCYTE EST, POC: NEGATIVE
NITRITE UR-MCNC: NEGATIVE MG/ML
PH UR: 7 [PH] (ref 5–8)
PROT UR STRIP-MCNC: ABNORMAL MG/DL
RBC # UR STRIP: ABNORMAL /UL
SP GR UR: 1.02 (ref 1–1.03)
UROBILINOGEN UR QL: NORMAL

## 2020-07-16 PROCEDURE — 99213 OFFICE O/P EST LOW 20 MIN: CPT | Performed by: UROLOGY

## 2020-07-16 PROCEDURE — 74018 RADEX ABDOMEN 1 VIEW: CPT

## 2020-07-16 PROCEDURE — 81003 URINALYSIS AUTO W/O SCOPE: CPT | Performed by: UROLOGY

## 2020-07-16 RX ORDER — HYDROCODONE BITARTRATE AND ACETAMINOPHEN 5; 325 MG/1; MG/1
1 TABLET ORAL EVERY 6 HOURS PRN
Qty: 6 TABLET | Refills: 0 | Status: SHIPPED | OUTPATIENT
Start: 2020-07-16 | End: 2020-07-18

## 2020-07-16 RX ORDER — TAMSULOSIN HYDROCHLORIDE 0.4 MG/1
1 CAPSULE ORAL DAILY
Qty: 14 CAPSULE | Refills: 1 | Status: SHIPPED | OUTPATIENT
Start: 2020-07-16 | End: 2020-07-30

## 2020-08-04 ENCOUNTER — APPOINTMENT (OUTPATIENT)
Dept: BONE DENSITY | Facility: HOSPITAL | Age: 45
End: 2020-08-04

## 2020-08-04 ENCOUNTER — HOSPITAL ENCOUNTER (OUTPATIENT)
Dept: BONE DENSITY | Facility: HOSPITAL | Age: 45
Discharge: HOME | End: 2020-08-04
Admitting: NURSE PRACTITIONER

## 2020-08-04 DIAGNOSIS — Z82.62 FAMILY HISTORY OF OSTEOPOROSIS: ICD-10-CM

## 2020-08-04 DIAGNOSIS — Z87.81 FREQUENT FRACTURES OF BONE: ICD-10-CM

## 2020-08-04 PROCEDURE — 77080 DXA BONE DENSITY AXIAL: CPT

## 2020-10-22 ENCOUNTER — LAB (OUTPATIENT)
Dept: LAB | Facility: HOSPITAL | Age: 45
End: 2020-10-22

## 2020-10-22 DIAGNOSIS — E61.1 IRON DEFICIENCY: ICD-10-CM

## 2020-10-22 DIAGNOSIS — D72.829 LEUKOCYTOSIS, UNSPECIFIED TYPE: ICD-10-CM

## 2020-10-22 LAB
BASOPHILS # BLD AUTO: 0.06 10*3/MM3 (ref 0–0.2)
BASOPHILS NFR BLD AUTO: 0.5 % (ref 0–1.5)
DEPRECATED RDW RBC AUTO: 39.4 FL (ref 37–54)
EOSINOPHIL # BLD AUTO: 0.29 10*3/MM3 (ref 0–0.4)
EOSINOPHIL NFR BLD AUTO: 2.5 % (ref 0.3–6.2)
ERYTHROCYTE [DISTWIDTH] IN BLOOD BY AUTOMATED COUNT: 12.6 % (ref 12.3–15.4)
FERRITIN SERPL-MCNC: 111.2 NG/ML (ref 13–150)
HCT VFR BLD AUTO: 37.4 % (ref 34–46.6)
HGB BLD-MCNC: 12.6 G/DL (ref 12–15.9)
IMM GRANULOCYTES # BLD AUTO: 0.08 10*3/MM3 (ref 0–0.05)
IMM GRANULOCYTES NFR BLD AUTO: 0.7 % (ref 0–0.5)
IRON 24H UR-MRATE: 65 MCG/DL (ref 37–145)
IRON SATN MFR SERPL: 16 % (ref 20–50)
LYMPHOCYTES # BLD AUTO: 4.15 10*3/MM3 (ref 0.7–3.1)
LYMPHOCYTES NFR BLD AUTO: 35.9 % (ref 19.6–45.3)
MCH RBC QN AUTO: 29 PG (ref 26.6–33)
MCHC RBC AUTO-ENTMCNC: 33.7 G/DL (ref 31.5–35.7)
MCV RBC AUTO: 86.2 FL (ref 79–97)
MONOCYTES # BLD AUTO: 0.59 10*3/MM3 (ref 0.1–0.9)
MONOCYTES NFR BLD AUTO: 5.1 % (ref 5–12)
NEUTROPHILS NFR BLD AUTO: 55.3 % (ref 42.7–76)
NEUTROPHILS NFR BLD AUTO: 6.39 10*3/MM3 (ref 1.7–7)
NRBC BLD AUTO-RTO: 0 /100 WBC (ref 0–0.2)
PLATELET # BLD AUTO: 286 10*3/MM3 (ref 140–450)
PMV BLD AUTO: 9.6 FL (ref 6–12)
RBC # BLD AUTO: 4.34 10*6/MM3 (ref 3.77–5.28)
TIBC SERPL-MCNC: 419 MCG/DL (ref 298–536)
TRANSFERRIN SERPL-MCNC: 281 MG/DL (ref 200–360)
WBC # BLD AUTO: 11.56 10*3/MM3 (ref 3.4–10.8)

## 2020-10-22 PROCEDURE — 84466 ASSAY OF TRANSFERRIN: CPT

## 2020-10-22 PROCEDURE — 36415 COLL VENOUS BLD VENIPUNCTURE: CPT

## 2020-10-22 PROCEDURE — 85025 COMPLETE CBC W/AUTO DIFF WBC: CPT

## 2020-10-22 PROCEDURE — 83540 ASSAY OF IRON: CPT

## 2020-10-22 PROCEDURE — 82728 ASSAY OF FERRITIN: CPT

## 2020-10-26 DIAGNOSIS — N20.0 RENAL CALCULUS, BILATERAL: ICD-10-CM

## 2020-10-26 RX ORDER — TAMSULOSIN HYDROCHLORIDE 0.4 MG/1
1 CAPSULE ORAL DAILY
Qty: 14 CAPSULE | Refills: 0 | OUTPATIENT
Start: 2020-10-26 | End: 2020-11-09

## 2020-10-26 NOTE — PROGRESS NOTES
[facility] ONC [facility] GROUP HEMATOLOGY AND ONCOLOGY  [address]  Formerly [facility] [address] [phone]    Patient Name: Breonna Bellamy  Encounter Date: 10/29/2020  YOB: 1975  Patient Number: [id number]      REASON FOR VISIT: Breonna Bellamy is a 45-year-old female who returns in follow-up of persistent leukocytosis and iron deficiency. She has been on oral iron since 12/10/2018. She is here alone.       DIAGNOSTIC ABNORMALITIES:   1. Review of labs made available from the referring office date back to 06/13/2017. At that time, hemoglobin 12.5, hematocrit 37.8, MCV 82, platelets 359,000, WBC 13.5 with 66 segs, 27 lymphocytes, 5 monocytes, 2 eosinophils (ANC 8.9 and ALC 3.6). CMP was normal with a GFR of 91, calcium 9.4, total protein 7.0, normal liver enzymes. TSH 1.070 (0.45 - 4.5), T4 of 9.3 (4.5 - 12).   2. On 10/24/2018, hemoglobin 13, hematocrit 38.8, MCV 85, platelets 368,000, WBC 12.4 with 65 segs, 29 lymphocytes, 4 monocytes, 2 eosinophils (normal differential). CMP again normal with a BUN of 14, creatinine 0.76, GFR 97, calcium 9.4, total protein 7.1, and normal liver enzymes. Serum iron 60, iron saturation 14%, TIBC 434, ferritin 28 (depressed), B12 of 1336, TSH 1.19, T4 of 8.6, T3 of 140 (each within reference range).  3. Labs, 11/14/2018: Hemoglobin 13.4, hematocrit 41.5, MCV 88.4, platelets 367,000, WBC 12.7 with 61.3 segs (ANC 7.8),31.3 lymphocytes (ALC 4.0), and 7.4 mid cells. CMP is notable for a glucose of 111 otherwise normal with a BUN of 14, creatinine 0.7 (GFR 97.5), calcium 9.0, total protein 7.1, and normal liver enzymes.  (313 - 618). Beta 2 microglobulin 2.24. Serum iron 46, saturation 8.78, ferritin 17% (each depressed), B12 of 1455, folate 9.5, TIBC 524 (each elevated). Urinalysis negative.   4. Comprehensive blood report, 11/14/2018. Mild Leukocytosis, Favor Reactive. COMPREHENSIVE DIAGNOSIS. Peripheral blood: Mild  leukocytosis with a normal relative differential. COMPREHENSIVE COMMENT. The findings in the peripheral blood favor a reactive cause for the patient's mild leukocytosis. FISH for BCR/ABL and molecular studies for JAK2 V617F and calreticulin mutations were performed for further evaluation and are negative.  5. Chest x-ray revealed no acute cardiopulmonary disease.   6. FIDEL, 12/04/2018. Negative  7. Bone marrow, left iliac crest, smears, clot, and core biopsy, 02/11/2019: Normocellular marrow with maturing trilineage hematopoiesis. Normal female chromosome complement. Peripheral blood: Mild leukocytosis. Microscopic diagnosis: 1. Evaluation of the bone marrow biopsy shows a normocellular marrow for the patient's age estimated at 50%. There appears to be maturing trilineage hematopoiesis. There is no increase in blasts population. Megakaryocytes are present and adequate in number with no distinct cytologic atypia. No atypical lymphocytes or plasma cells are seen. 2. Peripheral blood smear shows a mild leukocytosis with a normal differential count. There is no left shift. Circulating blasts are not identified. Red blood cells display normocytic and normochromic indices. Platelets are present in adequate numbers and display normal morphology.    PREVIOUS INTERVENTIONS:   1. Ferrous sulfate 325 p.o. daily beginning 11/14/2018.        Problem List Items Addressed This Visit        Digestive    Iron deficiency - Primary       Immune and Lymphatic    Leukocytosis        Oncology/Hematology History    No history exists.       PAST MEDICAL HISTORY:  ALLERGIES:  Allergies   Allergen Reactions   • Duexis [Ibuprofen-Famotidine] Anaphylaxis     Buffer in medication is what allergic to   • Other Swelling     AVOCADO- EAR AND THROAT SWELLING   • Sulfa Antibiotics Anaphylaxis   • Fish-Derived Products Hives and Nausea And Vomiting   • Procardia [Nifedipine] Swelling and Rash     CURRENT MEDICATIONS:  Outpatient Encounter Medications  as of 10/29/2020   Medication Sig Dispense Refill   • albuterol (PROVENTIL HFA;VENTOLIN HFA) 108 (90 Base) MCG/ACT inhaler Inhale 2 puffs Every 4 (Four) Hours As Needed for Wheezing.     • busPIRone (BUSPAR) 10 MG tablet Take 10 mg by mouth As Needed.     • Calcium Acetate, Phos Binder, (CALCIUM ACETATE PO) Take  by mouth.     • cholecalciferol (VITAMIN D3) 1000 units tablet Take 1,000 Units by mouth Daily.     • colesevelam (WELCHOL) 625 MG tablet Take 1,875 mg by mouth as needed.     • cyclobenzaprine (FLEXERIL) 5 MG tablet      • dexlansoprazole (DEXILANT) 60 MG capsule Take 60 mg by mouth Daily.     • dicyclomine (BENTYL) 20 MG tablet Take 40 mg by mouth every night.     • ferrous sulfate 300 (60 Fe) MG/5ML syrup Take 300 mg by mouth.     • levocetirizine (XYZAL) 5 MG tablet      • nabumetone (RELAFEN) 500 MG tablet      • pantoprazole (PROTONIX) 40 MG EC tablet      • progesterone (PROMETRIUM) 100 MG capsule      • propranolol LA (INDERAL LA) 80 MG 24 hr capsule      • sucralfate (CARAFATE) 1 G tablet Take 1 g by mouth as needed.     • tamsulosin (FLOMAX) 0.4 MG capsule 24 hr capsule      • traMADol (ULTRAM) 50 MG tablet        No facility-administered encounter medications on file as of 10/29/2020.      ADULT ILLNESSES:   Leukocytosis (disorder) ( ICD-10:D72.829 ;Elevated white blood cell count, unspecified   Fatigue (finding) ( ICD-10:R53.83 ;Other fatigue   Heart palpitations ( ; ICD-10:R00.2 ;Palpitations )   Hypertension ( ICD-10:I10 ;Essential (primary) hypertension   Iron deficiency ( ICD-10:E61.1 ;Iron deficiency   Irritable bowel syndrome ( ICD-10:K58.9 ;Irritable bowel syndrome without diarrhea   Miscarriage ( multiple; ICD-10:O03.9 ;Complete or unspecified spontaneous  without complication )   Polycystic ovarian syndrome ( ICD-10:E28.2 ;Polycystic ovarian syndrome/endometriosis   Asthma, 2018   PTSD, 2015   Stomach ulcers   Miscarriages   IBS   Avulsion fracture right hip last 2019.   "Says \"they found a benign tumor in my right femur.\"  Has been by orthopedic oncology at [place] - Dr. Genevieve Guallpa, 06/10/2020 who reviewed an MRI and feels that the lesion is consistent with a fibroma.  They want to repeat an MRI in a month.Left carpal tunnel syndrome      SURGERIES:   Partial hysterectomy (procedure) no ovariectomies for endometriosis, 2016   Nephrolithotomy for removal of calculus, (extraction of kidney stone), 2002 and 2015   Laparoscopic laser destruction of endometriosis, 2011 and 2014   Cholecystectomy   EGD for esophageal dilation, 01/2018. Dr. Grande   Colonoscopy, 2016.   Left carpal tunnel release, 11/12/2019.  Dr. Yu  C-scope 06/19/2019.  Postoperative diagnosis: Normal terminal ileum.  Normal cecum, normal appendiceal orifice and ileocecal valve.  2 small 2 to 3 mm sized polyps.  Random biopsies in the right and left hemicolon performed to rule out microscopic otitis.  Internal hemorrhoids in the rectum.  Plan: Continue WelChol and Bentyl.  Start daily fiber supplement.  Repeat colonoscopy pending biopsy results.            ADULT ILLNESSES:  Patient Active Problem List   Diagnosis Code   • Endometriosis N80.9   • Laryngopharyngeal reflux K21.9   • Pharyngoesophageal dysphagia R13.14   • Morbid obesity with BMI of 40.0-44.9, adult (CMS/HCC) E66.01, Z68.41   • Leukocytosis D72.829   • Iron deficiency E61.1   • Asthma J45.909   • HTN (hypertension) I10   • Lesion of left femur M89.9   • Kidney stone N20.0     SURGERIES:  Past Surgical History:   Procedure Laterality Date   • BONE MARROW BIOPSY     • CHOLECYSTECTOMY     • COLONOSCOPY     • CYSTOSCOPY N/A 9/19/2016    Procedure: CYSTOSCOPY;  Surgeon: Jonathan Figueroa MD;  Location: [facility] OR;  Service:    • CYSTOSCOPY ELECTROHYDRAULIC LITHOTRIPSY     • EYE SURGERY      LASIK   • HYSTEROSCOPY ENDOMETRIAL ABLATION      X2   • KIDNEY STONE SURGERY      removed   • SALPINGECTOMY Bilateral    • SKIN BIOPSY     • TOTAL LAPAROSCOPIC " HYSTERECTOMY N/A 9/19/2016    Procedure: TOTAL LAPAROSCOPIC HYSTERECTOMY WITH DAVINCI SI ROBOT WITH CYSTO;  Surgeon: Jonathan Figueroa MD;  Location: [facility];  Service:      HEALTH MAINTENANCE ITEMS:  Health Maintenance Due   Topic Date Due   • Pneumococcal Vaccine 0-64 (1 of 1 - PPSV23) 06/15/1982   • TDAP/TD VACCINES (1 - Tdap) 06/15/1995   • HEPATITIS C SCREENING  09/18/2016   • PAP SMEAR  09/18/2016   • ANNUAL PHYSICAL  10/17/2019   • INFLUENZA VACCINE  08/01/2020       <no information>  Last Completed Colonoscopy     Patient has no health maintenance due at this time          There is no immunization history on file for this patient.  Last Completed Mammogram     Patient has no health maintenance due at this time            FAMILY HISTORY:  Family History   Problem Relation Age of Onset   • Anesthesia problems Mother    • Hypertension Mother    • Anesthesia problems Maternal Grandmother    • Hypertension Father    • Arrhythmia Father    • Colon cancer Paternal Grandmother    • Pancreatic cancer Paternal Grandmother    • Liver cancer Paternal Grandfather    • Heart attack Paternal Grandfather    • Ovarian cancer Neg Hx    • Breast cancer Neg Hx    • Uterine cancer Neg Hx      SOCIAL HISTORY:  Social History     Socioeconomic History   • Marital status:      Spouse name: Not on file   • Number of children: Not on file   • Years of education: Not on file   • Highest education level: Not on file   Tobacco Use   • Smoking status: Never Smoker   • Smokeless tobacco: Never Used   Substance and Sexual Activity   • Alcohol use: No     Comment: occasional   • Drug use: No   • Sexual activity: Yes     Partners: Male     Birth control/protection: None       REVIEW OF SYSTEMS:  Constitutional:   The patient's appetite is good but energy has been low due to lingering right hip pains and stress from teaching at work.  Is on NSAIDs but no longer Tramadol nor Flexeril.  Has completed PT.  She has regained 4 pounds (in  "addition to 4 pounds at her prior) since her last visit. She has no fevers, chills, or drenching night sweats. Her sleep habits seem appropriate.  Ear/Nose/Throat/Mouth:   She reports no ear pains, with seasonal sinus symptoms, but no sore throat, nosebleeds, or sore tongue. She has no headaches. She denies any hoarseness, change in voice quality, or hemoptysis.   Ocular:   She reports no eye pain, significant change in visual acuity, double vision, or blurry vision.  Respiratory:   She reports baseline exertional dyspnea and is short of breath with her routine activities. She has no chronic cough, significant shortness of breathing at rest, phlegm production, or unexplained chest wall pain.  Cardiovascular:   She reports non exertional chest pain, chest pressure, and chest heaviness. Remains on Inderal. She reports no claudication. She reports palpitations but symptomatic orthostasis. Says that she had a exercise stress test last 09/2018. \"All ok.\"  Gastrointestinal:   She reports improved chronic dysphagia (had EGD with dilation).  She has no nausea, or vomiting. She has postprandial abdominal pain, bloating, cramping, with change in bowel habits, with dark (oral) discoloration of the stool. She reports no rectal bleeding. She reports chronic alternating constipation and diarrhea from IBS. \"Been pretty good.\" Had a colonoscopy, 06/2019. \"2 polyps.\" Dr. Grande follows.  Genitourinary:   She reports no urinary burning, frequency, dribbling, or discoloration. She reports difficulty controlling her bladder. Uses pads. She has no need to urinate frequently through the night. She passes kidney stones just about every other month, sometimes associated with hematuria.  None lately.  Is followed by Dr. Bañuelos for urology.  Musculoskeletal:   She reports persistent right hip pains since avulsion fracture, 12/25/2020.  \"Better.\" She has chronic (poly) arthralgias of the neck, shoulders, feet/toes with myalgias, and nighttime " "leg cramping.  The left wrist is \"much better\" since the carpal tunnel surgery last 11/12/2019.  Extremities:   She reports no trouble with fluid retention or significant leg swelling.  Endocrine:   She reports no problems with excess thirst, excessive urination, vasomotor instability, or unexplained fatigue.  Heme/Lymphatic:   She reports no unexplained bleeding, bruising, petechial rashes, or swollen glands.  Skin:   She reports no itching, rashes, or lesions which won't heal.  Neuro:   She reports no loss of consciousness, seizures, fainting spells, or dizziness. She reports no weakness of face, arms, or legs. She has no difficulty with speech. She has no tremors. She no longer has paresthesias of the fingers on the left hand that radiated to the left elbow since surgery last 11/2019.  Psych:   She denies depression but admits to anxiety. She reports no mood swings.       VITAL SIGNS: /70   Pulse 81   Temp 97.7 °F (36.5 °C)   Resp 16   Ht 160 cm (63\")   Wt 112 kg (247 lb 4.8 oz)   LMP 09/13/2016 Comment: negative hcg noted to chart  SpO2 98%   Breastfeeding No   BMI 43.81 kg/m² Body surface area is 2.11 meters squared.  Pain Score    10/29/20 1530   PainSc: 0-No pain         PHYSICAL EXAMINATION:   General:   She is a pleasant, anxious, obese, and modestly-kept female who is comfortable at rest. She arrived in the exam room ambulatory. She appears to be her stated age. Her skin color is normal. She is here alone  Head/Neck:   The patient is anicteric and atraumatic. She is wearing a surgical mask today.  The trachea is midline. The neck is supple without evidence of jugular venous distention or cervical adenopathy.   Eyes:   The pupils are equal, round, and reactive to light. The extraocular movements are full. There is no scleral jaundice or erythema.   Chest:   The respiratory efforts are normal and unhindered. The chest is clear to auscultation and percussion. There are no wheezes, rhonchi, " rales, or asymmetry of breath sounds.  Cardiovascular:   The patient has a regular cardiac rate and rhythm without murmurs, rubs, or gallops. The peripheral pulses are equal and full.  Abdomen:   The belly is soft and obese. There is no rebound or guarding. There is no organomegaly, mass-effect, or tenderness. Bowel sounds are active and of normal character.  Extremities:   There is no evidence of cyanosis, clubbing, or edema. There is less point tenderness on the lateral/ventral aspect of her left elbow. The left wrist is no longer in a cast  Rheumatologic:   There is no overt evidence of rheumatoid deformities of the hands. There is no sausaging of the fingers. There is no sign of active synovitis. The gait is slow but otherwise normal.  Cutaneous:   There are no overt rashes, disseminated lesions, purpura, or petechiae.   Lymphatics:   There is no evidence of adenopathy in the cervical, supraclavicular, axillary areas.  Neurologic:   The patient is alert, oriented, cooperative, and pleasant. She is appropriately conversant. She ambulated into the exam room without assistance and transferred from chair to exam table unaided. There is no overt dysfunction of the motor, sensory, cerebellar systems.  Psych:   Mood and affect are appropriate for circumstance. Eye contact is appropriate. Normal judgement and decision making.         LABS    Lab Results - Last 18 Months   Lab Units 10/22/20  1558 06/23/20  1449 03/10/20  1134 11/11/19  0905 07/12/19  1332   HEMOGLOBIN g/dL 12.6 13.6 12.8 13.7 13.3   HEMATOCRIT % 37.4 40.9 38.5 40.6 41.4   MCV fL 86.2 86.5 87.3 85.5 90.4   WBC 10*3/mm3 11.56* 13.09* 11.41* 8.74 11.1*   RDW % 12.6 13.1 12.5 12.7 12.9   MPV fL 9.6 9.0 8.8 9.4 9.5   PLATELETS 10*3/mm3 286 299 259 273 286   IMM GRAN % % 0.7* 0.6* 0.7* 0.5  --    NEUTROS ABS 10*3/mm3 6.39 7.78* 7.46* 4.93 6.7   LYMPHS ABS 10*3/mm3 4.15* 4.27* 3.07 3.03 3.5   MONOS ABS 10*3/mm3 0.59 0.64 0.59 0.49 0.50   EOS ABS 10*3/mm3 0.29  0.25 0.17 0.20 0.20   BASOS ABS 10*3/mm3 0.06 0.07 0.04 0.05 0.10   IMMATURE GRANS (ABS) 10*3/mm3 0.08* 0.08* 0.08* 0.04  --    NRBC /100 WBC 0.0 0.0  --  0.0  --        Lab Results - Last 18 Months   Lab Units 07/12/19  1332   GLUCOSE mg/dL 161*   SODIUM mmol/L 140   POTASSIUM mmol/L 4.1   TOTAL CO2 mmol/L 23   CHLORIDE mmol/L 102   ANION GAP mmol/L 15   CREATININE mg/dL 0.7   BUN mg/dL 10   CALCIUM mg/dL 9.2   EGFR IF NONAFRICN AM  >60   ALK PHOS U/L 74   TOTAL PROTEIN g/dL 7.4   ALT (SGPT) U/L 33   AST (SGOT) U/L 29   BILIRUBIN mg/dL <0.2   ALBUMIN g/dL 3.9       No results for input(s): MSPIKE, KAPPALAMB, IGLFLC, URICACID, FREEKAPPAL, CEA, LDH, REFLABREPO in the last 01762 hours.    Lab Results - Last 18 Months   Lab Units 10/22/20  1558 06/23/20  1449 03/10/20  1134 11/11/19  0905 07/15/19  1700 07/12/19  1332   IRON mcg/dL 65 96 69 72 83  --    TIBC mcg/dL 419 408 390 425 365  --    IRON SATURATION % 16* 24 18* 17* 23  --    FERRITIN ng/mL 111.20 109.70 72.84 89.38 58.50  --    TSH uIU/mL  --   --   --   --   --  1.060       ASSESSMENT:   1. Leukocytosis, predominant neutrophilia. WBC 11.56; ANC 6.39, 10/22/2020 (prior range: WBC 8.74- 14.42; ANC 4.93- 9.38). Evidence indicates a reactive etiology.   2. Iron deficiency without anemia.   3. Irritable bowel syndrome. Last c-scope, 06/19/2019. Normal terminal ileum.  Normal cecum, normal appendiceal orifice and ileocecal valve.  2 small 2 to 3 mm sized polyps.  Random biopsies in the right and left hemicolon performed to rule out microscopic otitis.  Internal hemorrhoids in the rectum.  Plan: Continue WelChol and Bentyl.  Start daily fiber supplement.  Repeat colonoscopy pending biopsy results.    4. History of gastric ulcer.   5. Polycystic ovarian syndrome.   6. Asthma.  Quiescent  7. Kidney stones.  Followed by urology  8. Migratory polyarthralgias, NOS. Fibromyalgia?   9. Left carpal tunnel syndrome. Underwent surgery, 11/12/2019. Dr. Yu.  Symptomatically  resolved.  10. Left arm pain. 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 Had follow-up with Dr. Haque (vascular surgery) on 03/26/2019. No surgery. Symptomatically resolved since carpal tunnel release.   11.  Non-fasting hyperglycemia.  Was on steroid pack for right hip pains.  12.  Avulsion fracture right hip last 11/2019.  Has been found to have incidental right intramedullary femur lesion.  Has been by orthopedic oncology at [place] - Dr. Genevieve Guallpa, 06/10/2020 who reviewed an MRI and feels that the lesion is consistent with a fibroma.  Followed by ortho - Dr. Espitia      RECOMMENDATIONS:   1.   Re: Apprised of the labs on 10/22/2020 to include the recurrent low-grade neutrophilic leukocytosis otherwise normal CBC. Iron barely repleted (Fe saturation < 20%, ferritin > 100).   2.  Previously apprised of the bone marrow biopsy, 02/11/2019 (above). Unrevealing.   3. Previously discussed the comprehensive blood report, 11/14 (above). Reactive leukocytosis favored. Negative JAK2 mutation.   4. Previously discussed the chest x-ray (above), 11/14/2018. Negative.   5. Continue ferrous sulfate 325 mg p.o. once daily - has plenty.   6. Observation   7. Continue currently identified medications.   8. Continue ongoing management per primary care and other specialists.   9. Return to the office in 16 weeks with pre-office CBC and differential serum iron, Fe sat, ferritin.     MEDICAL DECISION MAKING: Moderate Complexity   AMOUNT OF DATA: Moderate   RISK OF COMPLICATIONS: Moderate     I spent 25 total minutes, face-to-face, caring for Breonna today.  Greater than 50% of this time involved counseling and/or coordination of care as documented within this note regarding the patient's illness(es), pros and cons of various treatment options, instructions and/or risk reduction.     cc: Antoine Hernandez,

## 2020-10-29 ENCOUNTER — OFFICE VISIT (OUTPATIENT)
Dept: ONCOLOGY | Facility: CLINIC | Age: 45
End: 2020-10-29

## 2020-10-29 VITALS
TEMPERATURE: 97.7 F | BODY MASS INDEX: 43.82 KG/M2 | SYSTOLIC BLOOD PRESSURE: 128 MMHG | HEIGHT: 63 IN | WEIGHT: 247.3 LBS | OXYGEN SATURATION: 98 % | RESPIRATION RATE: 16 BRPM | HEART RATE: 81 BPM | DIASTOLIC BLOOD PRESSURE: 70 MMHG

## 2020-10-29 DIAGNOSIS — E61.1 IRON DEFICIENCY: Primary | ICD-10-CM

## 2020-10-29 DIAGNOSIS — D72.829 LEUKOCYTOSIS, UNSPECIFIED TYPE: ICD-10-CM

## 2020-10-29 PROCEDURE — 99214 OFFICE O/P EST MOD 30 MIN: CPT | Performed by: INTERNAL MEDICINE

## 2020-10-29 RX ORDER — FERROUS SULFATE 300 MG/5ML
300 LIQUID (ML) ORAL DAILY
Qty: 90 ML | Refills: 1 | Status: SHIPPED | OUTPATIENT
Start: 2020-10-29 | End: 2020-10-30

## 2020-10-29 RX ORDER — TAMSULOSIN HYDROCHLORIDE 0.4 MG/1
CAPSULE ORAL
COMMUNITY
Start: 2020-08-27 | End: 2022-05-16 | Stop reason: SDUPTHER

## 2020-10-29 RX ORDER — PANTOPRAZOLE SODIUM 40 MG/1
TABLET, DELAYED RELEASE ORAL
COMMUNITY
Start: 2020-08-27 | End: 2021-03-01 | Stop reason: ALTCHOICE

## 2020-10-30 RX ORDER — FERROUS SULFATE 325(65) MG
325 TABLET ORAL
Qty: 90 TABLET | Refills: 1 | Status: SHIPPED | OUTPATIENT
Start: 2020-10-30 | End: 2021-06-30

## 2020-12-04 ENCOUNTER — TELEPHONE (OUTPATIENT)
Dept: ONCOLOGY | Facility: CLINIC | Age: 45
End: 2020-12-04

## 2021-02-22 ENCOUNTER — LAB (OUTPATIENT)
Dept: LAB | Facility: HOSPITAL | Age: 46
End: 2021-02-22

## 2021-02-22 DIAGNOSIS — D72.829 LEUKOCYTOSIS, UNSPECIFIED TYPE: ICD-10-CM

## 2021-02-22 DIAGNOSIS — E61.1 IRON DEFICIENCY: ICD-10-CM

## 2021-02-22 LAB
ALBUMIN SERPL-MCNC: 3.8 G/DL (ref 3.5–5.2)
ALBUMIN/GLOB SERPL: 1.2 G/DL
ALP SERPL-CCNC: 91 U/L (ref 39–117)
ALT SERPL W P-5'-P-CCNC: 31 U/L (ref 1–33)
ANION GAP SERPL CALCULATED.3IONS-SCNC: 12 MMOL/L (ref 5–15)
AST SERPL-CCNC: 37 U/L (ref 1–32)
BILIRUB SERPL-MCNC: 0.3 MG/DL (ref 0–1.2)
BUN SERPL-MCNC: 11 MG/DL (ref 6–20)
BUN/CREAT SERPL: 15.3 (ref 7–25)
CALCIUM SPEC-SCNC: 9.2 MG/DL (ref 8.6–10.5)
CHLORIDE SERPL-SCNC: 101 MMOL/L (ref 98–107)
CO2 SERPL-SCNC: 26 MMOL/L (ref 22–29)
CREAT SERPL-MCNC: 0.72 MG/DL (ref 0.57–1)
DEPRECATED RDW RBC AUTO: 43.8 FL (ref 37–54)
EOSINOPHIL # BLD MANUAL: 0.46 10*3/MM3 (ref 0–0.4)
EOSINOPHIL NFR BLD MANUAL: 3.2 % (ref 0.3–6.2)
ERYTHROCYTE [DISTWIDTH] IN BLOOD BY AUTOMATED COUNT: 13 % (ref 12.3–15.4)
FERRITIN SERPL-MCNC: 137.7 NG/ML (ref 13–150)
GFR SERPL CREATININE-BSD FRML MDRD: 88 ML/MIN/1.73
GLOBULIN UR ELPH-MCNC: 3.2 GM/DL
GLUCOSE SERPL-MCNC: 139 MG/DL (ref 65–99)
HCT VFR BLD AUTO: 42.4 % (ref 34–46.6)
HGB BLD-MCNC: 13.3 G/DL (ref 12–15.9)
IRON 24H UR-MRATE: 96 MCG/DL (ref 37–145)
IRON SATN MFR SERPL: 24 % (ref 20–50)
LYMPHOCYTES # BLD MANUAL: 3.23 10*3/MM3 (ref 0.7–3.1)
LYMPHOCYTES NFR BLD MANUAL: 2.2 % (ref 5–12)
LYMPHOCYTES NFR BLD MANUAL: 22.6 % (ref 19.6–45.3)
MCH RBC QN AUTO: 29.1 PG (ref 26.6–33)
MCHC RBC AUTO-ENTMCNC: 31.4 G/DL (ref 31.5–35.7)
MCV RBC AUTO: 92.8 FL (ref 79–97)
MONOCYTES # BLD AUTO: 0.31 10*3/MM3 (ref 0.1–0.9)
NEUTROPHILS # BLD AUTO: 10.27 10*3/MM3 (ref 1.7–7)
NEUTROPHILS NFR BLD MANUAL: 72 % (ref 42.7–76)
PLAT MORPH BLD: NORMAL
PLATELET # BLD AUTO: 302 10*3/MM3 (ref 140–450)
PMV BLD AUTO: 9.6 FL (ref 6–12)
POTASSIUM SERPL-SCNC: 4 MMOL/L (ref 3.5–5.2)
PROT SERPL-MCNC: 7 G/DL (ref 6–8.5)
RBC # BLD AUTO: 4.57 10*6/MM3 (ref 3.77–5.28)
RBC MORPH BLD: NORMAL
SODIUM SERPL-SCNC: 139 MMOL/L (ref 136–145)
TIBC SERPL-MCNC: 402 MCG/DL (ref 298–536)
TRANSFERRIN SERPL-MCNC: 270 MG/DL (ref 200–360)
WBC # BLD AUTO: 14.27 10*3/MM3 (ref 3.4–10.8)
WBC MORPH BLD: NORMAL

## 2021-02-22 PROCEDURE — 84466 ASSAY OF TRANSFERRIN: CPT

## 2021-02-22 PROCEDURE — 83540 ASSAY OF IRON: CPT

## 2021-02-22 PROCEDURE — 36415 COLL VENOUS BLD VENIPUNCTURE: CPT

## 2021-02-22 PROCEDURE — 82728 ASSAY OF FERRITIN: CPT

## 2021-02-22 PROCEDURE — 85007 BL SMEAR W/DIFF WBC COUNT: CPT

## 2021-02-22 PROCEDURE — 85025 COMPLETE CBC W/AUTO DIFF WBC: CPT

## 2021-02-22 PROCEDURE — 80053 COMPREHEN METABOLIC PANEL: CPT

## 2021-03-01 ENCOUNTER — OFFICE VISIT (OUTPATIENT)
Dept: ONCOLOGY | Facility: CLINIC | Age: 46
End: 2021-03-01

## 2021-03-01 VITALS
OXYGEN SATURATION: 99 % | RESPIRATION RATE: 16 BRPM | DIASTOLIC BLOOD PRESSURE: 84 MMHG | HEART RATE: 82 BPM | TEMPERATURE: 97.3 F | SYSTOLIC BLOOD PRESSURE: 138 MMHG | BODY MASS INDEX: 44.31 KG/M2 | HEIGHT: 63 IN | WEIGHT: 250.1 LBS

## 2021-03-01 DIAGNOSIS — D72.829 LEUKOCYTOSIS, UNSPECIFIED TYPE: Primary | ICD-10-CM

## 2021-03-01 PROCEDURE — 99215 OFFICE O/P EST HI 40 MIN: CPT | Performed by: INTERNAL MEDICINE

## 2021-05-27 RX ORDER — FERROUS SULFATE 325(65) MG
TABLET ORAL
Qty: 90 TABLET | Refills: 0 | OUTPATIENT
Start: 2021-05-27

## 2021-06-29 ENCOUNTER — LAB (OUTPATIENT)
Dept: LAB | Facility: HOSPITAL | Age: 46
End: 2021-06-29

## 2021-06-29 DIAGNOSIS — D72.829 LEUKOCYTOSIS, UNSPECIFIED TYPE: ICD-10-CM

## 2021-06-29 LAB
BASOPHILS # BLD AUTO: 0.06 10*3/MM3 (ref 0–0.2)
BASOPHILS NFR BLD AUTO: 0.6 % (ref 0–1.5)
DEPRECATED RDW RBC AUTO: 40.1 FL (ref 37–54)
EOSINOPHIL # BLD AUTO: 0.24 10*3/MM3 (ref 0–0.4)
EOSINOPHIL NFR BLD AUTO: 2.2 % (ref 0.3–6.2)
ERYTHROCYTE [DISTWIDTH] IN BLOOD BY AUTOMATED COUNT: 13 % (ref 12.3–15.4)
FERRITIN SERPL-MCNC: 103.1 NG/ML (ref 13–150)
HCT VFR BLD AUTO: 39 % (ref 34–46.6)
HGB BLD-MCNC: 13 G/DL (ref 12–15.9)
IMM GRANULOCYTES # BLD AUTO: 0.06 10*3/MM3 (ref 0–0.05)
IMM GRANULOCYTES NFR BLD AUTO: 0.6 % (ref 0–0.5)
IRON 24H UR-MRATE: 78 MCG/DL (ref 37–145)
IRON SATN MFR SERPL: 20 % (ref 20–50)
LYMPHOCYTES # BLD AUTO: 3.45 10*3/MM3 (ref 0.7–3.1)
LYMPHOCYTES NFR BLD AUTO: 31.8 % (ref 19.6–45.3)
MCH RBC QN AUTO: 28.8 PG (ref 26.6–33)
MCHC RBC AUTO-ENTMCNC: 33.3 G/DL (ref 31.5–35.7)
MCV RBC AUTO: 86.3 FL (ref 79–97)
MONOCYTES # BLD AUTO: 0.52 10*3/MM3 (ref 0.1–0.9)
MONOCYTES NFR BLD AUTO: 4.8 % (ref 5–12)
NEUTROPHILS NFR BLD AUTO: 6.51 10*3/MM3 (ref 1.7–7)
NEUTROPHILS NFR BLD AUTO: 60 % (ref 42.7–76)
NRBC BLD AUTO-RTO: 0 /100 WBC (ref 0–0.2)
PLATELET # BLD AUTO: 248 10*3/MM3 (ref 140–450)
PMV BLD AUTO: 9.3 FL (ref 6–12)
RBC # BLD AUTO: 4.52 10*6/MM3 (ref 3.77–5.28)
TIBC SERPL-MCNC: 384 MCG/DL (ref 298–536)
TRANSFERRIN SERPL-MCNC: 258 MG/DL (ref 200–360)
WBC # BLD AUTO: 10.84 10*3/MM3 (ref 3.4–10.8)

## 2021-06-29 PROCEDURE — 82728 ASSAY OF FERRITIN: CPT

## 2021-06-29 PROCEDURE — 36415 COLL VENOUS BLD VENIPUNCTURE: CPT

## 2021-06-29 PROCEDURE — 85025 COMPLETE CBC W/AUTO DIFF WBC: CPT

## 2021-06-29 PROCEDURE — 84466 ASSAY OF TRANSFERRIN: CPT

## 2021-06-29 PROCEDURE — 83540 ASSAY OF IRON: CPT

## 2021-06-30 RX ORDER — FERROUS SULFATE 325(65) MG
TABLET ORAL
Qty: 90 TABLET | Refills: 0 | Status: SHIPPED | OUTPATIENT
Start: 2021-06-30 | End: 2021-07-06 | Stop reason: ALTCHOICE

## 2021-07-06 ENCOUNTER — OFFICE VISIT (OUTPATIENT)
Dept: ONCOLOGY | Facility: CLINIC | Age: 46
End: 2021-07-06

## 2021-07-06 VITALS
TEMPERATURE: 97.8 F | OXYGEN SATURATION: 98 % | DIASTOLIC BLOOD PRESSURE: 78 MMHG | BODY MASS INDEX: 43.87 KG/M2 | RESPIRATION RATE: 16 BRPM | HEIGHT: 63 IN | SYSTOLIC BLOOD PRESSURE: 134 MMHG | HEART RATE: 72 BPM | WEIGHT: 247.6 LBS

## 2021-07-06 DIAGNOSIS — E61.1 IRON DEFICIENCY: Primary | ICD-10-CM

## 2021-07-06 PROCEDURE — 99214 OFFICE O/P EST MOD 30 MIN: CPT | Performed by: INTERNAL MEDICINE

## 2021-07-06 RX ORDER — PANTOPRAZOLE SODIUM 40 MG/1
40 TABLET, DELAYED RELEASE ORAL DAILY
Qty: 30 TABLET | Refills: 0 | Status: CANCELLED | OUTPATIENT
Start: 2021-07-06

## 2021-07-06 RX ORDER — PANTOPRAZOLE SODIUM 40 MG/1
TABLET, DELAYED RELEASE ORAL
COMMUNITY
Start: 2021-06-30 | End: 2021-07-06

## 2021-07-23 ENCOUNTER — OFFICE VISIT (OUTPATIENT)
Dept: CARDIOLOGY | Facility: CLINIC | Age: 46
End: 2021-07-23

## 2021-07-23 VITALS
DIASTOLIC BLOOD PRESSURE: 94 MMHG | OXYGEN SATURATION: 99 % | SYSTOLIC BLOOD PRESSURE: 130 MMHG | HEIGHT: 63 IN | BODY MASS INDEX: 43.23 KG/M2 | WEIGHT: 244 LBS | HEART RATE: 78 BPM

## 2021-07-23 DIAGNOSIS — R00.2 PALPITATIONS: Primary | ICD-10-CM

## 2021-07-23 PROCEDURE — 93000 ELECTROCARDIOGRAM COMPLETE: CPT | Performed by: EMERGENCY MEDICINE

## 2021-07-23 PROCEDURE — 99204 OFFICE O/P NEW MOD 45 MIN: CPT | Performed by: EMERGENCY MEDICINE

## 2021-07-23 RX ORDER — METOPROLOL SUCCINATE 50 MG/1
50 TABLET, EXTENDED RELEASE ORAL DAILY
Qty: 30 TABLET | Refills: 5 | Status: SHIPPED | OUTPATIENT
Start: 2021-07-23 | End: 2021-10-08

## 2021-07-24 NOTE — PROGRESS NOTES
[facility] - CARDIOLOGY  New Patient Initial Outpatient Evaulation    Primary Care Physician: Antoine Hernandez DO    Subjective     Chief Complaint   Patient presents with   • Palpitations     NEW PT         History of Present Illness     Patient is a very pleasant 46-year-old female with a past medical history significant for hypertension, tachycardia, obesity, and iron deficiency anemia who presents to the cardiology clinic today for initial evaluation.  Patient states that when she was in her early 20s she had episodes where she passed out for which she saw Dr. Tejada.  She was diagnosed with supraventricular tachycardia.  She moved away from the area and never had follow-up.  She is back in the [facility] now and has started having similar symptoms to 20 years ago.  She describes it as her heart is running backwards after it stops for a couple of seconds and restarts.  She also gets significantly short of breath when these episodes occur.  She states that her father had similar symptoms as well.      Review of Systems   Constitutional: Negative for diaphoresis, fever and malaise/fatigue.   HENT: Negative for congestion.    Eyes: Negative for vision loss in left eye and vision loss in right eye.   Cardiovascular: Positive for irregular heartbeat and palpitations. Negative for chest pain, claudication, dyspnea on exertion, leg swelling, orthopnea and syncope.   Respiratory: Positive for shortness of breath. Negative for cough and wheezing.    Hematologic/Lymphatic: Negative for adenopathy.   Skin: Negative for rash.   Musculoskeletal: Negative for joint pain and joint swelling.   Gastrointestinal: Negative for abdominal pain, diarrhea, nausea and vomiting.   Neurological: Negative for excessive daytime sleepiness, dizziness, focal weakness, light-headedness, numbness and weakness.   Psychiatric/Behavioral: Negative for depression. The patient does not have insomnia.         Otherwise complete ROS reviewed  and negative except as mentioned in the HPI.      Past Medical History:   Past Medical History:   Diagnosis Date   • Abnormal Pap smear of cervix    • Anemia    • Asthma    • Cervical dysplasia    • Hypertension    • IBS (irritable bowel syndrome)    • IBS (irritable bowel syndrome)    • IBS (irritable bowel syndrome)    • In vitro fertilization    • Infertility, female    • Kidney stones    • Kidney stones    • Leukocytosis    • LGSIL (low grade squamous intraepithelial dysplasia)    • Polycystic ovarian disease    • PONV (postoperative nausea and vomiting)    • Spontaneous      x2   • Tachycardia        Past Surgical History:  Past Surgical History:   Procedure Laterality Date   • BONE MARROW BIOPSY     • CHOLECYSTECTOMY     • COLONOSCOPY     • CYSTOSCOPY N/A 2016    Procedure: CYSTOSCOPY;  Surgeon: Jonathan Figueroa MD;  Location: [facility] OR;  Service:    • CYSTOSCOPY ELECTROHYDRAULIC LITHOTRIPSY     • EYE SURGERY      LASIK   • HYSTEROSCOPY ENDOMETRIAL ABLATION      X2   • KIDNEY STONE SURGERY      removed   • SALPINGECTOMY Bilateral    • SKIN BIOPSY     • TOTAL LAPAROSCOPIC HYSTERECTOMY N/A 2016    Procedure: TOTAL LAPAROSCOPIC HYSTERECTOMY WITH DAVINCI SI ROBOT WITH CYSTO;  Surgeon: Jonathan Figueroa MD;  Location: [facility] OR;  Service:        Family History: family history includes Anesthesia problems in her maternal grandmother and mother; Arrhythmia in her father; Colon cancer in her paternal grandmother; Heart attack in her paternal grandfather; Hypertension in her father and mother; Liver cancer in her paternal grandfather; Pancreatic cancer in her paternal grandmother.    Social History:  reports that she has never smoked. She has never used smokeless tobacco. She reports that she does not drink alcohol and does not use drugs.    Medications:  Prior to Admission medications    Medication Sig Start Date End Date Taking? Authorizing Provider   albuterol (PROVENTIL HFA;VENTOLIN HFA) 108 (90  "Base) MCG/ACT inhaler Inhale 2 puffs Every 4 (Four) Hours As Needed for Wheezing.   Yes James Baumann MD   busPIRone (BUSPAR) 10 MG tablet Take 10 mg by mouth As Needed.   Yes James Baumann MD   Calcium Acetate, Phos Binder, (CALCIUM ACETATE PO) Take  by mouth.   Yes James Baumann MD   cholecalciferol (VITAMIN D3) 1000 units tablet Take 1,000 Units by mouth Daily.   Yes James Baumann MD   colesevelam (WELCHOL) 625 MG tablet Take 1,875 mg by mouth as needed.   Yes James Baumann MD   dexlansoprazole (DEXILANT) 60 MG capsule Take 60 mg by mouth Daily.   Yes James Baumann MD   dicyclomine (BENTYL) 20 MG tablet Take 40 mg by mouth every night. 8/2/16  Yes James Baumann MD   levocetirizine (XYZAL) 5 MG tablet  4/8/20  Yes James Baumann MD   sucralfate (CARAFATE) 1 G tablet Take 1 g by mouth as needed.   Yes James Baumann MD   tamsulosin (FLOMAX) 0.4 MG capsule 24 hr capsule  8/27/20  Yes ProviderJames MD   metoprolol succinate XL (Toprol XL) 50 MG 24 hr tablet Take 1 tablet by mouth Daily. 7/23/21   Gabriel Hadley,      Allergies:  Allergies   Allergen Reactions   • Duexis [Ibuprofen-Famotidine] Anaphylaxis     Buffer in medication is what allergic to   • Famotidine Anaphylaxis   • Other Swelling     AVOCADO- EAR AND THROAT SWELLING   • Sulfa Antibiotics Anaphylaxis   • Fish-Derived Products Hives and Nausea And Vomiting   • Avocado Hives and Swelling   • Procardia [Nifedipine] Swelling and Rash       Objective     Vital Signs: /94   Pulse 78   Ht 160 cm (62.99\")   Wt 111 kg (244 lb)   LMP 09/13/2016 Comment: negative hcg noted to chart  SpO2 99%   BMI 43.23 kg/m²     Vitals and nursing note reviewed.   Constitutional:       Appearance: Normal and healthy appearance. Well-developed and not in distress.   Eyes:      Extraocular Movements: Extraocular movements intact.      Pupils: Pupils are equal, round, and reactive " to light.   HENT:      Head: Normocephalic and atraumatic.    Mouth/Throat:      Pharynx: Oropharynx is clear.   Neck:      Vascular: JVD normal.      Trachea: Trachea normal.   Pulmonary:      Effort: Pulmonary effort is normal.      Breath sounds: Normal breath sounds. No wheezing. No rhonchi. No rales.   Cardiovascular:      PMI at left midclavicular line. Normal rate. Regular rhythm. Normal S1. Normal S2.      Murmurs: There is a grade 2/6 systolic murmur.      No gallop. No click. No rub.   Pulses:     Dorsalis pedis: 2+ bilaterally.     Posterior tibial: 2+ bilaterally.  Abdominal:      General: Bowel sounds are normal.      Palpations: Abdomen is soft.      Tenderness: There is no abdominal tenderness.   Musculoskeletal: Normal range of motion.      Cervical back: Normal range of motion and neck supple. Skin:     General: Skin is warm and dry.      Capillary Refill: Capillary refill takes less than 2 seconds.   Feet:      Right foot:      Skin integrity: Skin integrity normal.      Left foot:      Skin integrity: Skin integrity normal.   Neurological:      Mental Status: Alert and oriented to person, place and time.      Cranial Nerves: Cranial nerves are intact.      Sensory: Sensation is intact.      Motor: Motor function is intact.      Coordination: Coordination is intact.   Psychiatric:         Speech: Speech normal.         Behavior: Behavior is cooperative.         Results Reviewed:      ECG 12 Lead    Date/Time: 7/24/2021 2:56 PM  Performed by: Gabriel Hadley DO  Authorized by: Gabriel Hadley DO   Comparison: compared with previous ECG   Similar to previous ECG  Rhythm: sinus rhythm  Rate: normal  Conduction: conduction normal  ST Segments: ST segments normal  T Waves: T waves normal  QRS axis: normal  Other: no other findings    Clinical impression: normal ECG              Lab Results   Component Value Date    TRIG 229 (H) 10/16/2018    HDL 44 (L) 10/16/2018    VLDL 45.8  10/16/2018    LDLHDL 2.46 10/16/2018     Lab Results   Component Value Date    HGBA1C 5.60 10/16/2018       Assessment / Plan        Problem List Items Addressed This Visit     None      Visit Diagnoses     Palpitations    -  Primary    Relevant Orders    Adult Transthoracic Echo Complete W/ Cont if Necessary Per Protocol        Plan:    Patient with significant palpitations and associated shortness of breath on exertion.  We will start her on beta-blocker therapy with Toprol-XL 50 mg daily.  We will also get a transthoracic echocardiogram to rule out any structural heart disease or valve disease which could be contributing to her symptoms.  If her symptoms do not completely resolve with the beta-blocker, we will plan on placing a monitor on patient at next visit to confirm that it is still the supraventricular tachycardia that is causing her symptoms.    Follow-up in 1 month    Thank you Dr. Hernandez for this referral.  Please call me with any questions at any time.  My personal cell phone number is [phone].        Gabriel Hadley, DO   Interventional cardiology  [facility]  07/24/21   14:54 CDT

## 2021-09-02 RX ORDER — FERROUS SULFATE 325(65) MG
TABLET ORAL
Qty: 90 TABLET | Refills: 0 | OUTPATIENT
Start: 2021-09-02

## 2021-09-08 ENCOUNTER — HOSPITAL ENCOUNTER (OUTPATIENT)
Dept: CARDIOLOGY | Facility: HOSPITAL | Age: 46
Discharge: HOME | End: 2021-09-08
Admitting: EMERGENCY MEDICINE

## 2021-09-08 VITALS
DIASTOLIC BLOOD PRESSURE: 83 MMHG | HEIGHT: 63 IN | WEIGHT: 244 LBS | BODY MASS INDEX: 43.23 KG/M2 | SYSTOLIC BLOOD PRESSURE: 149 MMHG

## 2021-09-08 DIAGNOSIS — R00.2 PALPITATIONS: ICD-10-CM

## 2021-09-08 PROCEDURE — 93306 TTE W/DOPPLER COMPLETE: CPT

## 2021-09-08 PROCEDURE — 93306 TTE W/DOPPLER COMPLETE: CPT | Performed by: EMERGENCY MEDICINE

## 2021-09-09 ENCOUNTER — TRANSCRIBE ORDERS (OUTPATIENT)
Dept: ADMINISTRATIVE | Facility: HOSPITAL | Age: 46
End: 2021-09-09

## 2021-09-09 DIAGNOSIS — N20.0 RENAL CALCULUS, BILATERAL: Primary | ICD-10-CM

## 2021-09-09 DIAGNOSIS — Z12.31 ENCOUNTER FOR SCREENING MAMMOGRAM FOR MALIGNANT NEOPLASM OF BREAST: Primary | ICD-10-CM

## 2021-09-09 LAB
BH CV ECHO MEAS - AO MAX PG (FULL): 4.4 MMHG
BH CV ECHO MEAS - AO MAX PG: 9.6 MMHG
BH CV ECHO MEAS - AO MEAN PG (FULL): 3 MMHG
BH CV ECHO MEAS - AO MEAN PG: 5 MMHG
BH CV ECHO MEAS - AO ROOT AREA (BSA CORRECTED): 1.2
BH CV ECHO MEAS - AO ROOT AREA: 4.9 CM^2
BH CV ECHO MEAS - AO ROOT DIAM: 2.5 CM
BH CV ECHO MEAS - AO V2 MAX: 155 CM/SEC
BH CV ECHO MEAS - AO V2 MEAN: 106 CM/SEC
BH CV ECHO MEAS - AO V2 VTI: 33.2 CM
BH CV ECHO MEAS - AVA(I,A): 2.3 CM^2
BH CV ECHO MEAS - AVA(I,D): 2.3 CM^2
BH CV ECHO MEAS - AVA(V,A): 2.3 CM^2
BH CV ECHO MEAS - AVA(V,D): 2.3 CM^2
BH CV ECHO MEAS - BSA(HAYCOCK): 2.3 M^2
BH CV ECHO MEAS - BSA: 2.1 M^2
BH CV ECHO MEAS - BZI_BMI: 43.2 KILOGRAMS/M^2
BH CV ECHO MEAS - BZI_METRIC_HEIGHT: 160 CM
BH CV ECHO MEAS - BZI_METRIC_WEIGHT: 110.7 KG
BH CV ECHO MEAS - EDV(CUBED): 81.2 ML
BH CV ECHO MEAS - EDV(MOD-SP4): 81.9 ML
BH CV ECHO MEAS - EDV(TEICH): 84.4 ML
BH CV ECHO MEAS - EF(CUBED): 79.8 %
BH CV ECHO MEAS - EF(MOD-SP4): 66.7 %
BH CV ECHO MEAS - EF(TEICH): 72.5 %
BH CV ECHO MEAS - ESV(CUBED): 16.4 ML
BH CV ECHO MEAS - ESV(MOD-SP4): 27.3 ML
BH CV ECHO MEAS - ESV(TEICH): 23.2 ML
BH CV ECHO MEAS - FS: 41.3 %
BH CV ECHO MEAS - IVS/LVPW: 1
BH CV ECHO MEAS - IVSD: 0.91 CM
BH CV ECHO MEAS - LA DIMENSION: 3.5 CM
BH CV ECHO MEAS - LA/AO: 1.4
BH CV ECHO MEAS - LAT PEAK E' VEL: 12.7 CM/SEC
BH CV ECHO MEAS - LV DIASTOLIC VOL/BSA (35-75): 38.9 ML/M^2
BH CV ECHO MEAS - LV MASS(C)D: 125.2 GRAMS
BH CV ECHO MEAS - LV MASS(C)DI: 59.5 GRAMS/M^2
BH CV ECHO MEAS - LV MAX PG: 5.2 MMHG
BH CV ECHO MEAS - LV MEAN PG: 2 MMHG
BH CV ECHO MEAS - LV SYSTOLIC VOL/BSA (12-30): 13 ML/M^2
BH CV ECHO MEAS - LV V1 MAX: 114 CM/SEC
BH CV ECHO MEAS - LV V1 MEAN: 71 CM/SEC
BH CV ECHO MEAS - LV V1 VTI: 24.6 CM
BH CV ECHO MEAS - LVIDD: 4.3 CM
BH CV ECHO MEAS - LVIDS: 2.5 CM
BH CV ECHO MEAS - LVLD AP4: 7.7 CM
BH CV ECHO MEAS - LVLS AP4: 6.2 CM
BH CV ECHO MEAS - LVOT AREA (M): 3.1 CM^2
BH CV ECHO MEAS - LVOT AREA: 3.1 CM^2
BH CV ECHO MEAS - LVOT DIAM: 2 CM
BH CV ECHO MEAS - LVPWD: 0.9 CM
BH CV ECHO MEAS - MED PEAK E' VEL: 8.05 CM/SEC
BH CV ECHO MEAS - MV A MAX VEL: 55.8 CM/SEC
BH CV ECHO MEAS - MV DEC TIME: 0.16 SEC
BH CV ECHO MEAS - MV E MAX VEL: 89.2 CM/SEC
BH CV ECHO MEAS - MV E/A: 1.6
BH CV ECHO MEAS - SI(AO): 77.4 ML/M^2
BH CV ECHO MEAS - SI(CUBED): 30.8 ML/M^2
BH CV ECHO MEAS - SI(LVOT): 36.7 ML/M^2
BH CV ECHO MEAS - SI(MOD-SP4): 25.9 ML/M^2
BH CV ECHO MEAS - SI(TEICH): 29.1 ML/M^2
BH CV ECHO MEAS - SV(AO): 163 ML
BH CV ECHO MEAS - SV(CUBED): 64.8 ML
BH CV ECHO MEAS - SV(LVOT): 77.3 ML
BH CV ECHO MEAS - SV(MOD-SP4): 54.6 ML
BH CV ECHO MEAS - SV(TEICH): 61.2 ML
BH CV ECHO MEASUREMENTS AVERAGE E/E' RATIO: 8.6
LEFT ATRIUM VOLUME INDEX: 22 ML/M2
LEFT ATRIUM VOLUME: 46.2 CM3

## 2021-09-10 ENCOUNTER — OFFICE VISIT (OUTPATIENT)
Dept: CARDIOLOGY | Facility: CLINIC | Age: 46
End: 2021-09-10

## 2021-09-10 VITALS
OXYGEN SATURATION: 99 % | DIASTOLIC BLOOD PRESSURE: 90 MMHG | HEART RATE: 82 BPM | HEIGHT: 63 IN | SYSTOLIC BLOOD PRESSURE: 120 MMHG | BODY MASS INDEX: 44.65 KG/M2 | WEIGHT: 252 LBS

## 2021-09-10 DIAGNOSIS — I10 ESSENTIAL HYPERTENSION: ICD-10-CM

## 2021-09-10 DIAGNOSIS — E66.01 MORBID OBESITY WITH BMI OF 40.0-44.9, ADULT (HCC): ICD-10-CM

## 2021-09-10 DIAGNOSIS — R00.2 PALPITATIONS: Primary | ICD-10-CM

## 2021-09-10 PROCEDURE — 99213 OFFICE O/P EST LOW 20 MIN: CPT | Performed by: EMERGENCY MEDICINE

## 2021-09-10 RX ORDER — PROPRANOLOL HYDROCHLORIDE 80 MG/1
80 TABLET ORAL 3 TIMES DAILY
COMMUNITY
End: 2021-10-08 | Stop reason: SDUPTHER

## 2021-09-11 NOTE — PROGRESS NOTES
Subjective:     Encounter Date:09/10/2021      Patient ID: Breonna Bellamy is a 46 y.o. female.    Chief Complaint:  History of Present Illness    46-year-old female who is here for a 1 month follow-up today.  She has a history of hypertension, supraventricular tachycardia, obesity who we saw last time for palpitations with associated shortness of breath.  We started her on Toprol-XL 50 mg daily and obtain a transthoracic echocardiogram.  The echo showed normal systolic and diastolic function with mild aortic valve regurgitation with no evidence of pulmonary hypertension.    Today, patient states the Toprol did not work very well.  She believes that it actually made her heart rate increased into the 150s and 160s.  She is still having the palpitations.    Review of Systems   Constitutional: Negative for diaphoresis, fever and malaise/fatigue.   HENT: Negative for congestion.    Eyes: Negative for vision loss in left eye and vision loss in right eye.   Cardiovascular: Positive for irregular heartbeat and palpitations. Negative for chest pain, claudication, dyspnea on exertion, leg swelling, orthopnea and syncope.   Respiratory: Negative for cough, shortness of breath and wheezing.    Hematologic/Lymphatic: Negative for adenopathy.   Skin: Negative for rash.   Musculoskeletal: Negative for joint pain and joint swelling.   Gastrointestinal: Negative for abdominal pain, diarrhea, nausea and vomiting.   Neurological: Negative for excessive daytime sleepiness, dizziness, focal weakness, light-headedness, numbness and weakness.   Psychiatric/Behavioral: Negative for depression. The patient does not have insomnia.            Current Outpatient Medications:   •  albuterol (PROVENTIL HFA;VENTOLIN HFA) 108 (90 Base) MCG/ACT inhaler, Inhale 2 puffs Every 4 (Four) Hours As Needed for Wheezing., Disp: , Rfl:   •  busPIRone (BUSPAR) 10 MG tablet, Take 10 mg by mouth As Needed., Disp: , Rfl:   •  Calcium Acetate, Phos Binder,  (CALCIUM ACETATE PO), Take  by mouth., Disp: , Rfl:   •  cholecalciferol (VITAMIN D3) 1000 units tablet, Take 1,000 Units by mouth Daily., Disp: , Rfl:   •  colesevelam (WELCHOL) 625 MG tablet, Take 1,875 mg by mouth as needed., Disp: , Rfl:   •  dexlansoprazole (DEXILANT) 60 MG capsule, Take 60 mg by mouth Daily., Disp: , Rfl:   •  dicyclomine (BENTYL) 20 MG tablet, Take 40 mg by mouth every night., Disp: , Rfl:   •  levocetirizine (XYZAL) 5 MG tablet, , Disp: , Rfl:   •  propranolol (INDERAL) 80 MG tablet, Take 80 mg by mouth 3 (Three) Times a Day., Disp: , Rfl:   •  sucralfate (CARAFATE) 1 G tablet, Take 1 g by mouth as needed., Disp: , Rfl:   •  tamsulosin (FLOMAX) 0.4 MG capsule 24 hr capsule, , Disp: , Rfl:   •  metoprolol succinate XL (Toprol XL) 50 MG 24 hr tablet, Take 1 tablet by mouth Daily., Disp: 30 tablet, Rfl: 5       Objective:      Vitals:    09/10/21 0849   BP: 120/90   Pulse: 82   SpO2: 99%     Vitals and nursing note reviewed.   Constitutional:       Appearance: Normal and healthy appearance. Well-developed and not in distress.   Eyes:      Extraocular Movements: Extraocular movements intact.      Pupils: Pupils are equal, round, and reactive to light.   HENT:      Head: Normocephalic and atraumatic.    Mouth/Throat:      Pharynx: Oropharynx is clear.   Neck:      Vascular: JVD normal.      Trachea: Trachea normal.   Pulmonary:      Effort: Pulmonary effort is normal.      Breath sounds: Normal breath sounds. No wheezing. No rhonchi. No rales.   Cardiovascular:      PMI at left midclavicular line. Normal rate. Regular rhythm. Normal S1. Normal S2.      Murmurs: There is no murmur.      No gallop. No click. No rub.   Pulses:     Dorsalis pedis: 2+ bilaterally.     Posterior tibial: 2+ bilaterally.  Abdominal:      General: Bowel sounds are normal.      Palpations: Abdomen is soft.      Tenderness: There is no abdominal tenderness.   Musculoskeletal: Normal range of motion.      Cervical back:  Normal range of motion and neck supple. Skin:     General: Skin is warm and dry.      Capillary Refill: Capillary refill takes less than 2 seconds.   Feet:      Right foot:      Skin integrity: Skin integrity normal.      Left foot:      Skin integrity: Skin integrity normal.   Neurological:      Mental Status: Alert and oriented to person, place and time.      Cranial Nerves: Cranial nerves are intact.      Sensory: Sensation is intact.      Motor: Motor function is intact.      Coordination: Coordination is intact.   Psychiatric:         Speech: Speech normal.         Behavior: Behavior is cooperative.         Lab Review:             Assessment/Plan:     Problem List Items Addressed This Visit        Cardiac and Vasculature    HTN (hypertension)    Relevant Medications    propranolol (INDERAL) 80 MG tablet       Endocrine and Metabolic    Morbid obesity with BMI of 40.0-44.9, adult (CMS/[facility])      Other Visit Diagnoses     Palpitations    -  Primary    Relevant Orders    Holter Monitor - 72 Hour Up To 15 Days            Recommendations/plans:    DC the metoprolol.  We will place the patient on a cardiac monitor to see if we can capture what type of arrhythmia she is having when her heart rate gets up to the 150s and 160s.  Once the monitoring is complete, patient will start on Inderal 80 mg daily.    Follow-up with cardiology in 1 month    Gabriel Hadley DO   Interventional cardiology  [facility]  09/10/2021  10:29 CDT

## 2021-09-17 ENCOUNTER — HOSPITAL ENCOUNTER (OUTPATIENT)
Dept: MAMMOGRAPHY | Facility: HOSPITAL | Age: 46
Discharge: HOME | End: 2021-09-17
Admitting: OBSTETRICS & GYNECOLOGY

## 2021-09-17 ENCOUNTER — DOCUMENTATION (OUTPATIENT)
Dept: MAMMOGRAPHY | Facility: HOSPITAL | Age: 46
End: 2021-09-17

## 2021-09-17 DIAGNOSIS — Z12.31 ENCOUNTER FOR SCREENING MAMMOGRAM FOR MALIGNANT NEOPLASM OF BREAST: ICD-10-CM

## 2021-09-17 DIAGNOSIS — Z80.0 FAMILY HISTORY OF PANCREATIC CANCER: Primary | ICD-10-CM

## 2021-09-17 PROCEDURE — 77063 BREAST TOMOSYNTHESIS BI: CPT

## 2021-09-17 PROCEDURE — 77067 SCR MAMMO BI INCL CAD: CPT

## 2021-10-06 ENCOUNTER — TELEPHONE (OUTPATIENT)
Dept: UROLOGY | Facility: CLINIC | Age: 46
End: 2021-10-06

## 2021-10-08 ENCOUNTER — OFFICE VISIT (OUTPATIENT)
Dept: CARDIOLOGY | Facility: CLINIC | Age: 46
End: 2021-10-08

## 2021-10-08 VITALS
HEIGHT: 63 IN | BODY MASS INDEX: 44.65 KG/M2 | DIASTOLIC BLOOD PRESSURE: 82 MMHG | HEART RATE: 67 BPM | WEIGHT: 252 LBS | SYSTOLIC BLOOD PRESSURE: 140 MMHG | OXYGEN SATURATION: 99 %

## 2021-10-08 DIAGNOSIS — E66.01 MORBID OBESITY WITH BMI OF 40.0-44.9, ADULT (HCC): ICD-10-CM

## 2021-10-08 DIAGNOSIS — I10 PRIMARY HYPERTENSION: Primary | ICD-10-CM

## 2021-10-08 DIAGNOSIS — R00.2 PALPITATIONS: ICD-10-CM

## 2021-10-08 PROCEDURE — 99213 OFFICE O/P EST LOW 20 MIN: CPT | Performed by: EMERGENCY MEDICINE

## 2021-10-08 RX ORDER — DILTIAZEM HYDROCHLORIDE 120 MG/1
120 CAPSULE, COATED, EXTENDED RELEASE ORAL DAILY
Qty: 30 CAPSULE | Refills: 11 | Status: SHIPPED | OUTPATIENT
Start: 2021-10-08 | End: 2022-04-15

## 2021-10-08 RX ORDER — PROPRANOLOL HYDROCHLORIDE 80 MG/1
80 TABLET ORAL 3 TIMES DAILY
Qty: 270 TABLET | Refills: 3 | Status: SHIPPED | OUTPATIENT
Start: 2021-10-08 | End: 2022-04-15 | Stop reason: SDUPTHER

## 2021-10-10 PROBLEM — R00.2 PALPITATIONS: Status: ACTIVE | Noted: 2021-10-10

## 2021-10-10 NOTE — PROGRESS NOTES
Subjective:     Encounter Date:10/08/2021      Patient ID: Breonna Bellamy is a 46 y.o. female.    Chief Complaint:  History of Present Illness    46-year-old female who is in 1 month follow-up today for palpitations.  At last appointment, we changed her beta-blocker from propranolol to metoprolol.  She says that her symptoms have actually worsened since this change.  We also placed her on a heart monitor.  She had some rare PACs and rare PVCs but no significant arrhythmias.  She is back on the propranolol and still having the same symptoms as before including the palpitations.    Review of Systems   Constitutional: Negative for diaphoresis, fever and malaise/fatigue.   HENT: Negative for congestion.    Eyes: Negative for vision loss in left eye and vision loss in right eye.   Cardiovascular: Positive for palpitations. Negative for chest pain, claudication, dyspnea on exertion, irregular heartbeat, leg swelling, orthopnea and syncope.   Respiratory: Negative for cough, shortness of breath and wheezing.    Hematologic/Lymphatic: Negative for adenopathy.   Skin: Negative for rash.   Musculoskeletal: Negative for joint pain and joint swelling.   Gastrointestinal: Negative for abdominal pain, diarrhea, nausea and vomiting.   Neurological: Negative for excessive daytime sleepiness, dizziness, focal weakness, light-headedness, numbness and weakness.   Psychiatric/Behavioral: Negative for depression. The patient does not have insomnia.            Current Outpatient Medications:   •  albuterol (PROVENTIL HFA;VENTOLIN HFA) 108 (90 Base) MCG/ACT inhaler, Inhale 2 puffs Every 4 (Four) Hours As Needed for Wheezing., Disp: , Rfl:   •  busPIRone (BUSPAR) 10 MG tablet, Take 10 mg by mouth As Needed., Disp: , Rfl:   •  Calcium Acetate, Phos Binder, (CALCIUM ACETATE PO), Take  by mouth., Disp: , Rfl:   •  cholecalciferol (VITAMIN D3) 1000 units tablet, Take 1,000 Units by mouth Daily., Disp: , Rfl:   •  colesevelam (WELCHOL) 625  MG tablet, Take 1,875 mg by mouth as needed., Disp: , Rfl:   •  dexlansoprazole (DEXILANT) 60 MG capsule, Take 60 mg by mouth Daily., Disp: , Rfl:   •  dicyclomine (BENTYL) 20 MG tablet, Take 40 mg by mouth every night., Disp: , Rfl:   •  levocetirizine (XYZAL) 5 MG tablet, , Disp: , Rfl:   •  propranolol (INDERAL) 80 MG tablet, Take 1 tablet by mouth 3 (Three) Times a Day., Disp: 270 tablet, Rfl: 3  •  sucralfate (CARAFATE) 1 G tablet, Take 1 g by mouth as needed., Disp: , Rfl:   •  tamsulosin (FLOMAX) 0.4 MG capsule 24 hr capsule, , Disp: , Rfl:   •  dilTIAZem CD (Cardizem CD) 120 MG 24 hr capsule, Take 1 capsule by mouth Daily., Disp: 30 capsule, Rfl: 11       Objective:      Vitals:    10/08/21 0904   BP: 140/82   Pulse: 67   SpO2: 99%     Vitals and nursing note reviewed.   Constitutional:       Appearance: Normal and healthy appearance. Well-developed and not in distress.   Eyes:      Extraocular Movements: Extraocular movements intact.      Pupils: Pupils are equal, round, and reactive to light.   HENT:      Head: Normocephalic and atraumatic.    Mouth/Throat:      Pharynx: Oropharynx is clear.   Neck:      Vascular: JVD normal.      Trachea: Trachea normal.   Pulmonary:      Effort: Pulmonary effort is normal.      Breath sounds: Normal breath sounds. No wheezing. No rhonchi. No rales.   Cardiovascular:      PMI at left midclavicular line. Normal rate. Regular rhythm. Normal S1. Normal S2.      Murmurs: There is no murmur.      No gallop. No click. No rub.   Pulses:     Dorsalis pedis: 2+ bilaterally.     Posterior tibial: 2+ bilaterally.  Abdominal:      General: Bowel sounds are normal.      Palpations: Abdomen is soft.      Tenderness: There is no abdominal tenderness.   Musculoskeletal: Normal range of motion.      Cervical back: Normal range of motion and neck supple. Skin:     General: Skin is warm and dry.      Capillary Refill: Capillary refill takes less than 2 seconds.   Feet:      Right foot:       Skin integrity: Skin integrity normal.      Left foot:      Skin integrity: Skin integrity normal.   Neurological:      Mental Status: Alert and oriented to person, place and time.      Cranial Nerves: Cranial nerves are intact.      Sensory: Sensation is intact.      Motor: Motor function is intact.      Coordination: Coordination is intact.   Psychiatric:         Speech: Speech normal.         Behavior: Behavior is cooperative.         Lab Review:       Procedures      Assessment/Plan:     Problem List Items Addressed This Visit        Cardiac and Vasculature    HTN (hypertension) - Primary    Relevant Medications    dilTIAZem CD (Cardizem CD) 120 MG 24 hr capsule    propranolol (INDERAL) 80 MG tablet    Palpitations       Endocrine and Metabolic    Morbid obesity with BMI of 40.0-44.9, adult (HCC)            Recommendations/plans:    Recommend patient continue on the current dose of the propranolol.  We will also add Cardizem 120 to her regimen to see if we can get her complete symptomatic relief.    Follow-up with cardiology in 6 months      Gabriel Hadley DO   Interventional cardiology  [facility]  10/08/2021  15:35 CDT

## 2021-10-12 ENCOUNTER — HOSPITAL ENCOUNTER (OUTPATIENT)
Dept: GENERAL RADIOLOGY | Facility: HOSPITAL | Age: 46
Discharge: HOME | End: 2021-10-12
Admitting: UROLOGY

## 2021-10-12 ENCOUNTER — OFFICE VISIT (OUTPATIENT)
Dept: UROLOGY | Facility: CLINIC | Age: 46
End: 2021-10-12

## 2021-10-12 VITALS — HEIGHT: 63 IN | TEMPERATURE: 97.5 F | WEIGHT: 248.5 LBS | BODY MASS INDEX: 44.03 KG/M2

## 2021-10-12 DIAGNOSIS — N20.0 RENAL CALCULUS, BILATERAL: ICD-10-CM

## 2021-10-12 DIAGNOSIS — N20.0 RENAL CALCULUS, LEFT: Primary | ICD-10-CM

## 2021-10-12 LAB
BILIRUB BLD-MCNC: NEGATIVE MG/DL
CLARITY, POC: CLEAR
COLOR UR: YELLOW
GLUCOSE UR STRIP-MCNC: NEGATIVE MG/DL
KETONES UR QL: NEGATIVE
LEUKOCYTE EST, POC: NEGATIVE
NITRITE UR-MCNC: NEGATIVE MG/ML
PH UR: 5.5 [PH] (ref 5–8)
PROT UR STRIP-MCNC: NEGATIVE MG/DL
RBC # UR STRIP: NEGATIVE /UL
SP GR UR: 1.01 (ref 1–1.03)
UROBILINOGEN UR QL: NORMAL

## 2021-10-12 PROCEDURE — 81003 URINALYSIS AUTO W/O SCOPE: CPT | Performed by: UROLOGY

## 2021-10-12 PROCEDURE — 74018 RADEX ABDOMEN 1 VIEW: CPT

## 2021-10-12 PROCEDURE — 99213 OFFICE O/P EST LOW 20 MIN: CPT | Performed by: UROLOGY

## 2021-10-12 RX ORDER — MELOXICAM 15 MG/1
TABLET ORAL
COMMUNITY
Start: 2021-10-01 | End: 2022-04-15

## 2021-10-12 NOTE — PROGRESS NOTES
Chief Complaint  Follow-up kidney stone    Subjective          Breonna Bellamy presents to [facility] UROLOGY for:  History of Present Illness  Recurrent stone former.  Presents today for 1 year follow-up.  Since last visit she has passed the ureteral calculus. No blood in urine        Current Outpatient Medications:   •  albuterol (PROVENTIL HFA;VENTOLIN HFA) 108 (90 Base) MCG/ACT inhaler, Inhale 2 puffs Every 4 (Four) Hours As Needed for Wheezing., Disp: , Rfl:   •  busPIRone (BUSPAR) 10 MG tablet, Take 10 mg by mouth As Needed., Disp: , Rfl:   •  Calcium Acetate, Phos Binder, (CALCIUM ACETATE PO), Take  by mouth., Disp: , Rfl:   •  cholecalciferol (VITAMIN D3) 1000 units tablet, Take 1,000 Units by mouth Daily., Disp: , Rfl:   •  dexlansoprazole (DEXILANT) 60 MG capsule, Take 60 mg by mouth Daily., Disp: , Rfl:   •  dicyclomine (BENTYL) 20 MG tablet, Take 40 mg by mouth every night., Disp: , Rfl:   •  dilTIAZem CD (Cardizem CD) 120 MG 24 hr capsule, Take 1 capsule by mouth Daily., Disp: 30 capsule, Rfl: 11  •  levocetirizine (XYZAL) 5 MG tablet, , Disp: , Rfl:   •  meloxicam (MOBIC) 15 MG tablet, , Disp: , Rfl:   •  propranolol (INDERAL) 80 MG tablet, Take 1 tablet by mouth 3 (Three) Times a Day., Disp: 270 tablet, Rfl: 3  •  sucralfate (CARAFATE) 1 G tablet, Take 1 g by mouth as needed., Disp: , Rfl:   •  tamsulosin (FLOMAX) 0.4 MG capsule 24 hr capsule, , Disp: , Rfl:   •  colesevelam (WELCHOL) 625 MG tablet, Take 1,875 mg by mouth as needed., Disp: , Rfl:   Past Medical History:   Diagnosis Date   • Abnormal Pap smear of cervix    • Anemia    • Asthma    • Cervical dysplasia    • Hypertension    • IBS (irritable bowel syndrome)    • IBS (irritable bowel syndrome)    • IBS (irritable bowel syndrome)    • In vitro fertilization    • Infertility, female    • Kidney stones    • Kidney stones    • Leukocytosis    • LGSIL (low grade squamous intraepithelial dysplasia)    • Polycystic ovarian disease   "  • PONV (postoperative nausea and vomiting)    • Spontaneous      x2   • Tachycardia      Past Surgical History:   Procedure Laterality Date   • BONE MARROW BIOPSY     • CHOLECYSTECTOMY     • COLONOSCOPY     • CYSTOSCOPY N/A 2016    Procedure: CYSTOSCOPY;  Surgeon: Jonathan Figueroa MD;  Location: [facility] OR;  Service:    • CYSTOSCOPY ELECTROHYDRAULIC LITHOTRIPSY     • EYE SURGERY      LASIK   • HYSTEROSCOPY ENDOMETRIAL ABLATION      X2   • KIDNEY STONE SURGERY      removed   • SALPINGECTOMY Bilateral    • SKIN BIOPSY     • TOTAL LAPAROSCOPIC HYSTERECTOMY N/A 2016    Procedure: TOTAL LAPAROSCOPIC HYSTERECTOMY WITH DAVINCI SI ROBOT WITH CYSTO;  Surgeon: Jonathan Figueroa MD;  Location: [facility] OR;  Service:          Review  of systems  Constitutional: Negative for chills and fever.   Gastrointestinal: Negative for abdominal pain, anal bleeding and blood in stool.   Genitourinary: Negative for flank pain and hematuria.       Objective   PHYSICAL EXAM  Vital Signs:   Temp 97.5 °F (36.4 °C)   Ht 160 cm (63\")   Wt 113 kg (248 lb 8 oz)   BMI 44.02 kg/m²     Constitutional: Patient is without distress or deformity.  Vital signs are reviewed as above.    Neuro: No confusion; No disorientation; Alert and oriented  Pulmonary: No respiratory distress.   Skin: No pallor or diaphoresis        DATA  Result Review :         Results for orders placed or performed in visit on 10/12/21   POC Urinalysis Dipstick, Multipro    Specimen: Urine   Result Value Ref Range    Color Yellow Yellow, Straw, Dark Yellow, Brenda    Clarity, UA Clear Clear    Glucose, UA Negative Negative, 1000 mg/dL (3+) mg/dL    Bilirubin Negative Negative    Ketones, UA Negative Negative    Specific Gravity  1.015 1.005 - 1.030    Blood, UA Negative Negative    pH, Urine 5.5 5.0 - 8.0    Protein, POC Negative Negative mg/dL    Urobilinogen, UA Normal Normal    Nitrite, UA Negative Negative    Leukocytes Negative Negative         XR Abdomen KUB " "(10/12/2021 12:09)    The images for the above \"link(s)\" were made available to me to review independently.  I also reviewed the radiologist's report described above with regard to the urologic findings. My interpretation is as follows:  She continues to have what appears to be a cluster of 3 stone stones in the left upper pole.  This is unchanged from a KUB that was done on her 15 months ago.  /Rony Bañuelos MD           ASSESSMENT AND PLAN      Problem List Items Addressed This Visit     None      Visit Diagnoses     Renal calculus, left    -  Primary    Relevant Orders    POC Urinalysis Dipstick, Multipro (Completed)    XR Abdomen KUB      Stone burden is unchanged.  I would recommend she follow-up in 2 years with KUB.      FOLLOW UP     Return in about 2 years (around 10/12/2023).        (Please note that portions of this note were completed with a voice recognition program.)  Rony Bañuelos MD  10/12/21  14:07 CDT    "

## 2021-10-26 ENCOUNTER — LAB (OUTPATIENT)
Dept: LAB | Facility: HOSPITAL | Age: 46
End: 2021-10-26

## 2021-10-26 ENCOUNTER — TRANSCRIBE ORDERS (OUTPATIENT)
Dept: ADMINISTRATIVE | Facility: HOSPITAL | Age: 46
End: 2021-10-26

## 2021-10-26 DIAGNOSIS — Z80.0 FAMILY HISTORY OF PANCREATIC CANCER: ICD-10-CM

## 2021-10-26 DIAGNOSIS — K76.0 FATTY LIVER: ICD-10-CM

## 2021-10-26 DIAGNOSIS — K76.0 FATTY LIVER: Primary | ICD-10-CM

## 2021-10-26 DIAGNOSIS — E61.1 IRON DEFICIENCY: ICD-10-CM

## 2021-10-26 LAB
ALBUMIN SERPL-MCNC: 3.6 G/DL (ref 3.5–5.2)
ALP SERPL-CCNC: 76 U/L (ref 39–117)
ALT SERPL W P-5'-P-CCNC: 28 U/L (ref 1–33)
AST SERPL-CCNC: 19 U/L (ref 1–32)
BASOPHILS # BLD AUTO: 0.06 10*3/MM3 (ref 0–0.2)
BASOPHILS NFR BLD AUTO: 0.5 % (ref 0–1.5)
BILIRUB CONJ SERPL-MCNC: <0.2 MG/DL (ref 0–0.3)
BILIRUB INDIRECT SERPL-MCNC: NORMAL MG/DL
BILIRUB SERPL-MCNC: 0.2 MG/DL (ref 0–1.2)
DEPRECATED RDW RBC AUTO: 41.2 FL (ref 37–54)
EOSINOPHIL # BLD AUTO: 0.3 10*3/MM3 (ref 0–0.4)
EOSINOPHIL NFR BLD AUTO: 2.5 % (ref 0.3–6.2)
ERYTHROCYTE [DISTWIDTH] IN BLOOD BY AUTOMATED COUNT: 13.1 % (ref 12.3–15.4)
FERRITIN SERPL-MCNC: 104.5 NG/ML (ref 13–150)
HCT VFR BLD AUTO: 37.8 % (ref 34–46.6)
HGB BLD-MCNC: 12.8 G/DL (ref 12–15.9)
IMM GRANULOCYTES # BLD AUTO: 0.07 10*3/MM3 (ref 0–0.05)
IMM GRANULOCYTES NFR BLD AUTO: 0.6 % (ref 0–0.5)
IRON 24H UR-MRATE: 63 MCG/DL (ref 37–145)
IRON SATN MFR SERPL: 15 % (ref 20–50)
LYMPHOCYTES # BLD AUTO: 3.92 10*3/MM3 (ref 0.7–3.1)
LYMPHOCYTES NFR BLD AUTO: 33.1 % (ref 19.6–45.3)
MCH RBC QN AUTO: 29.5 PG (ref 26.6–33)
MCHC RBC AUTO-ENTMCNC: 33.9 G/DL (ref 31.5–35.7)
MCV RBC AUTO: 87.1 FL (ref 79–97)
MONOCYTES # BLD AUTO: 0.59 10*3/MM3 (ref 0.1–0.9)
MONOCYTES NFR BLD AUTO: 5 % (ref 5–12)
NEUTROPHILS NFR BLD AUTO: 58.3 % (ref 42.7–76)
NEUTROPHILS NFR BLD AUTO: 6.89 10*3/MM3 (ref 1.7–7)
NRBC BLD AUTO-RTO: 0 /100 WBC (ref 0–0.2)
PLATELET # BLD AUTO: 271 10*3/MM3 (ref 140–450)
PMV BLD AUTO: 9.8 FL (ref 6–12)
PROT SERPL-MCNC: 6.9 G/DL (ref 6–8.5)
RBC # BLD AUTO: 4.34 10*6/MM3 (ref 3.77–5.28)
TIBC SERPL-MCNC: 432 MCG/DL (ref 298–536)
TRANSFERRIN SERPL-MCNC: 290 MG/DL (ref 200–360)
WBC # BLD AUTO: 11.83 10*3/MM3 (ref 3.4–10.8)

## 2021-10-26 PROCEDURE — 85025 COMPLETE CBC W/AUTO DIFF WBC: CPT | Performed by: INTERNAL MEDICINE

## 2021-10-26 PROCEDURE — 80076 HEPATIC FUNCTION PANEL: CPT | Performed by: INTERNAL MEDICINE

## 2021-10-26 PROCEDURE — 83540 ASSAY OF IRON: CPT | Performed by: INTERNAL MEDICINE

## 2021-10-26 PROCEDURE — 82728 ASSAY OF FERRITIN: CPT | Performed by: INTERNAL MEDICINE

## 2021-10-26 PROCEDURE — 84466 ASSAY OF TRANSFERRIN: CPT | Performed by: INTERNAL MEDICINE

## 2021-10-26 PROCEDURE — 36415 COLL VENOUS BLD VENIPUNCTURE: CPT

## 2021-11-02 ENCOUNTER — OFFICE VISIT (OUTPATIENT)
Dept: ONCOLOGY | Facility: CLINIC | Age: 46
End: 2021-11-02

## 2021-11-02 VITALS
SYSTOLIC BLOOD PRESSURE: 138 MMHG | TEMPERATURE: 97.2 F | WEIGHT: 248.9 LBS | RESPIRATION RATE: 16 BRPM | DIASTOLIC BLOOD PRESSURE: 80 MMHG | HEIGHT: 63 IN | OXYGEN SATURATION: 98 % | BODY MASS INDEX: 44.1 KG/M2 | HEART RATE: 90 BPM

## 2021-11-02 DIAGNOSIS — E61.1 IRON DEFICIENCY: ICD-10-CM

## 2021-11-02 DIAGNOSIS — D72.829 LEUKOCYTOSIS, UNSPECIFIED TYPE: Primary | ICD-10-CM

## 2021-11-02 PROCEDURE — 99214 OFFICE O/P EST MOD 30 MIN: CPT | Performed by: INTERNAL MEDICINE

## 2021-11-02 RX ORDER — PANTOPRAZOLE SODIUM 40 MG/1
TABLET, DELAYED RELEASE ORAL
COMMUNITY
Start: 2021-10-01

## 2021-11-02 RX ORDER — CYCLOBENZAPRINE HCL 10 MG
TABLET ORAL
COMMUNITY
Start: 2021-10-14 | End: 2022-04-15

## 2021-12-10 ENCOUNTER — TRANSCRIBE ORDERS (OUTPATIENT)
Dept: ADMINISTRATIVE | Facility: HOSPITAL | Age: 46
End: 2021-12-10

## 2021-12-10 DIAGNOSIS — G89.29 OTHER CHRONIC PAIN: ICD-10-CM

## 2021-12-10 DIAGNOSIS — M25.551 HIP PAIN, RIGHT: ICD-10-CM

## 2021-12-10 DIAGNOSIS — R20.0 NUMBNESS OF LEG: Primary | ICD-10-CM

## 2021-12-23 ENCOUNTER — HOSPITAL ENCOUNTER (OUTPATIENT)
Dept: MRI IMAGING | Facility: HOSPITAL | Age: 46
Discharge: HOME | End: 2021-12-23
Admitting: ORTHOPAEDIC SURGERY

## 2021-12-23 DIAGNOSIS — R20.0 NUMBNESS OF LEG: ICD-10-CM

## 2021-12-23 DIAGNOSIS — M25.551 HIP PAIN, RIGHT: ICD-10-CM

## 2021-12-23 PROCEDURE — 72148 MRI LUMBAR SPINE W/O DYE: CPT

## 2022-01-25 ENCOUNTER — LAB (OUTPATIENT)
Dept: LAB | Facility: HOSPITAL | Age: 47
End: 2022-01-25

## 2022-01-25 DIAGNOSIS — D72.829 LEUKOCYTOSIS, UNSPECIFIED TYPE: ICD-10-CM

## 2022-01-25 DIAGNOSIS — E61.1 IRON DEFICIENCY: ICD-10-CM

## 2022-01-25 DIAGNOSIS — D72.829 LEUKOCYTOSIS, UNSPECIFIED TYPE: Primary | ICD-10-CM

## 2022-01-25 LAB
BASOPHILS # BLD AUTO: 0.08 10*3/MM3 (ref 0–0.2)
BASOPHILS NFR BLD AUTO: 0.5 % (ref 0–1.5)
DEPRECATED RDW RBC AUTO: 42.5 FL (ref 37–54)
EOSINOPHIL # BLD AUTO: 0.26 10*3/MM3 (ref 0–0.4)
EOSINOPHIL NFR BLD AUTO: 1.5 % (ref 0.3–6.2)
ERYTHROCYTE [DISTWIDTH] IN BLOOD BY AUTOMATED COUNT: 12.8 % (ref 12.3–15.4)
FERRITIN SERPL-MCNC: 102.2 NG/ML (ref 13–150)
HCT VFR BLD AUTO: 42.4 % (ref 34–46.6)
HGB BLD-MCNC: 13.6 G/DL (ref 12–15.9)
IMM GRANULOCYTES # BLD AUTO: 0.2 10*3/MM3 (ref 0–0.05)
IMM GRANULOCYTES NFR BLD AUTO: 1.2 % (ref 0–0.5)
IRON 24H UR-MRATE: 82 MCG/DL (ref 37–145)
IRON SATN MFR SERPL: 17 % (ref 20–50)
LYMPHOCYTES # BLD AUTO: 5.11 10*3/MM3 (ref 0.7–3.1)
LYMPHOCYTES NFR BLD AUTO: 29.5 % (ref 19.6–45.3)
MCH RBC QN AUTO: 28.9 PG (ref 26.6–33)
MCHC RBC AUTO-ENTMCNC: 32.1 G/DL (ref 31.5–35.7)
MCV RBC AUTO: 90.2 FL (ref 79–97)
MONOCYTES # BLD AUTO: 0.75 10*3/MM3 (ref 0.1–0.9)
MONOCYTES NFR BLD AUTO: 4.3 % (ref 5–12)
NEUTROPHILS NFR BLD AUTO: 10.9 10*3/MM3 (ref 1.7–7)
NEUTROPHILS NFR BLD AUTO: 63 % (ref 42.7–76)
NRBC BLD AUTO-RTO: 0 /100 WBC (ref 0–0.2)
PLATELET # BLD AUTO: 293 10*3/MM3 (ref 140–450)
PMV BLD AUTO: 9.3 FL (ref 6–12)
RBC # BLD AUTO: 4.7 10*6/MM3 (ref 3.77–5.28)
TIBC SERPL-MCNC: 489 MCG/DL (ref 298–536)
TRANSFERRIN SERPL-MCNC: 328 MG/DL (ref 200–360)
WBC NRBC COR # BLD: 17.3 10*3/MM3 (ref 3.4–10.8)

## 2022-01-25 PROCEDURE — 84466 ASSAY OF TRANSFERRIN: CPT

## 2022-01-25 PROCEDURE — 85025 COMPLETE CBC W/AUTO DIFF WBC: CPT

## 2022-01-25 PROCEDURE — 36415 COLL VENOUS BLD VENIPUNCTURE: CPT

## 2022-01-25 PROCEDURE — 83540 ASSAY OF IRON: CPT

## 2022-01-25 PROCEDURE — 82728 ASSAY OF FERRITIN: CPT

## 2022-01-26 ENCOUNTER — TELEPHONE (OUTPATIENT)
Dept: FAMILY MEDICINE CLINIC | Facility: CLINIC | Age: 47
End: 2022-01-26

## 2022-01-26 NOTE — TELEPHONE ENCOUNTER
Flow cytometry has been added    ----- Message from Herbie Wharton MD sent at 1/25/2022  3:54 PM CST -----  Send peripheral blood for flow cytometry on specimens from 01/25/2022

## 2022-01-27 LAB — REF LAB TEST METHOD: NORMAL

## 2022-02-01 ENCOUNTER — OFFICE VISIT (OUTPATIENT)
Dept: ONCOLOGY | Facility: CLINIC | Age: 47
End: 2022-02-01

## 2022-02-01 VITALS
BODY MASS INDEX: 45.02 KG/M2 | TEMPERATURE: 97.2 F | WEIGHT: 254.1 LBS | OXYGEN SATURATION: 98 % | RESPIRATION RATE: 16 BRPM | HEART RATE: 61 BPM | HEIGHT: 63 IN

## 2022-02-01 DIAGNOSIS — E61.1 IRON DEFICIENCY: ICD-10-CM

## 2022-02-01 DIAGNOSIS — D72.829 LEUKOCYTOSIS, UNSPECIFIED TYPE: Primary | ICD-10-CM

## 2022-02-01 PROCEDURE — 99214 OFFICE O/P EST MOD 30 MIN: CPT | Performed by: INTERNAL MEDICINE

## 2022-02-01 RX ORDER — TRAMADOL HYDROCHLORIDE 50 MG/1
TABLET ORAL
COMMUNITY
Start: 2022-01-18 | End: 2022-04-15

## 2022-02-01 RX ORDER — DILTIAZEM HYDROCHLORIDE 120 MG/1
CAPSULE, EXTENDED RELEASE ORAL
COMMUNITY
Start: 2021-11-05 | End: 2022-04-15

## 2022-02-01 RX ORDER — ASPIRIN 81 MG/1
81 TABLET ORAL DAILY
COMMUNITY
End: 2022-04-15

## 2022-02-01 RX ORDER — DIAZEPAM 5 MG/1
TABLET ORAL
COMMUNITY
Start: 2021-11-05 | End: 2022-04-15

## 2022-02-01 RX ORDER — FERROUS SULFATE 300 MG/5ML
300 LIQUID (ML) ORAL DAILY
COMMUNITY
End: 2022-04-15

## 2022-02-01 RX ORDER — METOPROLOL SUCCINATE 50 MG/1
TABLET, EXTENDED RELEASE ORAL
COMMUNITY
Start: 2021-11-05 | End: 2022-04-15

## 2022-02-01 RX ORDER — PREGABALIN 75 MG/1
CAPSULE ORAL
COMMUNITY
Start: 2021-11-30 | End: 2022-10-04

## 2022-04-15 ENCOUNTER — OFFICE VISIT (OUTPATIENT)
Dept: CARDIOLOGY | Facility: CLINIC | Age: 47
End: 2022-04-15

## 2022-04-15 VITALS
WEIGHT: 254 LBS | SYSTOLIC BLOOD PRESSURE: 140 MMHG | DIASTOLIC BLOOD PRESSURE: 62 MMHG | HEART RATE: 81 BPM | HEIGHT: 63 IN | BODY MASS INDEX: 45 KG/M2 | OXYGEN SATURATION: 99 %

## 2022-04-15 DIAGNOSIS — E66.01 MORBID OBESITY WITH BMI OF 40.0-44.9, ADULT: ICD-10-CM

## 2022-04-15 DIAGNOSIS — R00.2 PALPITATIONS: ICD-10-CM

## 2022-04-15 DIAGNOSIS — I10 PRIMARY HYPERTENSION: Primary | ICD-10-CM

## 2022-04-15 PROCEDURE — 93000 ELECTROCARDIOGRAM COMPLETE: CPT | Performed by: EMERGENCY MEDICINE

## 2022-04-15 PROCEDURE — 99214 OFFICE O/P EST MOD 30 MIN: CPT | Performed by: EMERGENCY MEDICINE

## 2022-04-15 RX ORDER — PROPRANOLOL HYDROCHLORIDE 80 MG/1
80 TABLET ORAL 3 TIMES DAILY
Qty: 180 TABLET | Refills: 5 | Status: SHIPPED | OUTPATIENT
Start: 2022-04-15

## 2022-04-15 NOTE — PROGRESS NOTES
Subjective:     Encounter Date:04/15/2022      Patient ID: Breonna Bellamy is a 46 y.o. female.    Chief Complaint:  History of Present Illness    Patient is a 46-year-old female with a history of hypertension and palpitations who presents today for a 6-month follow-up.  She says she is doing well at this time.  She denies any significant cardiac symptoms.  She had back surgery on March 1 and has been doing well since that time.  She is no longer on the aspirin due to Dr. Tinoco stopping this.  She is also no longer on the Cardizem due to a dropping her blood pressure too low.    Review of Systems   Constitutional: Negative for diaphoresis, fever and malaise/fatigue.   HENT: Negative for congestion.    Eyes: Negative for vision loss in left eye and vision loss in right eye.   Cardiovascular: Negative for chest pain, claudication, dyspnea on exertion, irregular heartbeat, leg swelling, orthopnea, palpitations and syncope.   Respiratory: Negative for cough, shortness of breath and wheezing.    Hematologic/Lymphatic: Negative for adenopathy.   Skin: Negative for rash.   Musculoskeletal: Negative for joint pain and joint swelling.   Gastrointestinal: Negative for abdominal pain, diarrhea, nausea and vomiting.   Neurological: Negative for excessive daytime sleepiness, dizziness, focal weakness, light-headedness, numbness and weakness.   Psychiatric/Behavioral: Negative for depression. The patient does not have insomnia.            Current Outpatient Medications:   •  albuterol (PROVENTIL HFA;VENTOLIN HFA) 108 (90 Base) MCG/ACT inhaler, Inhale 2 puffs Every 4 (Four) Hours As Needed for Wheezing., Disp: , Rfl:   •  busPIRone (BUSPAR) 10 MG tablet, Take 10 mg by mouth As Needed., Disp: , Rfl:   •  Calcium Acetate, Phos Binder, (CALCIUM ACETATE PO), Take  by mouth., Disp: , Rfl:   •  cholecalciferol (VITAMIN D3) 1000 units tablet, Take 1,000 Units by mouth Daily., Disp: , Rfl:   •  colesevelam (WELCHOL) 625 MG tablet,  Take 1,875 mg by mouth as needed., Disp: , Rfl:   •  dexlansoprazole (DEXILANT) 60 MG capsule, Take 60 mg by mouth Daily., Disp: , Rfl:   •  dicyclomine (BENTYL) 20 MG tablet, Take 40 mg by mouth every night., Disp: , Rfl:   •  levocetirizine (XYZAL) 5 MG tablet, , Disp: , Rfl:   •  pantoprazole (PROTONIX) 40 MG EC tablet, , Disp: , Rfl:   •  pregabalin (LYRICA) 75 MG capsule, , Disp: , Rfl:   •  propranolol (INDERAL) 80 MG tablet, Take 1 tablet by mouth 3 (Three) Times a Day., Disp: 180 tablet, Rfl: 5  •  sucralfate (CARAFATE) 1 G tablet, Take 1 g by mouth as needed., Disp: , Rfl:   •  tamsulosin (FLOMAX) 0.4 MG capsule 24 hr capsule, , Disp: , Rfl:        Objective:      Vitals:    04/15/22 0927   BP: 140/62   Pulse: 81   SpO2: 99%     Vitals and nursing note reviewed.   Constitutional:       Appearance: Normal and healthy appearance. Well-developed and not in distress.   Eyes:      Extraocular Movements: Extraocular movements intact.      Pupils: Pupils are equal, round, and reactive to light.   HENT:      Head: Normocephalic and atraumatic.    Mouth/Throat:      Pharynx: Oropharynx is clear.   Neck:      Vascular: JVD normal.      Trachea: Trachea normal.   Pulmonary:      Effort: Pulmonary effort is normal.      Breath sounds: Normal breath sounds. No wheezing. No rhonchi. No rales.   Cardiovascular:      PMI at left midclavicular line. Normal rate. Regular rhythm. Normal S1. Normal S2.      Murmurs: There is no murmur.      No gallop. No click. No rub.   Pulses:     Dorsalis pedis: 2+ bilaterally.     Posterior tibial: 2+ bilaterally.  Abdominal:      General: Bowel sounds are normal.      Palpations: Abdomen is soft.      Tenderness: There is no abdominal tenderness.   Musculoskeletal: Normal range of motion.      Cervical back: Normal range of motion and neck supple. Skin:     General: Skin is warm and dry.      Capillary Refill: Capillary refill takes less than 2 seconds.   Feet:      Right foot:      Skin  integrity: Skin integrity normal.      Left foot:      Skin integrity: Skin integrity normal.   Neurological:      Mental Status: Alert and oriented to person, place and time.      Cranial Nerves: Cranial nerves are intact.      Sensory: Sensation is intact.      Motor: Motor function is intact.      Coordination: Coordination is intact.   Psychiatric:         Speech: Speech normal.         Behavior: Behavior is cooperative.         Lab Review:         ECG 12 Lead    Date/Time: 4/15/2022 12:32 PM  Performed by: Gabriel Hadley DO  Authorized by: Gabriel Hadley DO   Comparison: compared with previous ECG   Similar to previous ECG  Rhythm: sinus rhythm  Rate: normal  Conduction: conduction normal  ST Segments: ST segments normal  T Waves: T waves normal  QRS axis: normal  Other findings: non-specific ST-T wave changes    Clinical impression: abnormal EKG              Assessment/Plan:     Problem List Items Addressed This Visit        Cardiac and Vasculature    HTN (hypertension) - Primary    Relevant Medications    propranolol (INDERAL) 80 MG tablet    Palpitations       Endocrine and Metabolic    Morbid obesity with BMI of 40.0-44.9, adult (HCC)            Recommendations/plans:      Patient doing well from a cardiovascular standpoint.  She denies any significant symptoms.  She is doing well on her current cardiac regimen that consist of Inderal 80 mg in the morning and 160 mg at night.  Recommend she continue on this current regimen as she is doing very well with it.    Follow-up with cardiology in 1 year or sooner if necessary    Gabriel Hadley DO   Interventional cardiology  [facility]  04/15/2022  12:32 CDT

## 2022-04-26 ENCOUNTER — LAB (OUTPATIENT)
Dept: LAB | Facility: HOSPITAL | Age: 47
End: 2022-04-26

## 2022-04-26 ENCOUNTER — APPOINTMENT (OUTPATIENT)
Dept: LAB | Facility: HOSPITAL | Age: 47
End: 2022-04-26

## 2022-04-26 ENCOUNTER — TRANSCRIBE ORDERS (OUTPATIENT)
Dept: ADMINISTRATIVE | Facility: HOSPITAL | Age: 47
End: 2022-04-26

## 2022-04-26 DIAGNOSIS — D72.829 LEUKOCYTOSIS, UNSPECIFIED TYPE: ICD-10-CM

## 2022-04-26 DIAGNOSIS — K76.0 FATTY METAMORPHOSIS OF LIVER: Primary | ICD-10-CM

## 2022-04-26 DIAGNOSIS — K76.0 FATTY METAMORPHOSIS OF LIVER: ICD-10-CM

## 2022-04-26 DIAGNOSIS — E61.1 IRON DEFICIENCY: ICD-10-CM

## 2022-04-26 LAB
ALBUMIN SERPL-MCNC: 3.9 G/DL (ref 3.5–5.2)
ALP SERPL-CCNC: 74 U/L (ref 39–117)
ALT SERPL W P-5'-P-CCNC: 32 U/L (ref 1–33)
AST SERPL-CCNC: 40 U/L (ref 1–32)
BILIRUB CONJ SERPL-MCNC: <0.2 MG/DL (ref 0–0.3)
BILIRUB INDIRECT SERPL-MCNC: ABNORMAL MG/DL
BILIRUB SERPL-MCNC: 0.3 MG/DL (ref 0–1.2)
BURR CELLS BLD QL SMEAR: ABNORMAL
DEPRECATED RDW RBC AUTO: 41.3 FL (ref 37–54)
EOSINOPHIL # BLD MANUAL: 0.4 10*3/MM3 (ref 0–0.4)
EOSINOPHIL NFR BLD MANUAL: 3 % (ref 0.3–6.2)
ERYTHROCYTE [DISTWIDTH] IN BLOOD BY AUTOMATED COUNT: 12.7 % (ref 12.3–15.4)
FERRITIN SERPL-MCNC: 102.7 NG/ML (ref 13–150)
HCT VFR BLD AUTO: 41.1 % (ref 34–46.6)
HGB BLD-MCNC: 13.3 G/DL (ref 12–15.9)
IRON 24H UR-MRATE: 79 MCG/DL (ref 37–145)
IRON SATN MFR SERPL: 17 % (ref 20–50)
LYMPHOCYTES # BLD MANUAL: 4.35 10*3/MM3 (ref 0.7–3.1)
LYMPHOCYTES NFR BLD MANUAL: 7 % (ref 5–12)
MCH RBC QN AUTO: 29 PG (ref 26.6–33)
MCHC RBC AUTO-ENTMCNC: 32.4 G/DL (ref 31.5–35.7)
MCV RBC AUTO: 89.5 FL (ref 79–97)
MONOCYTES # BLD: 0.92 10*3/MM3 (ref 0.1–0.9)
NEUTROPHILS # BLD AUTO: 7.51 10*3/MM3 (ref 1.7–7)
NEUTROPHILS NFR BLD MANUAL: 57 % (ref 42.7–76)
PLAT MORPH BLD: NORMAL
PLATELET # BLD AUTO: 287 10*3/MM3 (ref 140–450)
PMV BLD AUTO: 9.6 FL (ref 6–12)
POIKILOCYTOSIS BLD QL SMEAR: ABNORMAL
PROT SERPL-MCNC: 7 G/DL (ref 6–8.5)
RBC # BLD AUTO: 4.59 10*6/MM3 (ref 3.77–5.28)
TIBC SERPL-MCNC: 454 MCG/DL (ref 298–536)
TRANSFERRIN SERPL-MCNC: 305 MG/DL (ref 200–360)
VARIANT LYMPHS NFR BLD MANUAL: 24 % (ref 19.6–45.3)
VARIANT LYMPHS NFR BLD MANUAL: 9 % (ref 0–5)
WBC MORPH BLD: NORMAL
WBC NRBC COR # BLD: 13.18 10*3/MM3 (ref 3.4–10.8)

## 2022-04-26 PROCEDURE — 85025 COMPLETE CBC W/AUTO DIFF WBC: CPT

## 2022-04-26 PROCEDURE — 82728 ASSAY OF FERRITIN: CPT

## 2022-04-26 PROCEDURE — 84466 ASSAY OF TRANSFERRIN: CPT

## 2022-04-26 PROCEDURE — 80076 HEPATIC FUNCTION PANEL: CPT

## 2022-04-26 PROCEDURE — 85007 BL SMEAR W/DIFF WBC COUNT: CPT

## 2022-04-26 PROCEDURE — 83540 ASSAY OF IRON: CPT

## 2022-04-26 PROCEDURE — 36415 COLL VENOUS BLD VENIPUNCTURE: CPT

## 2022-05-03 ENCOUNTER — OFFICE VISIT (OUTPATIENT)
Dept: ONCOLOGY | Facility: CLINIC | Age: 47
End: 2022-05-03

## 2022-05-03 VITALS
WEIGHT: 255.9 LBS | HEART RATE: 83 BPM | OXYGEN SATURATION: 97 % | TEMPERATURE: 97.3 F | DIASTOLIC BLOOD PRESSURE: 70 MMHG | SYSTOLIC BLOOD PRESSURE: 136 MMHG | RESPIRATION RATE: 16 BRPM | HEIGHT: 63 IN | BODY MASS INDEX: 45.34 KG/M2

## 2022-05-03 DIAGNOSIS — D72.829 LEUKOCYTOSIS, UNSPECIFIED TYPE: Primary | ICD-10-CM

## 2022-05-03 DIAGNOSIS — E61.1 IRON DEFICIENCY: ICD-10-CM

## 2022-05-03 PROBLEM — Z98.890 S/P LUMBAR MICRODISCECTOMY: Status: ACTIVE | Noted: 2022-03-01

## 2022-05-03 PROCEDURE — 99214 OFFICE O/P EST MOD 30 MIN: CPT | Performed by: INTERNAL MEDICINE

## 2022-05-03 RX ORDER — FERROUS SULFATE 325(65) MG
325 TABLET ORAL
Qty: 30 TABLET | Refills: 3 | Status: SHIPPED | OUTPATIENT
Start: 2022-05-03 | End: 2022-10-04

## 2022-05-16 ENCOUNTER — TELEPHONE (OUTPATIENT)
Dept: UROLOGY | Facility: CLINIC | Age: 47
End: 2022-05-16

## 2022-05-16 ENCOUNTER — HOSPITAL ENCOUNTER (EMERGENCY)
Facility: HOSPITAL | Age: 47
Discharge: HOME | End: 2022-05-16
Admitting: EMERGENCY MEDICINE

## 2022-05-16 ENCOUNTER — APPOINTMENT (OUTPATIENT)
Dept: CT IMAGING | Facility: HOSPITAL | Age: 47
End: 2022-05-16

## 2022-05-16 VITALS
BODY MASS INDEX: 45 KG/M2 | HEART RATE: 80 BPM | DIASTOLIC BLOOD PRESSURE: 84 MMHG | SYSTOLIC BLOOD PRESSURE: 108 MMHG | RESPIRATION RATE: 22 BRPM | OXYGEN SATURATION: 95 % | WEIGHT: 254 LBS | HEIGHT: 63 IN | TEMPERATURE: 98 F

## 2022-05-16 DIAGNOSIS — N39.0 URINARY TRACT INFECTION WITH HEMATURIA, SITE UNSPECIFIED: ICD-10-CM

## 2022-05-16 DIAGNOSIS — K76.0 HEPATIC STEATOSIS: ICD-10-CM

## 2022-05-16 DIAGNOSIS — R31.9 URINARY TRACT INFECTION WITH HEMATURIA, SITE UNSPECIFIED: ICD-10-CM

## 2022-05-16 DIAGNOSIS — N20.0 RENAL CALCULUS, LEFT: ICD-10-CM

## 2022-05-16 DIAGNOSIS — N20.1 RIGHT URETERAL STONE: Primary | ICD-10-CM

## 2022-05-16 LAB
ALBUMIN SERPL-MCNC: 3.9 G/DL (ref 3.5–5.2)
ALBUMIN/GLOB SERPL: 1.2 G/DL
ALP SERPL-CCNC: 84 U/L (ref 39–117)
ALT SERPL W P-5'-P-CCNC: 22 U/L (ref 1–33)
ANION GAP SERPL CALCULATED.3IONS-SCNC: 11 MMOL/L (ref 5–15)
APTT PPP: 28.3 SECONDS (ref 24.1–35)
AST SERPL-CCNC: 24 U/L (ref 1–32)
BACTERIA UR QL AUTO: ABNORMAL /HPF
BASOPHILS # BLD AUTO: 0.06 10*3/MM3 (ref 0–0.2)
BASOPHILS NFR BLD AUTO: 0.3 % (ref 0–1.5)
BILIRUB SERPL-MCNC: 0.4 MG/DL (ref 0–1.2)
BILIRUB UR QL STRIP: NEGATIVE
BUN SERPL-MCNC: 12 MG/DL (ref 6–20)
BUN/CREAT SERPL: 13.8 (ref 7–25)
CALCIUM SPEC-SCNC: 8.9 MG/DL (ref 8.6–10.5)
CHLORIDE SERPL-SCNC: 103 MMOL/L (ref 98–107)
CLARITY UR: ABNORMAL
CO2 SERPL-SCNC: 23 MMOL/L (ref 22–29)
COLOR UR: YELLOW
CREAT SERPL-MCNC: 0.87 MG/DL (ref 0.57–1)
D-LACTATE SERPL-SCNC: 1.4 MMOL/L (ref 0.5–2)
DEPRECATED RDW RBC AUTO: 39.8 FL (ref 37–54)
EGFRCR SERPLBLD CKD-EPI 2021: 83.9 ML/MIN/1.73
EOSINOPHIL # BLD AUTO: 0.24 10*3/MM3 (ref 0–0.4)
EOSINOPHIL NFR BLD AUTO: 1.2 % (ref 0.3–6.2)
ERYTHROCYTE [DISTWIDTH] IN BLOOD BY AUTOMATED COUNT: 12.7 % (ref 12.3–15.4)
GLOBULIN UR ELPH-MCNC: 3.2 GM/DL
GLUCOSE SERPL-MCNC: 121 MG/DL (ref 65–99)
GLUCOSE UR STRIP-MCNC: NEGATIVE MG/DL
HCT VFR BLD AUTO: 42.3 % (ref 34–46.6)
HGB BLD-MCNC: 14.2 G/DL (ref 12–15.9)
HGB UR QL STRIP.AUTO: ABNORMAL
HYALINE CASTS UR QL AUTO: ABNORMAL /LPF
IMM GRANULOCYTES # BLD AUTO: 0.17 10*3/MM3 (ref 0–0.05)
IMM GRANULOCYTES NFR BLD AUTO: 0.9 % (ref 0–0.5)
INR PPP: 1 (ref 0.91–1.09)
KETONES UR QL STRIP: NEGATIVE
LEUKOCYTE ESTERASE UR QL STRIP.AUTO: ABNORMAL
LYMPHOCYTES # BLD AUTO: 2.51 10*3/MM3 (ref 0.7–3.1)
LYMPHOCYTES NFR BLD AUTO: 12.8 % (ref 19.6–45.3)
MCH RBC QN AUTO: 29.2 PG (ref 26.6–33)
MCHC RBC AUTO-ENTMCNC: 33.6 G/DL (ref 31.5–35.7)
MCV RBC AUTO: 87 FL (ref 79–97)
MONOCYTES # BLD AUTO: 0.82 10*3/MM3 (ref 0.1–0.9)
MONOCYTES NFR BLD AUTO: 4.2 % (ref 5–12)
MUCOUS THREADS URNS QL MICRO: ABNORMAL /HPF
NEUTROPHILS NFR BLD AUTO: 15.87 10*3/MM3 (ref 1.7–7)
NEUTROPHILS NFR BLD AUTO: 80.6 % (ref 42.7–76)
NITRITE UR QL STRIP: NEGATIVE
NRBC BLD AUTO-RTO: 0 /100 WBC (ref 0–0.2)
PH UR STRIP.AUTO: 6 [PH] (ref 5–8)
PLATELET # BLD AUTO: 287 10*3/MM3 (ref 140–450)
PMV BLD AUTO: 9.5 FL (ref 6–12)
POTASSIUM SERPL-SCNC: 4.3 MMOL/L (ref 3.5–5.2)
PROCALCITONIN SERPL-MCNC: 0.05 NG/ML (ref 0–0.25)
PROT SERPL-MCNC: 7.1 G/DL (ref 6–8.5)
PROT UR QL STRIP: ABNORMAL
PROTHROMBIN TIME: 12.8 SECONDS (ref 11.9–14.6)
RBC # BLD AUTO: 4.86 10*6/MM3 (ref 3.77–5.28)
RBC # UR STRIP: ABNORMAL /HPF
REF LAB TEST METHOD: ABNORMAL
SARS-COV-2 RNA PNL SPEC NAA+PROBE: NOT DETECTED
SODIUM SERPL-SCNC: 137 MMOL/L (ref 136–145)
SP GR UR STRIP: 1.02 (ref 1–1.03)
SQUAMOUS #/AREA URNS HPF: ABNORMAL /HPF
UROBILINOGEN UR QL STRIP: ABNORMAL
WBC # UR STRIP: ABNORMAL /HPF
WBC NRBC COR # BLD: 19.67 10*3/MM3 (ref 3.4–10.8)

## 2022-05-16 PROCEDURE — 80053 COMPREHEN METABOLIC PANEL: CPT | Performed by: PHYSICIAN ASSISTANT

## 2022-05-16 PROCEDURE — 85730 THROMBOPLASTIN TIME PARTIAL: CPT | Performed by: PHYSICIAN ASSISTANT

## 2022-05-16 PROCEDURE — 96365 THER/PROPH/DIAG IV INF INIT: CPT

## 2022-05-16 PROCEDURE — 84145 PROCALCITONIN (PCT): CPT | Performed by: PHYSICIAN ASSISTANT

## 2022-05-16 PROCEDURE — 96376 TX/PRO/DX INJ SAME DRUG ADON: CPT

## 2022-05-16 PROCEDURE — 25010000002 CEFTRIAXONE PER 250 MG: Performed by: PHYSICIAN ASSISTANT

## 2022-05-16 PROCEDURE — 85610 PROTHROMBIN TIME: CPT | Performed by: PHYSICIAN ASSISTANT

## 2022-05-16 PROCEDURE — 25010000002 IOPAMIDOL 61 % SOLUTION: Performed by: PHYSICIAN ASSISTANT

## 2022-05-16 PROCEDURE — 87086 URINE CULTURE/COLONY COUNT: CPT | Performed by: PHYSICIAN ASSISTANT

## 2022-05-16 PROCEDURE — 25010000002 MORPHINE PER 10 MG: Performed by: EMERGENCY MEDICINE

## 2022-05-16 PROCEDURE — 87040 BLOOD CULTURE FOR BACTERIA: CPT | Performed by: PHYSICIAN ASSISTANT

## 2022-05-16 PROCEDURE — 0 HYDROMORPHONE 1 MG/ML SOLUTION: Performed by: EMERGENCY MEDICINE

## 2022-05-16 PROCEDURE — 96375 TX/PRO/DX INJ NEW DRUG ADDON: CPT

## 2022-05-16 PROCEDURE — 74177 CT ABD & PELVIS W/CONTRAST: CPT

## 2022-05-16 PROCEDURE — 87635 SARS-COV-2 COVID-19 AMP PRB: CPT | Performed by: PHYSICIAN ASSISTANT

## 2022-05-16 PROCEDURE — 85025 COMPLETE CBC W/AUTO DIFF WBC: CPT | Performed by: PHYSICIAN ASSISTANT

## 2022-05-16 PROCEDURE — 25010000002 ONDANSETRON PER 1 MG: Performed by: PHYSICIAN ASSISTANT

## 2022-05-16 PROCEDURE — 99283 EMERGENCY DEPT VISIT LOW MDM: CPT

## 2022-05-16 PROCEDURE — 83605 ASSAY OF LACTIC ACID: CPT | Performed by: PHYSICIAN ASSISTANT

## 2022-05-16 PROCEDURE — 81001 URINALYSIS AUTO W/SCOPE: CPT | Performed by: PHYSICIAN ASSISTANT

## 2022-05-16 PROCEDURE — 25010000002 ONDANSETRON PER 1 MG: Performed by: EMERGENCY MEDICINE

## 2022-05-16 RX ORDER — ONDANSETRON 4 MG/1
4 TABLET, ORALLY DISINTEGRATING ORAL EVERY 6 HOURS PRN
Qty: 12 TABLET | Refills: 0 | Status: SHIPPED | OUTPATIENT
Start: 2022-05-16

## 2022-05-16 RX ORDER — CEFDINIR 300 MG/1
300 CAPSULE ORAL 2 TIMES DAILY
Qty: 14 CAPSULE | Refills: 0 | Status: SHIPPED | OUTPATIENT
Start: 2022-05-16 | End: 2022-05-23

## 2022-05-16 RX ORDER — TAMSULOSIN HYDROCHLORIDE 0.4 MG/1
1 CAPSULE ORAL DAILY
Qty: 30 CAPSULE | Refills: 0 | Status: SHIPPED | OUTPATIENT
Start: 2022-05-16

## 2022-05-16 RX ORDER — ONDANSETRON 2 MG/ML
4 INJECTION INTRAMUSCULAR; INTRAVENOUS ONCE
Status: COMPLETED | OUTPATIENT
Start: 2022-05-16 | End: 2022-05-16

## 2022-05-16 RX ORDER — SODIUM CHLORIDE 0.9 % (FLUSH) 0.9 %
10 SYRINGE (ML) INJECTION AS NEEDED
Status: DISCONTINUED | OUTPATIENT
Start: 2022-05-16 | End: 2022-05-16 | Stop reason: HOSPADM

## 2022-05-16 RX ADMIN — SODIUM CHLORIDE, POTASSIUM CHLORIDE, SODIUM LACTATE AND CALCIUM CHLORIDE 1000 ML: 600; 310; 30; 20 INJECTION, SOLUTION INTRAVENOUS at 13:47

## 2022-05-16 RX ADMIN — ONDANSETRON 4 MG: 2 INJECTION INTRAMUSCULAR; INTRAVENOUS at 17:06

## 2022-05-16 RX ADMIN — ONDANSETRON 4 MG: 2 INJECTION INTRAMUSCULAR; INTRAVENOUS at 13:48

## 2022-05-16 RX ADMIN — SODIUM CHLORIDE 1 G: 9 INJECTION, SOLUTION INTRAVENOUS at 15:58

## 2022-05-16 RX ADMIN — MORPHINE SULFATE 4 MG: 4 INJECTION, SOLUTION INTRAMUSCULAR; INTRAVENOUS at 14:41

## 2022-05-16 RX ADMIN — HYDROMORPHONE HYDROCHLORIDE 1 MG: 1 INJECTION, SOLUTION INTRAMUSCULAR; INTRAVENOUS; SUBCUTANEOUS at 15:54

## 2022-05-16 RX ADMIN — IOPAMIDOL 100 ML: 612 INJECTION, SOLUTION INTRAVENOUS at 14:52

## 2022-05-16 NOTE — TELEPHONE ENCOUNTER
Patient called and stated at 0630 she was having horrible right sided flank pain. She stated that the pain has worsened since then and she is vomiting. I advised her to go to the ED. Patient voiced understanding and stated that she would go.

## 2022-05-16 NOTE — DISCHARGE INSTRUCTIONS
Please continue taking your Flomax daily until your stone passes or otherwise advised by Dr. Bañuelos.  Please increase your hydration with water, continue to use the pain medications at home which works for you.  Please follow with Dr. Bañuelos within the next 48 hours for close reevaluation.  Please complete the antibiotics in their entirety.  Please continue to work on your diet as you have evidence of fatty liver disease which at this point is reversible.  Should you develop any new or worsening symptoms please return to the ER for further evaluation.

## 2022-05-16 NOTE — ED PROVIDER NOTES
"Subjective   History of Present Illness    Patient is a 46-year-old female presenting to ED with right-sided flank pain, nausea, vomiting.  PMH significant for numerous kidney stones, hypertension, PCOS, IBS, status post hysterectomy, leukocytosis.  Patient states she has a history of \"chronic kidney stones\" for which she is usually able to pass them at home on her own.  Patient states that she will minimally have pain, never has hematuria, and 1 stone required a basket procedure however she has never required lithotripsy or stents.  Patient states over the past week she felt as though she might be getting ready to pass 1 with slight discomfort.  Patient states that this morning she was woken up around 5 AM with a pain in her right flank which has progressively worsened and is now radiating towards her right lower quadrant.  Patient states that today's stone \"just felt like something was different\" describing that she developed nausea, vomiting, as well as hematuria which are all abnormal for her kidney stones.  Patient denies any left-sided flank pain, left abdominal pain, fevers, chills, diaphoresis.  Patient tried taking a dose of home pain medication around 7 AM this morning as well as attempted to take her Flomax but reports that she threw all of these medications up at which time she presents for further evaluation.  Patient states that she does not take Flomax on a daily basis but when \"she feels like a stone is coming on.\"  Patient denies any known recent sick contact.  Patient states that she follows with Dr. Bañuelos for management of her urology care.  Patient did note that at her last routine visit she was advised she had a left-sided stones but was unaware of having any right-sided stones.    Records reviewed show patient last seen by urology on 10/12/2021 for left renal calculus.    Review of Systems   Constitutional: Negative.  Negative for chills, diaphoresis and fever.   HENT: Negative.    Eyes: " Negative.    Respiratory: Negative.    Cardiovascular: Negative.    Gastrointestinal: Positive for abdominal pain (RLQ), nausea and vomiting. Negative for constipation and diarrhea.   Genitourinary: Positive for flank pain (right) and hematuria. Negative for dysuria.   Skin: Negative.    Neurological: Negative.    Psychiatric/Behavioral: Negative.    All other systems reviewed and are negative.      Past Medical History:   Diagnosis Date   • Abnormal Pap smear of cervix    • Anemia    • Asthma    • Cervical dysplasia    • Hypertension    • IBS (irritable bowel syndrome)    • IBS (irritable bowel syndrome)    • IBS (irritable bowel syndrome)    • In vitro fertilization    • Infertility, female    • Kidney stones    • Kidney stones    • Leukocytosis    • LGSIL (low grade squamous intraepithelial dysplasia)    • Polycystic ovarian disease    • PONV (postoperative nausea and vomiting)    • Spontaneous      x2   • Tachycardia        Allergies   Allergen Reactions   • Duexis [Ibuprofen-Famotidine] Anaphylaxis     Buffer in medication is what allergic to   • Famotidine Anaphylaxis   • Other Swelling     AVOCADO- EAR AND THROAT SWELLING   • Sulfa Antibiotics Anaphylaxis   • Fish-Derived Products Hives and Nausea And Vomiting   • Avocado Hives and Swelling   • Procardia [Nifedipine] Swelling and Rash       Past Surgical History:   Procedure Laterality Date   • BACK SURGERY     • BONE MARROW BIOPSY     • CHOLECYSTECTOMY     • COLONOSCOPY     • CYSTOSCOPY N/A 2016    Procedure: CYSTOSCOPY;  Surgeon: Jonathan Figueroa MD;  Location: [facility];  Service:    • CYSTOSCOPY ELECTROHYDRAULIC LITHOTRIPSY     • EYE SURGERY      LASIK   • HYSTEROSCOPY ENDOMETRIAL ABLATION      X2   • KIDNEY STONE SURGERY      removed   • SALPINGECTOMY Bilateral    • SKIN BIOPSY     • TOTAL LAPAROSCOPIC HYSTERECTOMY N/A 2016    Procedure: TOTAL LAPAROSCOPIC HYSTERECTOMY WITH DAVINCI SI ROBOT WITH CYSTO;  Surgeon: Jonathan Figueroa MD;   Location: [facility] OR;  Service:        Family History   Problem Relation Age of Onset   • Anesthesia problems Mother    • Hypertension Mother    • Anesthesia problems Maternal Grandmother    • Hypertension Father    • Arrhythmia Father    • Colon cancer Paternal Grandmother    • Pancreatic cancer Paternal Grandmother    • Liver cancer Paternal Grandfather    • Heart attack Paternal Grandfather    • Ovarian cancer Neg Hx    • Breast cancer Neg Hx    • Uterine cancer Neg Hx        Social History     Socioeconomic History   • Marital status:    Tobacco Use   • Smoking status: Never Smoker   • Smokeless tobacco: Never Used   Vaping Use   • Vaping Use: Never used   Substance and Sexual Activity   • Alcohol use: No     Comment: occasional   • Drug use: No   • Sexual activity: Yes     Partners: Male     Birth control/protection: None           Objective   Physical Exam  Vitals and nursing note reviewed.   Constitutional:       General: She is in acute distress (appears uncomfortable dut to pain, restless in bed).      Appearance: Normal appearance. She is well-developed and well-groomed. She is obese. She is not toxic-appearing or diaphoretic.   HENT:      Head: Normocephalic.      Mouth/Throat:      Mouth: Mucous membranes are moist.      Pharynx: Oropharynx is clear.   Eyes:      Conjunctiva/sclera: Conjunctivae normal.      Pupils: Pupils are equal, round, and reactive to light.   Cardiovascular:      Rate and Rhythm: Normal rate.   Pulmonary:      Effort: Pulmonary effort is normal.      Breath sounds: Normal breath sounds.   Abdominal:      General: Bowel sounds are normal. There is no distension.      Palpations: Abdomen is soft.      Tenderness: There is no abdominal tenderness. There is right CVA tenderness. There is no left CVA tenderness.   Musculoskeletal:         General: Normal range of motion.      Cervical back: Normal range of motion and neck supple.      Right lower leg: No edema.      Left lower  leg: No edema.   Skin:     General: Skin is warm and dry.      Coloration: Skin is not jaundiced.   Neurological:      Mental Status: She is alert and oriented to person, place, and time.      Gait: Gait normal.   Psychiatric:         Attention and Perception: Attention normal.         Mood and Affect: Mood and affect normal.         Speech: Speech normal.         Behavior: Behavior normal. Behavior is cooperative.         Procedures           ED Course                                                 MDM  Number of Diagnoses or Management Options     Amount and/or Complexity of Data Reviewed  Clinical lab tests: reviewed and ordered  Tests in the radiology section of CPT®: reviewed and ordered  Tests in the medicine section of CPT®: ordered and reviewed  Decide to obtain previous medical records or to obtain history from someone other than the patient: yes  Review and summarize past medical records: yes  Discuss the patient with other providers: yes (Dr. Felicitas Andrade (attending))      Patient is a 46-year-old female presenting to ED with right-sided flank pain, nausea, vomiting.  PMH significant for numerous kidney stones, hypertension, PCOS, IBS, status post hysterectomy.  CBC is leukocytosis of 19.67, ANC 15.7, elevated relative neutrophils 80.6%, decreased relative lymphocytes 12.8%.  CMP with normal renal functions, no hepatic dysfunction, and no electrolyte disturbances.  Procalcitonin lactic acid WNL.  PT/INR WNL.  Urinalysis with trace leukocytes, TNTC RBC, 0-2 WBC, trace bacteria, negative nitrates. CT the abdomen and pelvis shows: 3 mm distal right ureteral calculus causing mild right hydroureteronephrosis, pronounced right perinephric and periureteral fat stranding and ill-defined fluid recommend clinical correlation to exclude superimposed infection, multiple nonobstructing left renal calculi, hepatic steatosis.  Patient was given a dose of morphine with only minimal improvement of her pain for which he  was given Dilaudid and had almost complete resolution of her symptoms.  Patient given a fluid bolus as well as Rocephin.  Discussed with patient need for continued outpatient treatment of urinary tract infection.  Patient with history of chronic leukocytosis with elevation from her baseline today at 19.67.  Remainder of labs are reassuring with normal lactic acid and procalcitonin, no other acute lab abnormalities.  Offered patient inpatient versus outpatient treatment and she wishes to proceed at this time with a trial of oral antibiotics and close outpatient follow with primary care provider versus urologist.  Discussed importance of hydration, need for kidney stone medications to include Flomax, antiemetics, and her home pain control.  Discussed return precautions and need for immediate return to ED should she develop any new or worsening symptoms.  Patient very appreciative, tolerating p.o. fluids without difficulty, and is stable for discharge at this time.      Final diagnoses:   Right ureteral stone   Urinary tract infection with hematuria, site unspecified   Renal calculus, left   Hepatic steatosis       ED Disposition  ED Disposition     ED Disposition   Discharge    Condition   Stable    Comment   --             Rony Bañuelos MD  [facility] [address] [id number] [phone]    Schedule an appointment as soon as possible for a visit in 2 day(s)      Antoine Hernandez, [address]  [address]  [facility] [id number] [phone]    Schedule an appointment as soon as possible for a visit in 2 day(s)      [facility] Emergency Department  [address] [place] [id number] [phone]  Follow up  As needed         Medication List      New Prescriptions    cefdinir 300 MG capsule  Commonly known as: OMNICEF  Take 1 capsule by mouth 2 (Two) Times a Day for 7 days.     ondansetron ODT 4 MG disintegrating tablet  Commonly known as: ZOFRAN-ODT  Place 1 tablet on  the tongue Every 6 (Six) Hours As Needed for Nausea.        Changed    tamsulosin 0.4 MG capsule 24 hr capsule  Commonly known as: FLOMAX  Take 1 capsule by mouth Daily.  What changed:   · how much to take  · how to take this  · when to take this           Where to Get Your Medications      These medications were sent to [facility] - [address] - [phone]  - [phone] FX  [address] [place] [address]    Phone: [phone]   · cefdinir 300 MG capsule  · ondansetron ODT 4 MG disintegrating tablet  · tamsulosin 0.4 MG capsule 24 hr capsule          Moose Mathias PA-C  05/17/22 1000

## 2022-05-17 LAB — BACTERIA SPEC AEROBE CULT: NORMAL

## 2022-05-20 NOTE — PROGRESS NOTES
Subjective    Ms. Bellamy is 46 y.o. female    Chief Complaint: ER follow up right ureteral stone and kidney stones    History of Present Illness    46-year-old female established patient in for follow-up regarding recent ER visit on 5/16/2022 for complaints of right flank pain, nausea vomiting and hematuria.  At that time patient had a CT confirmed 3 mm right distal ureteral stone near the UVJ with mild hydronephrosis and perinephric and periureteral fat stranding.  As well as 3 small nonobstructing left renal stones.  During ER visit patient was found to have a high white count at 19.67 although patient reports a history of leukocytosis for which she sees Dr. Tinoco on regarding.  Patient's blood cultures were normal as well as her lactic acid. Patient was placed on a 7-day course of cefdinir and patient has completed the cefdinir at this time.  At this time patient's KUB no stone in the right distal ureter is seen.  Patient reports at this time that she passed her stone approximately 1 week ago and that she was able to collect the stone for us to send for evaluation although she has left the stone at home at this time.  Patient reports that her flank pain has resolved and she is no longer passing blood in her urine.    The following portions of the patient's history were reviewed and updated as appropriate: allergies, current medications, past family history, past medical history, past social history, past surgical history and problem list.    Review of Systems   Constitutional: Negative for chills, fatigue and fever.   Gastrointestinal: Positive for nausea and vomiting.   Genitourinary: Positive for flank pain, hematuria and pelvic pain. Negative for decreased urine volume, difficulty urinating, dysuria, frequency and urgency.         Current Outpatient Medications:   •  albuterol (PROVENTIL HFA;VENTOLIN HFA) 108 (90 Base) MCG/ACT inhaler, Inhale 2 puffs Every 4 (Four) Hours As Needed for Wheezing., Disp: , Rfl:    •  busPIRone (BUSPAR) 10 MG tablet, Take 10 mg by mouth As Needed., Disp: , Rfl:   •  Calcium Acetate, Phos Binder, (CALCIUM ACETATE PO), Take  by mouth., Disp: , Rfl:   •  cholecalciferol (VITAMIN D3) 1000 units tablet, Take 1,000 Units by mouth Daily., Disp: , Rfl:   •  colesevelam (WELCHOL) 625 MG tablet, Take 1,875 mg by mouth as needed., Disp: , Rfl:   •  dexlansoprazole (DEXILANT) 60 MG capsule, Take 60 mg by mouth Daily., Disp: , Rfl:   •  dicyclomine (BENTYL) 20 MG tablet, Take 40 mg by mouth every night., Disp: , Rfl:   •  ferrous sulfate 325 (65 FE) MG tablet, Take 1 tablet by mouth Daily With Breakfast., Disp: 30 tablet, Rfl: 3  •  levocetirizine (XYZAL) 5 MG tablet, , Disp: , Rfl:   •  MISC NATURAL PRODUCTS PO, Take  by mouth. Muscle relaxer, Disp: , Rfl:   •  ondansetron ODT (ZOFRAN-ODT) 4 MG disintegrating tablet, Place 1 tablet on the tongue Every 6 (Six) Hours As Needed for Nausea., Disp: 12 tablet, Rfl: 0  •  pantoprazole (PROTONIX) 40 MG EC tablet, , Disp: , Rfl:   •  pregabalin (LYRICA) 75 MG capsule, , Disp: , Rfl:   •  propranolol (INDERAL) 80 MG tablet, Take 1 tablet by mouth 3 (Three) Times a Day., Disp: 180 tablet, Rfl: 5  •  sucralfate (CARAFATE) 1 G tablet, Take 1 g by mouth as needed., Disp: , Rfl:   •  tamsulosin (FLOMAX) 0.4 MG capsule 24 hr capsule, Take 1 capsule by mouth Daily., Disp: 30 capsule, Rfl: 0    Past Medical History:   Diagnosis Date   • Abnormal Pap smear of cervix    • Anemia    • Asthma    • Cervical dysplasia    • Hypertension    • IBS (irritable bowel syndrome)    • IBS (irritable bowel syndrome)    • IBS (irritable bowel syndrome)    • In vitro fertilization    • Infertility, female    • Kidney stones    • Kidney stones    • Leukocytosis    • LGSIL (low grade squamous intraepithelial dysplasia)    • Polycystic ovarian disease    • PONV (postoperative nausea and vomiting)    • Spontaneous      x2   • Tachycardia        Past Surgical History:   Procedure  "Laterality Date   • BACK SURGERY     • BONE MARROW BIOPSY     • CHOLECYSTECTOMY     • COLONOSCOPY     • CYSTOSCOPY N/A 09/19/2016    Procedure: CYSTOSCOPY;  Surgeon: Jonathan Figueroa MD;  Location: [facility] OR;  Service:    • CYSTOSCOPY ELECTROHYDRAULIC LITHOTRIPSY     • EYE SURGERY      LASIK   • HYSTEROSCOPY ENDOMETRIAL ABLATION      X2   • KIDNEY STONE SURGERY      removed   • SALPINGECTOMY Bilateral    • SKIN BIOPSY     • TOTAL LAPAROSCOPIC HYSTERECTOMY N/A 09/19/2016    Procedure: TOTAL LAPAROSCOPIC HYSTERECTOMY WITH DAVINCI SI ROBOT WITH CYSTO;  Surgeon: Jonathan Figueroa MD;  Location: [facility] OR;  Service:        Social History     Socioeconomic History   • Marital status:    Tobacco Use   • Smoking status: Never Smoker   • Smokeless tobacco: Never Used   Vaping Use   • Vaping Use: Never used   Substance and Sexual Activity   • Alcohol use: No     Comment: occasional   • Drug use: No   • Sexual activity: Yes     Partners: Male     Birth control/protection: None       Family History   Problem Relation Age of Onset   • Anesthesia problems Mother    • Hypertension Mother    • Anesthesia problems Maternal Grandmother    • Hypertension Father    • Arrhythmia Father    • Colon cancer Paternal Grandmother    • Pancreatic cancer Paternal Grandmother    • Liver cancer Paternal Grandfather    • Heart attack Paternal Grandfather    • Ovarian cancer Neg Hx    • Breast cancer Neg Hx    • Uterine cancer Neg Hx        Objective    Ht 160 cm (63\")   Wt 111 kg (245 lb)   LMP 09/13/2016 Comment: negative hcg noted to chart  BMI 43.40 kg/m²     Physical Exam  Nursing note reviewed.   Constitutional:       General: She is not in acute distress.     Appearance: Normal appearance. She is not ill-appearing.   HENT:      Nose: No congestion.   Abdominal:      Tenderness: There is right CVA tenderness. There is no left CVA tenderness.      Hernia: No hernia is present.   Skin:     General: Skin is warm and dry.   Neurological: "      Mental Status: She is alert and oriented to person, place, and time.   Psychiatric:         Mood and Affect: Mood normal.         Behavior: Behavior normal.             Results for orders placed or performed in visit on 05/24/22   POC Urinalysis Dipstick    Specimen: Urine   Result Value Ref Range    Color Dark Yellow Yellow, Straw, Dark Yellow, Brenda    Clarity, UA Clear Clear    Glucose, UA Negative Negative, 1000 mg/dL (3+) mg/dL    Bilirubin Negative Negative    Ketones, UA Negative Negative    Specific Gravity  1.015 1.005 - 1.030    Blood, UA Negative Negative    pH, Urine 5.5 5.0 - 8.0    Protein, POC Negative Negative mg/dL    Urobilinogen, UA Normal Normal    Leukocytes Trace (A) Negative    Nitrite, UA Negative Negative   KUB independent review    A KUB is available for me to review today.  The image is inspected for a bowel gas pattern and the general bone structure of the spine and pelvis. The kidneys are then inspected closely.  Renal outline is noted if identifiable. The kidney, collecting system, and anticipated path of the ureter are examined for calcifications including those in the true pelvis.  This film reveals:    On the right there are no calcificaitons seen in the kidney or the expected course of the ureter. .    On the left there are multiple renal stones.    Independent review of CT scan of the abdomen/pelvis The patient has undergone a CT scan of the abdomen and pelvis With contrast. The images are available for me to review as an independent interpretation for evaluation and management.  Assessment of the renal parenchyma with regards to thickness, scarring, symmetry in appearance and function, presence of masses both pre-and postcontrast, and calcifications are noted.  The collecting system with regard to dilatation, presence of calcifications, and masses were reviewed.  The course and caliber the ureters also noted.  The renal vessels and retroperitoneum is inspected for pathology.   The solid viscera and bowel pattern are briefly reviewed, but will also be inspected by the radiologist. The renal pelvis is inspected.  This study shows 3 mm right distal ureteral stone near the UVJ with mild hydronephrosis and perinephric and periureteral fat stranding.  As well as 3 small nonobstructing left renal stones.     Assessment and Plan    Diagnoses and all orders for this visit:    1. Right ureteral stone (Primary)    2. Kidney stone  -     POC Urinalysis Dipstick  -     XR Abdomen KUB; Future      46-year-old female established patient in for follow-up regarding recent ER visit on 5/16/2022 for complaints of right flank pain, nausea vomiting and hematuria.  At that time patient had a CT confirmed 3 mm right distal ureteral stone near the UVJ with mild hydronephrosis and perinephric and periureteral fat stranding.  As well as 3 small nonobstructing left renal stones. At this time patient's KUB no stone in the right distal ureter is seen.  Patient reports at this time that she passed her stone approximately 1 week ago and that she was able to collect the stone for us to send for evaluation although she has left the stone at home at this time.     At this time patient has passed her right ureteral stone that was causing her right flank pain.  Patient encouraged to bring the stone by whenever she is near our office to send for evaluation.  Patient to follow-up with Dr. Bañuelos in October for her annual visit.

## 2022-05-21 LAB
BACTERIA SPEC AEROBE CULT: NORMAL
BACTERIA SPEC AEROBE CULT: NORMAL

## 2022-05-24 ENCOUNTER — HOSPITAL ENCOUNTER (OUTPATIENT)
Dept: GENERAL RADIOLOGY | Facility: HOSPITAL | Age: 47
Discharge: HOME | End: 2022-05-24
Admitting: UROLOGY

## 2022-05-24 ENCOUNTER — OFFICE VISIT (OUTPATIENT)
Dept: UROLOGY | Facility: CLINIC | Age: 47
End: 2022-05-24

## 2022-05-24 VITALS — BODY MASS INDEX: 43.41 KG/M2 | WEIGHT: 245 LBS | HEIGHT: 63 IN

## 2022-05-24 DIAGNOSIS — N20.0 RENAL CALCULUS, LEFT: ICD-10-CM

## 2022-05-24 DIAGNOSIS — N20.0 KIDNEY STONE: ICD-10-CM

## 2022-05-24 DIAGNOSIS — N20.1 RIGHT URETERAL STONE: Primary | ICD-10-CM

## 2022-05-24 LAB
BILIRUB BLD-MCNC: NEGATIVE MG/DL
CLARITY, POC: CLEAR
COLOR UR: ABNORMAL
GLUCOSE UR STRIP-MCNC: NEGATIVE MG/DL
KETONES UR QL: NEGATIVE
LEUKOCYTE EST, POC: ABNORMAL
NITRITE UR-MCNC: NEGATIVE MG/ML
PH UR: 5.5 [PH] (ref 5–8)
PROT UR STRIP-MCNC: NEGATIVE MG/DL
RBC # UR STRIP: NEGATIVE /UL
SP GR UR: 1.01 (ref 1–1.03)
UROBILINOGEN UR QL: NORMAL

## 2022-05-24 PROCEDURE — 74018 RADEX ABDOMEN 1 VIEW: CPT

## 2022-05-24 PROCEDURE — 81002 URINALYSIS NONAUTO W/O SCOPE: CPT

## 2022-05-24 PROCEDURE — 99213 OFFICE O/P EST LOW 20 MIN: CPT

## 2022-07-27 ENCOUNTER — APPOINTMENT (OUTPATIENT)
Dept: LAB | Facility: HOSPITAL | Age: 47
End: 2022-07-27

## 2022-07-27 ENCOUNTER — LAB (OUTPATIENT)
Dept: LAB | Facility: HOSPITAL | Age: 47
End: 2022-07-27

## 2022-07-27 DIAGNOSIS — D72.829 LEUKOCYTOSIS, UNSPECIFIED TYPE: ICD-10-CM

## 2022-07-27 DIAGNOSIS — E61.1 IRON DEFICIENCY: ICD-10-CM

## 2022-07-27 LAB
BASOPHILS # BLD AUTO: 0.07 10*3/MM3 (ref 0–0.2)
BASOPHILS NFR BLD AUTO: 0.6 % (ref 0–1.5)
DEPRECATED RDW RBC AUTO: 42.8 FL (ref 37–54)
EOSINOPHIL # BLD AUTO: 0.25 10*3/MM3 (ref 0–0.4)
EOSINOPHIL NFR BLD AUTO: 2 % (ref 0.3–6.2)
ERYTHROCYTE [DISTWIDTH] IN BLOOD BY AUTOMATED COUNT: 13 % (ref 12.3–15.4)
FERRITIN SERPL-MCNC: 96.23 NG/ML (ref 13–150)
HCT VFR BLD AUTO: 40.9 % (ref 34–46.6)
HGB BLD-MCNC: 13.1 G/DL (ref 12–15.9)
IMM GRANULOCYTES # BLD AUTO: 0.1 10*3/MM3 (ref 0–0.05)
IMM GRANULOCYTES NFR BLD AUTO: 0.8 % (ref 0–0.5)
IRON 24H UR-MRATE: 72 MCG/DL (ref 37–145)
IRON SATN MFR SERPL: 17 % (ref 20–50)
LYMPHOCYTES # BLD AUTO: 3.77 10*3/MM3 (ref 0.7–3.1)
LYMPHOCYTES NFR BLD AUTO: 30.4 % (ref 19.6–45.3)
MCH RBC QN AUTO: 29 PG (ref 26.6–33)
MCHC RBC AUTO-ENTMCNC: 32 G/DL (ref 31.5–35.7)
MCV RBC AUTO: 90.7 FL (ref 79–97)
MONOCYTES # BLD AUTO: 0.48 10*3/MM3 (ref 0.1–0.9)
MONOCYTES NFR BLD AUTO: 3.9 % (ref 5–12)
NEUTROPHILS NFR BLD AUTO: 62.3 % (ref 42.7–76)
NEUTROPHILS NFR BLD AUTO: 7.74 10*3/MM3 (ref 1.7–7)
NRBC BLD AUTO-RTO: 0 /100 WBC (ref 0–0.2)
PLATELET # BLD AUTO: 287 10*3/MM3 (ref 140–450)
PMV BLD AUTO: 9.4 FL (ref 6–12)
RBC # BLD AUTO: 4.51 10*6/MM3 (ref 3.77–5.28)
TIBC SERPL-MCNC: 428 MCG/DL (ref 298–536)
TRANSFERRIN SERPL-MCNC: 287 MG/DL (ref 200–360)
WBC NRBC COR # BLD: 12.41 10*3/MM3 (ref 3.4–10.8)

## 2022-07-27 PROCEDURE — 83540 ASSAY OF IRON: CPT

## 2022-07-27 PROCEDURE — 82728 ASSAY OF FERRITIN: CPT

## 2022-07-27 PROCEDURE — 85025 COMPLETE CBC W/AUTO DIFF WBC: CPT

## 2022-07-27 PROCEDURE — 84466 ASSAY OF TRANSFERRIN: CPT

## 2022-07-27 PROCEDURE — 36415 COLL VENOUS BLD VENIPUNCTURE: CPT

## 2022-08-02 RX ORDER — FERROUS SULFATE 325(65) MG
325 TABLET ORAL
Qty: 30 TABLET | Refills: 3 | Status: CANCELLED | OUTPATIENT
Start: 2022-08-02

## 2022-08-03 ENCOUNTER — OFFICE VISIT (OUTPATIENT)
Dept: ONCOLOGY | Facility: CLINIC | Age: 47
End: 2022-08-03

## 2022-08-03 VITALS
TEMPERATURE: 97.6 F | WEIGHT: 252 LBS | DIASTOLIC BLOOD PRESSURE: 92 MMHG | BODY MASS INDEX: 44.65 KG/M2 | SYSTOLIC BLOOD PRESSURE: 138 MMHG | OXYGEN SATURATION: 97 % | HEART RATE: 89 BPM | HEIGHT: 63 IN | RESPIRATION RATE: 18 BRPM

## 2022-08-03 DIAGNOSIS — D72.829 LEUKOCYTOSIS, UNSPECIFIED TYPE: Primary | ICD-10-CM

## 2022-08-03 PROCEDURE — 99215 OFFICE O/P EST HI 40 MIN: CPT | Performed by: INTERNAL MEDICINE

## 2022-10-04 ENCOUNTER — HOSPITAL ENCOUNTER (OUTPATIENT)
Dept: GENERAL RADIOLOGY | Facility: HOSPITAL | Age: 47
Discharge: HOME | End: 2022-10-04

## 2022-10-04 ENCOUNTER — OFFICE VISIT (OUTPATIENT)
Dept: UROLOGY | Facility: CLINIC | Age: 47
End: 2022-10-04

## 2022-10-04 VITALS — BODY MASS INDEX: 42.07 KG/M2 | HEIGHT: 64 IN | WEIGHT: 246.4 LBS | TEMPERATURE: 96.8 F

## 2022-10-04 DIAGNOSIS — N20.0 RENAL CALCULUS, LEFT: Primary | ICD-10-CM

## 2022-10-04 DIAGNOSIS — N20.0 KIDNEY STONE: ICD-10-CM

## 2022-10-04 LAB
BILIRUB BLD-MCNC: NEGATIVE MG/DL
CLARITY, POC: CLEAR
COLOR UR: YELLOW
GLUCOSE UR STRIP-MCNC: NEGATIVE MG/DL
KETONES UR QL: NEGATIVE
LEUKOCYTE EST, POC: NEGATIVE
NITRITE UR-MCNC: NEGATIVE MG/ML
PH UR: 7 [PH] (ref 5–8)
PROT UR STRIP-MCNC: NEGATIVE MG/DL
RBC # UR STRIP: NEGATIVE /UL
SP GR UR: 1.02 (ref 1–1.03)
UROBILINOGEN UR QL: NORMAL

## 2022-10-04 PROCEDURE — 99213 OFFICE O/P EST LOW 20 MIN: CPT | Performed by: UROLOGY

## 2022-10-04 PROCEDURE — 74018 RADEX ABDOMEN 1 VIEW: CPT

## 2022-10-04 PROCEDURE — 81003 URINALYSIS AUTO W/O SCOPE: CPT | Performed by: UROLOGY

## 2022-10-31 ENCOUNTER — TRANSCRIBE ORDERS (OUTPATIENT)
Dept: ADMINISTRATIVE | Facility: HOSPITAL | Age: 47
End: 2022-10-31

## 2022-10-31 DIAGNOSIS — Z12.31 ENCOUNTER FOR SCREENING MAMMOGRAM FOR MALIGNANT NEOPLASM OF BREAST: Primary | ICD-10-CM

## 2022-11-23 ENCOUNTER — HOSPITAL ENCOUNTER (OUTPATIENT)
Dept: MAMMOGRAPHY | Facility: HOSPITAL | Age: 47
Discharge: HOME | End: 2022-11-23
Admitting: OBSTETRICS & GYNECOLOGY

## 2022-11-23 ENCOUNTER — LAB (OUTPATIENT)
Dept: LAB | Facility: HOSPITAL | Age: 47
End: 2022-11-23

## 2022-11-23 DIAGNOSIS — Z12.31 ENCOUNTER FOR SCREENING MAMMOGRAM FOR MALIGNANT NEOPLASM OF BREAST: ICD-10-CM

## 2022-11-23 DIAGNOSIS — D72.829 LEUKOCYTOSIS, UNSPECIFIED TYPE: ICD-10-CM

## 2022-11-23 LAB
BASOPHILS # BLD AUTO: 0.09 10*3/MM3 (ref 0–0.2)
BASOPHILS NFR BLD AUTO: 0.7 % (ref 0–1.5)
DEPRECATED RDW RBC AUTO: 42.5 FL (ref 37–54)
EOSINOPHIL # BLD AUTO: 0.25 10*3/MM3 (ref 0–0.4)
EOSINOPHIL NFR BLD AUTO: 2 % (ref 0.3–6.2)
ERYTHROCYTE [DISTWIDTH] IN BLOOD BY AUTOMATED COUNT: 13 % (ref 12.3–15.4)
FERRITIN SERPL-MCNC: 123.2 NG/ML (ref 13–150)
HCT VFR BLD AUTO: 41 % (ref 34–46.6)
HGB BLD-MCNC: 12.9 G/DL (ref 12–15.9)
IMM GRANULOCYTES # BLD AUTO: 0.12 10*3/MM3 (ref 0–0.05)
IMM GRANULOCYTES NFR BLD AUTO: 0.9 % (ref 0–0.5)
IRON 24H UR-MRATE: 59 MCG/DL (ref 37–145)
IRON SATN MFR SERPL: 15 % (ref 20–50)
LYMPHOCYTES # BLD AUTO: 3.89 10*3/MM3 (ref 0.7–3.1)
LYMPHOCYTES NFR BLD AUTO: 30.6 % (ref 19.6–45.3)
MCH RBC QN AUTO: 28.2 PG (ref 26.6–33)
MCHC RBC AUTO-ENTMCNC: 31.5 G/DL (ref 31.5–35.7)
MCV RBC AUTO: 89.7 FL (ref 79–97)
MONOCYTES # BLD AUTO: 0.69 10*3/MM3 (ref 0.1–0.9)
MONOCYTES NFR BLD AUTO: 5.4 % (ref 5–12)
NEUTROPHILS NFR BLD AUTO: 60.4 % (ref 42.7–76)
NEUTROPHILS NFR BLD AUTO: 7.67 10*3/MM3 (ref 1.7–7)
NRBC BLD AUTO-RTO: 0 /100 WBC (ref 0–0.2)
PLATELET # BLD AUTO: 295 10*3/MM3 (ref 140–450)
PMV BLD AUTO: 9.3 FL (ref 6–12)
RBC # BLD AUTO: 4.57 10*6/MM3 (ref 3.77–5.28)
TIBC SERPL-MCNC: 402 MCG/DL (ref 298–536)
TRANSFERRIN SERPL-MCNC: 270 MG/DL (ref 200–360)
WBC NRBC COR # BLD: 12.71 10*3/MM3 (ref 3.4–10.8)

## 2022-11-23 PROCEDURE — 85025 COMPLETE CBC W/AUTO DIFF WBC: CPT

## 2022-11-23 PROCEDURE — 77067 SCR MAMMO BI INCL CAD: CPT

## 2022-11-23 PROCEDURE — 36415 COLL VENOUS BLD VENIPUNCTURE: CPT

## 2022-11-23 PROCEDURE — 83540 ASSAY OF IRON: CPT

## 2022-11-23 PROCEDURE — 77063 BREAST TOMOSYNTHESIS BI: CPT

## 2022-11-23 PROCEDURE — 84466 ASSAY OF TRANSFERRIN: CPT

## 2022-11-23 PROCEDURE — 82728 ASSAY OF FERRITIN: CPT

## 2022-11-24 NOTE — PROGRESS NOTES
[facility] GROUP HEMATOLOGY AND ONCOLOGY  [address]  [facility] [address] [phone]    Patient Name: Breonna Bellamy  Encounter Date: 11/30/2022  YOB: 1975  Patient Number: [id number]       REASON FOR VISIT: Breonna Bellamy is a 47-year-old female who returns in follow-up of leukocytosis and iron deficiency. She was on oral iron from 12/10/2018 through 03/01/2021. She is here alone.     I have reviewed the HPI and verified with the patient the accuracy of it. No changes to interval history since the information was documented. Herbie Wharton MD 11/30/22       DIAGNOSTIC ABNORMALITIES:   1. Review of labs made available from the referring office date back to 06/13/2017. At that time, hemoglobin 12.5, hematocrit 37.8, MCV 82, platelets 359,000, WBC 13.5 with 66 segs, 27 lymphocytes, 5 monocytes, 2 eosinophils (ANC 8.9 and ALC 3.6). CMP was normal with a GFR of 91, calcium 9.4, total protein 7.0, normal liver enzymes. TSH 1.070 (0.45 - 4.5), T4 of 9.3 (4.5 - 12).   2. On 10/24/2018, hemoglobin 13, hematocrit 38.8, MCV 85, platelets 368,000, WBC 12.4 with 65 segs, 29 lymphocytes, 4 monocytes, 2 eosinophils (normal differential). CMP again normal with a BUN of 14, creatinine 0.76, GFR 97, calcium 9.4, total protein 7.1, and normal liver enzymes. Serum iron 60, iron saturation 14%, TIBC 434, ferritin 28 (depressed), B12 of 1336, TSH 1.19, T4 of 8.6, T3 of 140 (each within reference range).  3. Labs, 11/14/2018: Hemoglobin 13.4, hematocrit 41.5, MCV 88.4, platelets 367,000, WBC 12.7 with 61.3 segs (ANC 7.8),31.3 lymphocytes (ALC 4.0), and 7.4 mid cells. CMP is notable for a glucose of 111 otherwise normal with a BUN of 14, creatinine 0.7 (GFR 97.5), calcium 9.0, total protein 7.1, and normal liver enzymes.  (313 - 618). Beta 2 microglobulin 2.24. Serum iron 46, saturation 8.78, ferritin 17% (each depressed), B12 of 1455, folate 9.5,  TIBC 524 (each elevated). Urinalysis negative.   4. Comprehensive blood report, 11/14/2018. Mild Leukocytosis, Favor Reactive. COMPREHENSIVE DIAGNOSIS. Peripheral blood: Mild leukocytosis with a normal relative differential. COMPREHENSIVE COMMENT. The findings in the peripheral blood favor a reactive cause for the patient's mild leukocytosis. FISH for BCR/ABL and molecular studies for JAK2 V617F and calreticulin mutations were performed for further evaluation and are negative.  5. Chest x-ray revealed no acute cardiopulmonary disease.   6. FIDEL, 12/04/2018. Negative  7. Bone marrow, left iliac crest, smears, clot, and core biopsy, 02/11/2019: Normocellular marrow with maturing trilineage hematopoiesis. Normal female chromosome complement. Peripheral blood: Mild leukocytosis. Microscopic diagnosis: 1. Evaluation of the bone marrow biopsy shows a normocellular marrow for the patient's age estimated at 50%. There appears to be maturing trilineage hematopoiesis. There is no increase in blasts population. Megakaryocytes are present and adequate in number with no distinct cytologic atypia. No atypical lymphocytes or plasma cells are seen. 2. Peripheral blood smear shows a mild leukocytosis with a normal differential count. There is no left shift. Circulating blasts are not identified. Red blood cells display normocytic and normochromic indices. Platelets are present in adequate numbers and display normal morphology.    PREVIOUS INTERVENTIONS:   1. Ferrous sulfate 325 p.o. daily beginning 11/14/2018 through 03/01/2021.        Problem List Items Addressed This Visit    None    Oncology/Hematology History    No history exists.       PAST MEDICAL HISTORY:  ALLERGIES:  Allergies   Allergen Reactions   • Duexis [Ibuprofen-Famotidine] Anaphylaxis     Buffer in medication is what allergic to   • Famotidine Anaphylaxis   • Other Swelling     AVOCADO- EAR AND THROAT SWELLING   • Sulfa Antibiotics Anaphylaxis   • Fish-Derived  Products Hives and Nausea And Vomiting   • Avocado Hives and Swelling   • Procardia [Nifedipine] Swelling and Rash     CURRENT MEDICATIONS:  Outpatient Encounter Medications as of 2022   Medication Sig Dispense Refill   • albuterol (PROVENTIL HFA;VENTOLIN HFA) 108 (90 Base) MCG/ACT inhaler Inhale 2 puffs Every 4 (Four) Hours As Needed for Wheezing.     • busPIRone (BUSPAR) 10 MG tablet Take 10 mg by mouth As Needed.     • Calcium Acetate, Phos Binder, (CALCIUM ACETATE PO) Take  by mouth.     • cholecalciferol (VITAMIN D3) 1000 units tablet Take 1,000 Units by mouth Daily.     • dexlansoprazole (DEXILANT) 60 MG capsule Take 60 mg by mouth Daily.     • dicyclomine (BENTYL) 20 MG tablet Take 40 mg by mouth every night.     • levocetirizine (XYZAL) 5 MG tablet      • ondansetron ODT (ZOFRAN-ODT) 4 MG disintegrating tablet Place 1 tablet on the tongue Every 6 (Six) Hours As Needed for Nausea. 12 tablet 0   • pantoprazole (PROTONIX) 40 MG EC tablet      • propranolol (INDERAL) 80 MG tablet Take 1 tablet by mouth 3 (Three) Times a Day. 180 tablet 5   • sucralfate (CARAFATE) 1 G tablet Take 1 g by mouth as needed.     • tamsulosin (FLOMAX) 0.4 MG capsule 24 hr capsule Take 1 capsule by mouth Daily. 30 capsule 0   • [DISCONTINUED] colesevelam (WELCHOL) 625 MG tablet Take 1,875 mg by mouth as needed.       No facility-administered encounter medications on file as of 2022.     ADULT ILLNESSES:   Leukocytosis (disorder) ( ICD-10:D72.829 ;Elevated white blood cell count, unspecified   Fatigue (finding) ( ICD-10:R53.83 ;Other fatigue   Heart palpitations ( ; ICD-10:R00.2 ;Palpitations )   Hypertension ( ICD-10:I10 ;Essential (primary) hypertension   Iron deficiency ( ICD-10:E61.1 ;Iron deficiency   Irritable bowel syndrome ( ICD-10:K58.9 ;Irritable bowel syndrome without diarrhea   Miscarriage ( multiple; ICD-10:O03.9 ;Complete or unspecified spontaneous  without complication )   Polycystic ovarian  "syndrome ( ICD-10:E28.2 ;Polycystic ovarian syndrome/endometriosis   Asthma, 2018   PTSD, 2015   Stomach ulcers   Miscarriages   IBS   Avulsion fracture right hip last 12/2019.  Says \"they found a benign tumor in my right femur.\"  Has been by orthopedic oncology at [place] - Dr. Genevieve Guallpa, 06/10/2020 who reviewed an MRI and feels that the lesion is consistent with a fibroma.  They want to repeat an MRI in a month.Left carpal tunnel syndrome      SURGERIES:   Partial hysterectomy (procedure) no ovariectomies for endometriosis, 2016   Nephrolithotomy for removal of calculus, (extraction of kidney stone), 2002 and 2015   Laparoscopic laser destruction of endometriosis, 2011 and 2014   Cholecystectomy   EGD for esophageal dilation, 01/2018. Dr. Grande   Colonoscopy, 2016.   Left carpal tunnel release, 11/12/2019.  Dr. Yu  C-scope 06/19/2019.  Postoperative diagnosis: Normal terminal ileum.  Normal cecum, normal appendiceal orifice and ileocecal valve.  2 small 2 to 3 mm sized polyps.  Random biopsies in the right and left hemicolon performed to rule out microscopic otitis.  Internal hemorrhoids in the rectum.  Plan: Continue WelChol and Bentyl.  Start daily fiber supplement.  Repeat colonoscopy pending biopsy results.  03/11/2021-EGD.  Gastritis biopsy performed.  Small bowel biopsies performed to rule out celiac disease.  Esophageal gastric inlet patch biopsy performed to rule out celiac disease.  Diagnosis: A.small bowel, biopsies: Benign small bowel mucosa with no significant histopathologic abnormality.  B.gastric biopsy: Benign gastric mucosa with mild reactive changes.  C.esophagus, biopsy, esophageal-gastric inlet patch biopsy: Interface of glandular and squamous mucosa with changes of reflux esophagitis.  No intestinal metaplasia or dysplasia identified.  03/01/2022- right L5--S1 microdiscectomy        ADULT ILLNESSES:  Patient Active Problem List   Diagnosis Code   • Endometriosis N80.9   • " Laryngopharyngeal reflux K21.9   • Pharyngoesophageal dysphagia R13.14   • Morbid obesity with BMI of 40.0-44.9, adult (HCC) E66.01, Z68.41   • Leukocytosis D72.829   • Iron deficiency E61.1   • Asthma J45.909   • HTN (hypertension) I10   • Lesion of left femur M89.9   • Kidney stone N20.0   • Palpitations R00.2   • S/P lumbar microdiscectomy Z98.890     SURGERIES:  Past Surgical History:   Procedure Laterality Date   • BACK SURGERY     • BONE MARROW BIOPSY     • CHOLECYSTECTOMY     • COLONOSCOPY     • CYSTOSCOPY N/A 09/19/2016    Procedure: CYSTOSCOPY;  Surgeon: Jonathan Figueroa MD;  Location: [facility] OR;  Service:    • CYSTOSCOPY ELECTROHYDRAULIC LITHOTRIPSY     • EYE SURGERY      LASIK   • HYSTEROSCOPY ENDOMETRIAL ABLATION      X2   • KIDNEY STONE SURGERY      removed   • SALPINGECTOMY Bilateral    • SKIN BIOPSY     • TOTAL LAPAROSCOPIC HYSTERECTOMY N/A 09/19/2016    Procedure: TOTAL LAPAROSCOPIC HYSTERECTOMY WITH DAVINCI SI ROBOT WITH CYSTO;  Surgeon: Jonathan Figueroa MD;  Location: [facility] OR;  Service:      HEALTH MAINTENANCE ITEMS:  Health Maintenance Due   Topic Date Due   • COLORECTAL CANCER SCREENING  Never done   • COVID-19 Vaccine (1) Never done   • Pneumococcal Vaccine 0-64 (1 - PCV) Never done   • TDAP/TD VACCINES (1 - Tdap) Never done   • HEPATITIS C SCREENING  Never done   • PAP SMEAR  Never done   • ANNUAL PHYSICAL  10/17/2019   • INFLUENZA VACCINE  Never done       <no information>  Last Completed Colonoscopy     This patient has no relevant Health Maintenance data.        There is no immunization history for the selected administration types on file for this patient.  Last Completed Mammogram     This patient has no relevant Health Maintenance data.            FAMILY HISTORY:  Family History   Problem Relation Age of Onset   • Anesthesia problems Mother    • Hypertension Mother    • Anesthesia problems Maternal Grandmother    • Hypertension Father    • Arrhythmia Father    • Colon cancer Paternal  "Grandmother    • Pancreatic cancer Paternal Grandmother    • Liver cancer Paternal Grandfather    • Heart attack Paternal Grandfather    • Ovarian cancer Neg Hx    • Breast cancer Neg Hx    • Uterine cancer Neg Hx      SOCIAL HISTORY:  Social History     Socioeconomic History   • Marital status:    Tobacco Use   • Smoking status: Never   • Smokeless tobacco: Never   Vaping Use   • Vaping Use: Never used   Substance and Sexual Activity   • Alcohol use: No     Comment: occasional   • Drug use: No   • Sexual activity: Yes     Partners: Male     Birth control/protection: None       REVIEW OF SYSTEMS:  Constitutional:   The patient's appetite is good and energy is improved. Is now working as a , putting in 12 hour days.  She has lost 12 lb (in addition to 3 lb at her prior visit) since her last visit.  \"Smaller portions.\" She has no fevers, chills, or drenching night sweats. Her sleep habits seem appropriate.  Has received # 2 COVID vaccination last 02/17/2021.  Ear/Nose/Throat/Mouth:   She reports no ear pains, with seasonal sinus symptoms, \"that time of year\" but no sore throat, nosebleeds, or sore tongue. She has no headaches. She denies any hoarseness, change in voice quality.   Ocular:   She reports no eye pain, significant change in visual acuity, double vision, or blurry vision.  Respiratory:   She reports baseline exertional dyspnea but is not short of breath with her routine activities. Says she has asthma and uses albuterol inhaler as needed, less since use of Protonix.  She has no chronic cough, significant shortness of breathing at rest, phlegm production, or unexplained chest wall pain.  Cardiovascular:   She reports no recurrent non exertional chest pain, chest pressure, nor chest heaviness on PPI. Remains on Inderal. She reports no claudication. She reports fewer palpitations but no symptomatic orthostasis. Says that she had a exercise stress test last 09/2018. \"All " "ok.\"  Gastrointestinal:   She reports much improved epigastric/RUQ pains no recurrent hematemesis, last 02/20/2021.  Says EGD (sometime 03/2021 by Dr. Grande) showed \"healing ulcer in the stomach.\"  Has since been on PPI, dexilant and Bentyl daily and as needed Carafate.  Reports much improved dysphagia (had EGD with dilation).  She currently has no nausea, or vomiting. She has fewer bouts of postprandial abdominal pain, bloating, but no worsening cramping, chronic change in bowel habits, with no dark (off oral iron due to GI intolerance- cramps and diarrhea) discoloration of the stool. She reports no overt rectal bleeding. She reports chronic alternating constipation and soft stools without overt diarrhea from IBS. Had a colonoscopy, 06/2019. \"2 polyps.\" Dr. Grande follows. Liver US ordered for 05/06/2022 at [facility] reportedly showed fatty liver.  Genitourinary:   She reports no urinary burning, frequency, dribbling, or discoloration. She reports difficulty controlling her bladder. Uses pads. She has no need to urinate frequently through the night. She passes kidney stones \"just about every other month,\" sometimes associated with hematuria.  Most recently hospitalized in 05/16/2022.  \"I passed it.\" Is followed by Dr. Bañuelos for urology.  Musculoskeletal:   She reports resolution of right hip pains since avulsion fracture, 12/25/2020.  Has been seen by ortho at [place].  On 03/01/2022 underwent right L5-S1 microdiscectomy which she says has significantly relieved her back pain.  She also says that the chronic (poly) arthralgias of the neck, shoulders, feet/toes with myalgias, and nighttime leg cramping are all improved as well.  The left wrist is \"much better\" since the carpal tunnel surgery last 11/12/2019.  Extremities:   She reports no trouble with fluid retention or significant leg swelling.  Endocrine:   She reports no problems with excess thirst, excessive urination, vasomotor instability.  Heme/Lymphatic:   She " "reports no unexplained bleeding, bruising, petechial rashes, or swollen glands.  Skin:   She reports no itching, rashes, or lesions which won't heal.  Neuro:   She reports no loss of consciousness, seizures, fainting spells, or dizziness. She reports no weakness of face, arms, or legs. She has no difficulty with speech. She has no tremors. She no longer has paresthesias of the fingers on the left hand that radiated to the left elbow since surgery last 11/2019.  Psych:   She denies depression but admits to anxiety. She reports no mood swings.       VITAL SIGNS: /82   Pulse 65   Temp 97 °F (36.1 °C)   Resp 18   Ht 161.3 cm (63.5\")   Wt 109 kg (240 lb 9.6 oz)   LMP 09/13/2016 Comment: negative hcg noted to chart  SpO2 98%   BMI 41.95 kg/m² Body surface area is 2.1 meters squared.  Pain Score    11/30/22 0941   PainSc: 0-No pain         PHYSICAL EXAMINATION:   General:   She is a pleasant, cooperative, (less) anxious, (less) obese, modestly-kept female who is comfortable at rest. She arrived in the exam room ambulatory with a cane. She appears to be her stated age. Her skin color is normal. She is here alone  Head/Neck:   The patient is anicteric and atraumatic. She is wearing a surgical mask today.  Mouth/oropharynx are clear.  Mild tonsillar hypertrophy without exudates nor hyperemia.  The trachea is midline. The neck is supple without evidence of cervical nor supraclavicular adenopathy.   Eyes:   The pupils are equal, round, and reactive to light. The extraocular movements are full. There is no scleral jaundice or erythema.   Chest:   The respiratory efforts are normal and unhindered. The chest is clear to auscultation and percussion. There are no wheezes, rhonchi, rales, or asymmetry of breath sounds.  Cardiovascular:   The patient has a regular cardiac rate and rhythm without murmurs, rubs, or gallops. The peripheral pulses are equal and full.  Abdomen:   The belly is soft and obese. There is no " rebound or guarding. There is no organomegaly, mass-effect, or tenderness. Bowel sounds are active and of normal character.  Extremities:   There is no evidence of cyanosis, clubbing, or edema.   Rheumatologic:   There is no overt evidence of rheumatoid deformities of the hands. There is no sausaging of the fingers. There is no sign of active synovitis. The gait is slow and independent, no longer favors the right hip and no longer cane supported.  Cutaneous:   There are no overt rashes, disseminated lesions, purpura, or petechiae.   Lymphatics:   There is no evidence of adenopathy in the cervical, supraclavicular, axillary areas.  Neurologic:   The patient is alert, oriented, cooperative, and pleasant. She is appropriately conversant. She ambulated into the exam room without assistance and transferred from chair to exam table unaided. There is no overt dysfunction of the motor, sensory, cerebellar systems.  Psych:   Mood and affect are appropriate for circumstance. Eye contact is appropriate. Normal judgement and decision making.         LABS    Lab Results - Last 18 Months   Lab Units 11/23/22  0943 07/27/22  0905 05/16/22  1345 04/26/22  1127 01/25/22  1453 10/26/21  1517 06/29/21  1110   HEMOGLOBIN g/dL 12.9 13.1 14.2 13.3 13.6 12.8 13.0   HEMATOCRIT % 41.0 40.9 42.3 41.1 42.4 37.8 39.0   MCV fL 89.7 90.7 87.0 89.5 90.2 87.1 86.3   WBC 10*3/mm3 12.71* 12.41* 19.67* 13.18* 17.30* 11.83* 10.84*   RDW % 13.0 13.0 12.7 12.7 12.8 13.1 13.0   MPV fL 9.3 9.4 9.5 9.6 9.3 9.8 9.3   PLATELETS 10*3/mm3 295 287 287 287 293 271 248   IMM GRAN % % 0.9* 0.8* 0.9*  --  1.2* 0.6* 0.6*   NEUTROS ABS 10*3/mm3 7.67* 7.74* 15.87* 7.51* 10.90* 6.89 6.51   LYMPHS ABS 10*3/mm3 3.89* 3.77* 2.51  --  5.11* 3.92* 3.45*   MONOS ABS 10*3/mm3 0.69 0.48 0.82  --  0.75 0.59 0.52   EOS ABS 10*3/mm3 0.25 0.25 0.24 0.40 0.26 0.30 0.24   BASOS ABS 10*3/mm3 0.09 0.07 0.06  --  0.08 0.06 0.06   IMMATURE GRANS (ABS) 10*3/mm3 0.12* 0.10* 0.17*  --   0.20* 0.07* 0.06*   NRBC /100 WBC 0.0 0.0 0.0  --  0.0 0.0 0.0   NEUTROPHIL % %  --   --   --  57.0  --   --   --    MONOCYTES % %  --   --   --  7.0  --   --   --    ATYP LYMPH % %  --   --   --  9.0*  --   --   --        Lab Results - Last 18 Months   Lab Units 05/16/22  1345 04/26/22  1127 03/02/22  0047 03/01/22  0623 02/22/22  1558 10/26/21  1517   GLUCOSE mg/dL 121*  --   --   --   --   --    SODIUM mmol/L 137  --  139 141 138  --    POTASSIUM mmol/L 4.3  --  4.6 4.3 4.1  --    TOTAL CO2 mmol/L  --   --  20* 21* 25  --    CO2 mmol/L 23.0  --   --   --   --   --    CHLORIDE mmol/L 103  --  106 108* 106  --    ANION GAP mmol/L 11.0  --  13 12 7  --    CREATININE mg/dL 0.87  --  1.04 0.77 0.85  --    BUN mg/dL 12  --  9 10 12  --    BUN / CREAT RATIO  13.8  --   --   --   --   --    CALCIUM mg/dL 8.9  --  8.0* 9.0 9.5  --    ALK PHOS U/L 84 74  --   --   --  76   TOTAL PROTEIN g/dL 7.1 7.0  --   --   --  6.9   ALT (SGPT) U/L 22 32  --   --   --  28   AST (SGOT) U/L 24 40*  --   --   --  19   BILIRUBIN mg/dL 0.4 0.3  --   --   --  0.2   ALBUMIN g/dL 3.90 3.90  --   --   --  3.60   GLOBULIN gm/dL 3.2  --   --   --   --   --        Lab Results - Last 18 Months   Lab Units 01/25/22  1452   REFERENCE LAB REPORT  See Attached Report       Lab Results - Last 18 Months   Lab Units 11/23/22  0943 07/27/22  0905 04/26/22  1127 01/25/22  1453 10/26/21  1517 06/29/21  1110   IRON mcg/dL 59 72 79 82 63 78   TIBC mcg/dL 402 428 454 489 432 384   IRON SATURATION % 15* 17* 17* 17* 15* 20   FERRITIN ng/mL 123.20 96.23 102.70 102.20 104.50 103.10       ASSESSMENT:   1.  Leukocytosis, neutrophilic.   --WBC 12.71; ANC 7.67, 11/23/2022 (prior range: WBC 8.74- 17.3; ANC 4.93- 10.9). Evidence indicates a reactive etiology.   --01/27/2022-peripheral blood flow cytometry: No evidence of abnormal myeloid maturation or increased blast population.  No evidence for a lymphoproliferative disorder.    2.  Iron deficiency (borderline) without  "anemia. Intolerant of oral iron (cramps, soft stools)  3.  Chronic kidney stones followed by urology.    --ER visit, 05/16/2022 for right flank pain nausea vomiting and hematuria.  --05/16/2022- CT abdomen/pelvis.  3 mm distal right ureteral calculus causing mild right hydroureteronephrosis.  Pronounced right perinephric and periureteral fat stranding and ill-defined fluid.  Recommend clinical correlation to exclude superimposed infection.  Multiple nonobstructing left renal calculi.  Hepatic steatosis.  -- 05/24/2022-seen by urology.  Patient reports passing a kidney stone.  KUB with no stone in the right distal ureter.  Follow-up with Dr. Bañuelos in October for annual visit.  --10/04/2022- Follow-up urology for kidney stones.  Hydration recommended.  Avoid large amounts of tea, cocoa, cola drinks, and fruit juice,     4.  History of gastric ulcer   --Prior history of hemoptysis.    --History of single episode of hematemesis, \"multiple napkins\" last 02/20/2021 which awoke her from her sleep at 3 am.    --03/11/2021-EGD.  Gastritis biopsy performed.  Small bowel biopsies performed to rule out celiac disease.  Esophageal gastric inlet patch biopsy performed to rule out celiac disease.  Diagnosis: A.small bowel, biopsies: Benign small bowel mucosa with no significant histopathologic abnormality.  B.gastric biopsy: Benign gastric mucosa with mild reactive changes.  C.esophagus, biopsy, esophageal-gastric inlet patch biopsy: Interface of glandular and squamous mucosa with changes of reflux esophagitis.  No intestinal metaplasia or dysplasia identified.  --Remains on PPI, carafate, bentyl and dexilant    5.  Irritable bowel syndrome.   --Last c-scope, 06/19/2019. Normal terminal ileum.  Normal cecum, normal appendiceal orifice and ileocecal valve.  2 small 2 to 3 mm sized polyps.  Random biopsies in the right and left hemicolon performed to rule out microscopic otitis.  Internal hemorrhoids in the rectum.  Plan: Continue " WelChol and Bentyl.  Start daily fiber supplement.  Repeat colonoscopy pending biopsy results.  6.  Polycystic ovarian syndrome.   7.  Asthma.  Quiescent  8.  Migratory polyarthralgias, NOS. Fibromyalgia?   9.  Chronic right hip pain. Improved/resolved since surgery (below)  --01/21/2022 --Received a spinal epidural injection [place].  Subjectively no improvement in symptoms.  --12/23/2022-Lumbar MRI--degenerative changes of the lower 2 lumbar levels with a moderate right L5-S1 paracentral disc protrusion resulting in effacement of the right lateral recess and likely compression of the descending right S1 nerve root.  -- 03/01/2022- right L5-S1 microdiscectomy          10.  Fatty liver      RECOMMENDATIONS:   1.  Apprised of the labs on 11/23/2022 with persistent low-grade neutrophilic leukocytosis otherwise normal CBC.  Repleted serum iron (59; from 72; 79; 82; 63), slightly depressed iron saturation (15%; prior: 15%-24%), repleted ferritin (126; prior:  ).  2.  Previously apprised of peripheral blood flow cytometry, 01/27/2022 (above).  No evidence for abnormal myeloid maturation, increased blast population or lymphoproliferative disorder.  3. Previously apprised of the bone marrow biopsy, 02/11/2019 (above). Unrevealing.   4. Previously discussed the comprehensive blood report, 11/14 (above). Reactive leukocytosis favored. Negative JAK2 mutation.     5. Stay off ferrous sulfate - intolerant    6.  Continue Protonix, carafate, dexilant, Bentyl and other currently identified medications.   7.  Continue ongoing management per primary care and other specialists.   8.  Return to the office in 16 weeks with pre-office CBC and differential serum iron, Fe sat, ferritin.       I spent 33 minutes caring for Breonna on this date of service. This time includes time spent by me in the following activities: preparing for the visit, reviewing tests, performing a medically appropriate examination and/or evaluation,  counseling and educating the patient/family/caregiver, ordering medications, tests, or procedures and documenting information in the medical record.            cc: Antoine Hernandez, DO

## 2022-11-30 ENCOUNTER — OFFICE VISIT (OUTPATIENT)
Dept: ONCOLOGY | Facility: CLINIC | Age: 47
End: 2022-11-30

## 2022-11-30 VITALS
SYSTOLIC BLOOD PRESSURE: 138 MMHG | DIASTOLIC BLOOD PRESSURE: 82 MMHG | WEIGHT: 240.6 LBS | HEIGHT: 64 IN | TEMPERATURE: 97 F | BODY MASS INDEX: 41.07 KG/M2 | OXYGEN SATURATION: 98 % | RESPIRATION RATE: 18 BRPM | HEART RATE: 65 BPM

## 2022-11-30 DIAGNOSIS — D72.829 LEUKOCYTOSIS, UNSPECIFIED TYPE: Primary | ICD-10-CM

## 2022-11-30 PROCEDURE — 99214 OFFICE O/P EST MOD 30 MIN: CPT | Performed by: INTERNAL MEDICINE

## 2023-03-19 NOTE — PROGRESS NOTES
[facility] GROUP HEMATOLOGY AND ONCOLOGY  [address]  [facility] [address] [phone]    Patient Name: Breonna Bellamy  Encounter Date: 03/29/2023  YOB: 1975  Patient Number: [id number]       REASON FOR VISIT: Breonna Bellamy is a 47-year-old female who returns in follow-up of leukocytosis and iron deficiency. She was on oral iron from 12/10/2018 through 03/01/2021. She is here alone.     I have reviewed the HPI and verified with the patient the accuracy of it. No changes to interval history since the information was documented. Herbie Wharton MD 03/29/23     DIAGNOSTIC ABNORMALITIES:   1. Review of labs made available from the referring office date back to 06/13/2017. At that time, hemoglobin 12.5, hematocrit 37.8, MCV 82, platelets 359,000, WBC 13.5 with 66 segs, 27 lymphocytes, 5 monocytes, 2 eosinophils (ANC 8.9 and ALC 3.6). CMP was normal with a GFR of 91, calcium 9.4, total protein 7.0, normal liver enzymes. TSH 1.070 (0.45 - 4.5), T4 of 9.3 (4.5 - 12).   2. On 10/24/2018, hemoglobin 13, hematocrit 38.8, MCV 85, platelets 368,000, WBC 12.4 with 65 segs, 29 lymphocytes, 4 monocytes, 2 eosinophils (normal differential). CMP again normal with a BUN of 14, creatinine 0.76, GFR 97, calcium 9.4, total protein 7.1, and normal liver enzymes. Serum iron 60, iron saturation 14%, TIBC 434, ferritin 28 (depressed), B12 of 1336, TSH 1.19, T4 of 8.6, T3 of 140 (each within reference range).  3. Labs, 11/14/2018: Hemoglobin 13.4, hematocrit 41.5, MCV 88.4, platelets 367,000, WBC 12.7 with 61.3 segs (ANC 7.8),31.3 lymphocytes (ALC 4.0), and 7.4 mid cells. CMP is notable for a glucose of 111 otherwise normal with a BUN of 14, creatinine 0.7 (GFR 97.5), calcium 9.0, total protein 7.1, and normal liver enzymes.  (313 - 618). Beta 2 microglobulin 2.24. Serum iron 46, saturation 8.78, ferritin 17% (each depressed), B12 of 1455, folate 9.5,  TIBC 524 (each elevated). Urinalysis negative.   4. Comprehensive blood report, 11/14/2018. Mild Leukocytosis, Favor Reactive. COMPREHENSIVE DIAGNOSIS. Peripheral blood: Mild leukocytosis with a normal relative differential. COMPREHENSIVE COMMENT. The findings in the peripheral blood favor a reactive cause for the patient's mild leukocytosis. FISH for BCR/ABL and molecular studies for JAK2 V617F and calreticulin mutations were performed for further evaluation and are negative.  5. Chest x-ray revealed no acute cardiopulmonary disease.   6. FIDEL, 12/04/2018. Negative  7. Bone marrow, left iliac crest, smears, clot, and core biopsy, 02/11/2019: Normocellular marrow with maturing trilineage hematopoiesis. Normal female chromosome complement. Peripheral blood: Mild leukocytosis. Microscopic diagnosis: 1. Evaluation of the bone marrow biopsy shows a normocellular marrow for the patient's age estimated at 50%. There appears to be maturing trilineage hematopoiesis. There is no increase in blasts population. Megakaryocytes are present and adequate in number with no distinct cytologic atypia. No atypical lymphocytes or plasma cells are seen. 2. Peripheral blood smear shows a mild leukocytosis with a normal differential count. There is no left shift. Circulating blasts are not identified. Red blood cells display normocytic and normochromic indices. Platelets are present in adequate numbers and display normal morphology.    PREVIOUS INTERVENTIONS:   1. Ferrous sulfate 325 p.o. daily beginning 11/14/2018 through 03/01/2021.        Problem List Items Addressed This Visit        Other    Leukocytosis - Primary    Relevant Orders    CBC & Differential    Ferritin    Iron Profile     Oncology/Hematology History    No history exists.       PAST MEDICAL HISTORY:  ALLERGIES:  Allergies   Allergen Reactions   • Duexis [Ibuprofen-Famotidine] Anaphylaxis     Buffer in medication is what allergic to   • Famotidine Anaphylaxis   • Other  Swelling     AVOCADO- EAR AND THROAT SWELLING   • Sulfa Antibiotics Anaphylaxis   • Fish-Derived Products Hives and Nausea And Vomiting   • Avocado Hives and Swelling   • Procardia [Nifedipine] Swelling and Rash     CURRENT MEDICATIONS:  Outpatient Encounter Medications as of 3/29/2023   Medication Sig Dispense Refill   • albuterol (PROVENTIL HFA;VENTOLIN HFA) 108 (90 Base) MCG/ACT inhaler Inhale 2 puffs Every 4 (Four) Hours As Needed for Wheezing.     • busPIRone (BUSPAR) 10 MG tablet Take 1 tablet by mouth As Needed.     • Calcium Acetate, Phos Binder, (CALCIUM ACETATE PO) Take  by mouth.     • cholecalciferol (VITAMIN D3) 1000 units tablet Take 1 tablet by mouth Daily.     • dexlansoprazole (DEXILANT) 60 MG capsule Take 1 capsule by mouth Daily.     • dicyclomine (BENTYL) 20 MG tablet Take 2 tablets by mouth Every Night.     • levocetirizine (XYZAL) 5 MG tablet      • ondansetron ODT (ZOFRAN-ODT) 4 MG disintegrating tablet Place 1 tablet on the tongue Every 6 (Six) Hours As Needed for Nausea. 12 tablet 0   • pantoprazole (PROTONIX) 40 MG EC tablet      • propranolol (INDERAL) 80 MG tablet Take 1 tablet by mouth 3 (Three) Times a Day. 180 tablet 5   • sucralfate (CARAFATE) 1 G tablet Take 1 tablet by mouth As Needed.     • tamsulosin (FLOMAX) 0.4 MG capsule 24 hr capsule Take 1 capsule by mouth Daily. 30 capsule 0     No facility-administered encounter medications on file as of 3/29/2023.     ADULT ILLNESSES:   Leukocytosis (disorder) ( ICD-10:D72.829 ;Elevated white blood cell count, unspecified   Fatigue (finding) ( ICD-10:R53.83 ;Other fatigue   Heart palpitations ( ; ICD-10:R00.2 ;Palpitations )   Hypertension ( ICD-10:I10 ;Essential (primary) hypertension   Iron deficiency ( ICD-10:E61.1 ;Iron deficiency   Irritable bowel syndrome ( ICD-10:K58.9 ;Irritable bowel syndrome without diarrhea   Miscarriage ( multiple; ICD-10:O03.9 ;Complete or unspecified spontaneous  without complication )  "  Polycystic ovarian syndrome ( ICD-10:E28.2 ;Polycystic ovarian syndrome/endometriosis   Asthma, 2018   PTSD, 2015   Stomach ulcers   Miscarriages   IBS   Avulsion fracture right hip last 12/2019.  Says \"they found a benign tumor in my right femur.\"  Has been by orthopedic oncology at [place] - Dr. Genevieve Guallpa, 06/10/2020 who reviewed an MRI and feels that the lesion is consistent with a fibroma.  They want to repeat an MRI in a month.Left carpal tunnel syndrome      SURGERIES:   Partial hysterectomy (procedure) no ovariectomies for endometriosis, 2016   Nephrolithotomy for removal of calculus, (extraction of kidney stone), 2002 and 2015   Laparoscopic laser destruction of endometriosis, 2011 and 2014   Cholecystectomy   EGD for esophageal dilation, 01/2018. Dr. Grande   Colonoscopy, 2016.   Left carpal tunnel release, 11/12/2019.  Dr. Yu  C-scope 06/19/2019.  Postoperative diagnosis: Normal terminal ileum.  Normal cecum, normal appendiceal orifice and ileocecal valve.  2 small 2 to 3 mm sized polyps.  Random biopsies in the right and left hemicolon performed to rule out microscopic otitis.  Internal hemorrhoids in the rectum.  Plan: Continue WelChol and Bentyl.  Start daily fiber supplement.  Repeat colonoscopy pending biopsy results.  03/11/2021-EGD.  Gastritis biopsy performed.  Small bowel biopsies performed to rule out celiac disease.  Esophageal gastric inlet patch biopsy performed to rule out celiac disease.  Diagnosis: A.small bowel, biopsies: Benign small bowel mucosa with no significant histopathologic abnormality.  B.gastric biopsy: Benign gastric mucosa with mild reactive changes.  C.esophagus, biopsy, esophageal-gastric inlet patch biopsy: Interface of glandular and squamous mucosa with changes of reflux esophagitis.  No intestinal metaplasia or dysplasia identified.  03/01/2022- right L5--S1 microdiscectomy        ADULT ILLNESSES:  Patient Active Problem List   Diagnosis Code   • " Endometriosis N80.9   • Laryngopharyngeal reflux K21.9   • Pharyngoesophageal dysphagia R13.14   • Morbid obesity with BMI of 40.0-44.9, adult (HCC) E66.01, Z68.41   • Leukocytosis D72.829   • Iron deficiency E61.1   • Asthma J45.909   • HTN (hypertension) I10   • Lesion of left femur M89.9   • Kidney stone N20.0   • Palpitations R00.2   • S/P lumbar microdiscectomy Z98.890     SURGERIES:  Past Surgical History:   Procedure Laterality Date   • BACK SURGERY     • BONE MARROW BIOPSY     • CHOLECYSTECTOMY     • COLONOSCOPY     • CYSTOSCOPY N/A 09/19/2016    Procedure: CYSTOSCOPY;  Surgeon: Jonathan Figueroa MD;  Location: [facility] OR;  Service:    • CYSTOSCOPY ELECTROHYDRAULIC LITHOTRIPSY     • EYE SURGERY      LASIK   • HYSTEROSCOPY ENDOMETRIAL ABLATION      X2   • KIDNEY STONE SURGERY      removed   • SALPINGECTOMY Bilateral    • SKIN BIOPSY     • TOTAL LAPAROSCOPIC HYSTERECTOMY N/A 09/19/2016    Procedure: TOTAL LAPAROSCOPIC HYSTERECTOMY WITH DAVINCI SI ROBOT WITH CYSTO;  Surgeon: Jonathan Figueroa MD;  Location: [facility] OR;  Service:      HEALTH MAINTENANCE ITEMS:  Health Maintenance Due   Topic Date Due   • COLORECTAL CANCER SCREENING  Never done   • COVID-19 Vaccine (1) Never done   • Pneumococcal Vaccine 0-64 (1 - PCV) Never done   • TDAP/TD VACCINES (1 - Tdap) Never done   • HEPATITIS C SCREENING  Never done   • PAP SMEAR  Never done   • ANNUAL PHYSICAL  10/16/2019   • INFLUENZA VACCINE  Never done       <no information>  Last Completed Colonoscopy     This patient has no relevant Health Maintenance data.        There is no immunization history for the selected administration types on file for this patient.  Last Completed Mammogram     This patient has no relevant Health Maintenance data.            FAMILY HISTORY:  Family History   Problem Relation Age of Onset   • Anesthesia problems Mother    • Hypertension Mother    • Anesthesia problems Maternal Grandmother    • Hypertension Father    • Arrhythmia Father    •  Colon cancer Paternal Grandmother    • Pancreatic cancer Paternal Grandmother    • Liver cancer Paternal Grandfather    • Heart attack Paternal Grandfather    • Ovarian cancer Neg Hx    • Breast cancer Neg Hx    • Uterine cancer Neg Hx      SOCIAL HISTORY:  Social History     Socioeconomic History   • Marital status:    Tobacco Use   • Smoking status: Never   • Smokeless tobacco: Never   Vaping Use   • Vaping Use: Never used   Substance and Sexual Activity   • Alcohol use: No     Comment: occasional   • Drug use: No   • Sexual activity: Yes     Partners: Male     Birth control/protection: None       REVIEW OF SYSTEMS:  Constitutional:   The patient's appetite is good and energy is oftentimes low. Says she does yoga and aerobics. Is still working as a , putting in 12 hour days.  She has lost 2 lb (in addition to 15 lb at her 2 prior visits) since her last visit.  She has no fevers, chills, or drenching night sweats. Her sleep habits seem appropriate.  Has received # 2 COVID vaccination last 02/17/2021.  Ear/Nose/Throat/Mouth:   She reports no ear pains, with seasonal sinus symptoms, but no sore throat, nosebleeds, or sore tongue. She has no headaches. She denies any hoarseness, change in voice quality.   Ocular:   She reports no eye pain, significant change in visual acuity, double vision, or blurry vision.  Respiratory:   She reports baseline exertional dyspnea but is not short of breath with her routine activities. Says she has asthma and uses albuterol inhaler as needed, less since use of Protonix.  She has no chronic cough, significant shortness of breathing at rest, phlegm production, or unexplained chest wall pain.  Cardiovascular:   She reports no recurrent non exertional chest pain, chest pressure, nor chest heaviness on PPI. Remains on Inderal. She reports no claudication. She reports fewer palpitations but no symptomatic orthostasis. Says that she had a exercise stress test  "last 09/2018. \"All ok.\"  Gastrointestinal:   She reports much improved epigastric/RUQ pains no recurrent hematemesis, last 02/20/2021.  Says EGD (sometime 03/2021 by Dr. Grande) showed \"healing ulcer in the stomach.\"  Has since been on PPI, dexilant and Bentyl daily and as needed Carafate.  Reports much improved dysphagia (had EGD with dilation).  She currently has no nausea, or vomiting. She has fewer bouts of postprandial abdominal pain, bloating, but no worsening cramping, chronic change in bowel habits, with no dark (off oral iron due to GI intolerance- cramps and diarrhea) discoloration of the stool. She reports no overt rectal bleeding. She reports chronic alternating constipation and soft stools without overt diarrhea from IBS. Had a colonoscopy, 06/2019. \"2 polyps.\" Dr. Grande follows. Liver US ordered for 05/06/2022 at [facility] reportedly showed fatty liver.  Genitourinary:   She reports no urinary burning, frequency, dribbling, or discoloration. She reports difficulty controlling her bladder. Uses pads. She has no need to urinate frequently through the night. She passes kidney stones \"just about every other month, but none since the last time I saw you\" sometimes associated with hematuria.  Most recently hospitalized in 05/16/2022.  \"I passed it.\" Is followed by Dr. Bañuelos for urology.  Musculoskeletal:   She reports resolution of right hip pains since avulsion fracture, 12/25/2020.  Has been seen by ortho at [facility].  On 03/01/2022 underwent right L5-S1 microdiscectomy which she says has significantly relieved her back pain.  She also says that the chronic (poly) arthralgias of the neck, shoulders, feet/toes with myalgias, and nighttime leg cramping are all improved as well.  The left wrist is \"much better\" since the carpal tunnel surgery last 11/12/2019.  Extremities:   She reports no trouble with fluid retention or significant leg swelling.  Endocrine:   She reports no problems with excess thirst, " "excessive urination, vasomotor instability.  Heme/Lymphatic:   She reports no unexplained bleeding, bruising, petechial rashes, or swollen glands.  Skin:   She reports no itching, rashes, or lesions which won't heal.  Neuro:   She reports no loss of consciousness, seizures, fainting spells, or dizziness. She reports no weakness of face, arms, or legs. She has no difficulty with speech. She has no tremors. She no longer has paresthesias of the fingers on the left hand that radiated to the left elbow since surgery last 11/2019.  Psych:   She denies depression but admits to anxiety. She reports no mood swings.         VITAL SIGNS: /78   Pulse 92   Temp 96.3 °F (35.7 °C)   Resp 18   Ht 161.3 cm (63.5\")   Wt 108 kg (238 lb 14.4 oz)   LMP 09/13/2016 Comment: negative hcg noted to chart  SpO2 98%   BMI 41.66 kg/m² Body surface area is 2.09 meters squared.  Pain Score    03/29/23 1005   PainSc: 0-No pain         PHYSICAL EXAMINATION:   General:   She is a pleasant, cooperative, (less) anxious, (less) obese, modestly-kept female who is comfortable at rest. She arrived in the exam room ambulatory without a cane. She appears to be her stated age. Her skin color is normal. She is here alone  Head/Neck:   The patient is anicteric and atraumatic. Mouth/oropharynx are clear.  No tonsillar hypertrophy without exudates nor hyperemia.  The trachea is midline. The neck is supple without evidence of cervical nor supraclavicular adenopathy.   Eyes:   The pupils are equal, round, and reactive to light. The extraocular movements are full. There is no scleral jaundice or erythema.   Chest:   The respiratory efforts are normal and unhindered. The chest is clear to auscultation and percussion. There are no wheezes, rhonchi, rales, or asymmetry of breath sounds.  Cardiovascular:   The patient has a regular cardiac rate and rhythm without murmurs, rubs, or gallops. The peripheral pulses are equal and full.  Abdomen:   The belly " is soft and obese. There is no rebound or guarding. There is no organomegaly, mass-effect, or tenderness. Bowel sounds are active and of normal character.  Extremities:   There is no evidence of cyanosis, clubbing, or edema.   Rheumatologic:   There is no overt evidence of rheumatoid deformities of the hands. There is no sausaging of the fingers. There is no sign of active synovitis. The gait is slow and independent, no longer favors the right hip and no longer cane supported.  Cutaneous:   There are no overt rashes, disseminated lesions, purpura, or petechiae.   Lymphatics:   There is no evidence of adenopathy in the cervical, supraclavicular, axillary areas.  Neurologic:   The patient is alert, oriented, cooperative, and pleasant. She is appropriately conversant. She ambulated into the exam room without assistance and transferred from chair to exam table unaided. There is no overt dysfunction of the motor, sensory, cerebellar systems.  Psych:   Mood and affect are appropriate for circumstance. Eye contact is appropriate. Normal judgement and decision making.         LABS    Lab Results - Last 18 Months   Lab Units 03/24/23  1202 11/23/22  0943 07/27/22  0905 05/16/22  1345 04/26/22  1127 01/25/22  1453 10/26/21  1517   HEMOGLOBIN g/dL 13.2 12.9 13.1 14.2 13.3 13.6 12.8   HEMATOCRIT % 42.2 41.0 40.9 42.3 41.1 42.4 37.8   MCV fL 89.4 89.7 90.7 87.0 89.5 90.2 87.1   WBC 10*3/mm3 13.70* 12.71* 12.41* 19.67* 13.18* 17.30* 11.83*   RDW % 12.8 13.0 13.0 12.7 12.7 12.8 13.1   MPV fL 9.2 9.3 9.4 9.5 9.6 9.3 9.8   PLATELETS 10*3/mm3 287 295 287 287 287 293 271   IMM GRAN % % 0.7* 0.9* 0.8* 0.9*  --  1.2* 0.6*   NEUTROS ABS 10*3/mm3 8.10* 7.67* 7.74* 15.87* 7.51* 10.90* 6.89   LYMPHS ABS 10*3/mm3 4.58* 3.89* 3.77* 2.51  --  5.11* 3.92*   MONOS ABS 10*3/mm3 0.67 0.69 0.48 0.82  --  0.75 0.59   EOS ABS 10*3/mm3 0.20 0.25 0.25 0.24 0.40 0.26 0.30   BASOS ABS 10*3/mm3 0.05 0.09 0.07 0.06  --  0.08 0.06   IMMATURE GRANS (ABS)  10*3/mm3 0.10* 0.12* 0.10* 0.17*  --  0.20* 0.07*   NRBC /100 WBC 0.0 0.0 0.0 0.0  --  0.0 0.0   NEUTROPHIL % %  --   --   --   --  57.0  --   --    MONOCYTES % %  --   --   --   --  7.0  --   --    ATYP LYMPH % %  --   --   --   --  9.0*  --   --        Lab Results - Last 18 Months   Lab Units 05/16/22  1345 04/26/22  1127 03/02/22  0047 03/01/22  0623 02/22/22  1558 10/26/21  1517   GLUCOSE mg/dL 121*  --   --   --   --   --    SODIUM mmol/L 137  --  139 141 138  --    POTASSIUM mmol/L 4.3  --  4.6 4.3 4.1  --    TOTAL CO2 mmol/L  --   --  20* 21* 25  --    CO2 mmol/L 23.0  --   --   --   --   --    CHLORIDE mmol/L 103  --  106 108* 106  --    ANION GAP mmol/L 11.0  --  13 12 7  --    CREATININE mg/dL 0.87  --  1.04 0.77 0.85  --    BUN mg/dL 12  --  9 10 12  --    BUN / CREAT RATIO  13.8  --   --   --   --   --    CALCIUM mg/dL 8.9  --  8.0* 9.0 9.5  --    ALK PHOS U/L 84 74  --   --   --  76   TOTAL PROTEIN g/dL 7.1 7.0  --   --   --  6.9   ALT (SGPT) U/L 22 32  --   --   --  28   AST (SGOT) U/L 24 40*  --   --   --  19   BILIRUBIN mg/dL 0.4 0.3  --   --   --  0.2   ALBUMIN g/dL 3.90 3.90  --   --   --  3.60   GLOBULIN gm/dL 3.2  --   --   --   --   --        Lab Results - Last 18 Months   Lab Units 01/25/22  1453   REFERENCE LAB REPORT  See Attached Report       Lab Results - Last 18 Months   Lab Units 03/24/23  1202 11/23/22  0943 07/27/22  0905 04/26/22  1127 01/25/22  1453 10/26/21  1517   IRON mcg/dL 98 59 72 79 82 63   TIBC mcg/dL 429 402 428 454 489 432   IRON SATURATION % 23 15* 17* 17* 17* 15*   FERRITIN ng/mL 62.10 123.20 96.23 102.70 102.20 104.50       ASSESSMENT:   1.  Leukocytosis, neutrophilic.   --WBC 13.7; ANC 4.58, 3/24/2023 (prior range: WBC 8.74- 17.3; ANC 4.93- 10.9). Evidence indicates a reactive etiology.   --01/27/2022-peripheral blood flow cytometry: No evidence of abnormal myeloid maturation or increased blast population.  No evidence for a lymphoproliferative disorder.    2.  Iron  "deficiency (borderline) without anemia. Intolerant of oral iron (cramps, soft stools)  3.  Chronic kidney stones followed by urology.    --ER visit, 05/16/2022 for right flank pain nausea vomiting and hematuria.  --05/16/2022- CT abdomen/pelvis.  3 mm distal right ureteral calculus causing mild right hydroureteronephrosis.  Pronounced right perinephric and periureteral fat stranding and ill-defined fluid.  Recommend clinical correlation to exclude superimposed infection.  Multiple nonobstructing left renal calculi.  Hepatic steatosis.  -- 05/24/2022-seen by urology.  Patient reports passing a kidney stone.  KUB with no stone in the right distal ureter.  Follow-up with Dr. Bañuelos in October for annual visit.  --10/04/2022- Follow-up urology for kidney stones.  Hydration recommended.  Avoid large amounts of tea, cocoa, cola drinks, and fruit juice,     4.  History of gastric ulcer   --Prior history of hemoptysis.    --History of single episode of hematemesis, \"multiple napkins\" last 02/20/2021 which awoke her from her sleep at 3 am.    --03/11/2021-EGD.  Gastritis biopsy performed.  Small bowel biopsies performed to rule out celiac disease.  Esophageal gastric inlet patch biopsy performed to rule out celiac disease.  Diagnosis: A.small bowel, biopsies: Benign small bowel mucosa with no significant histopathologic abnormality.  B.gastric biopsy: Benign gastric mucosa with mild reactive changes.  C.esophagus, biopsy, esophageal-gastric inlet patch biopsy: Interface of glandular and squamous mucosa with changes of reflux esophagitis.  No intestinal metaplasia or dysplasia identified.  --Remains on PPI, carafate, bentyl and dexilant    5.  Irritable bowel syndrome.   --Last c-scope, 06/19/2019. Normal terminal ileum.  Normal cecum, normal appendiceal orifice and ileocecal valve.  2 small 2 to 3 mm sized polyps.  Random biopsies in the right and left hemicolon performed to rule out microscopic otitis.  Internal hemorrhoids " in the rectum.  Plan: Continue WelChol and Bentyl.  Start daily fiber supplement.  Repeat colonoscopy pending biopsy results.  6.  Polycystic ovarian syndrome.   7.  Asthma.  Quiescent  8.  Migratory polyarthralgias, NOS. Fibromyalgia?   9.  Chronic right hip pain. Improved/resolved since surgery (below)  --01/21/2022 --Received a spinal epidural injection [place].  Subjectively no improvement in symptoms.  --12/23/2022-Lumbar MRI--degenerative changes of the lower 2 lumbar levels with a moderate right L5-S1 paracentral disc protrusion resulting in effacement of the right lateral recess and likely compression of the descending right S1 nerve root.  -- 03/01/2022- right L5-S1 microdiscectomy          10.  Fatty liver      RECOMMENDATIONS:   1.  Apprised of the labs on 3/24/2023 with persistent low-grade neutrophilic leukocytosis otherwise normal CBC.  Repleted serum iron (98; from 59; 72; 79; 82; 63), repleted iron saturation (23%; prior: 15%-24%), depressed ferritin (63- prior:  ).    2.  Previously apprised of peripheral blood flow cytometry, 01/27/2022 (above).  No evidence for abnormal myeloid maturation, increased blast population or lymphoproliferative disorder.  3. Previously apprised of the bone marrow biopsy, 02/11/2019 (above). Unrevealing.   4. Previously discussed the comprehensive blood report, 11/14 (above). Reactive leukocytosis favored. Negative JAK2 mutation.     5. Stay off ferrous sulfate - intolerant    6.  Continue Protonix, carafate, dexilant, Bentyl and other currently identified medications.   7.  Continue ongoing management per primary care and other specialists.   8.  Return to the office in 12 weeks with pre-office CBC and differential serum iron, Fe sat, ferritin- to see Dr. Wilson if available Re:  Leukocytosis.       I spent 32 minutes caring for Breonna on this date of service. This time includes time spent by me in the following activities: preparing for the visit, reviewing  tests, performing a medically appropriate examination and/or evaluation, counseling and educating the patient/family/caregiver, ordering medications, tests, or procedures and documenting information in the medical record.            cc: Antoine Hernandez DO

## 2023-03-24 ENCOUNTER — LAB (OUTPATIENT)
Dept: LAB | Facility: HOSPITAL | Age: 48
End: 2023-03-24
Payer: COMMERCIAL

## 2023-03-24 DIAGNOSIS — D72.829 LEUKOCYTOSIS, UNSPECIFIED TYPE: ICD-10-CM

## 2023-03-24 LAB
BASOPHILS # BLD AUTO: 0.05 10*3/MM3 (ref 0–0.2)
BASOPHILS NFR BLD AUTO: 0.4 % (ref 0–1.5)
DEPRECATED RDW RBC AUTO: 41.7 FL (ref 37–54)
EOSINOPHIL # BLD AUTO: 0.2 10*3/MM3 (ref 0–0.4)
EOSINOPHIL NFR BLD AUTO: 1.5 % (ref 0.3–6.2)
ERYTHROCYTE [DISTWIDTH] IN BLOOD BY AUTOMATED COUNT: 12.8 % (ref 12.3–15.4)
FERRITIN SERPL-MCNC: 62.1 NG/ML (ref 13–150)
HCT VFR BLD AUTO: 42.2 % (ref 34–46.6)
HGB BLD-MCNC: 13.2 G/DL (ref 12–15.9)
IMM GRANULOCYTES # BLD AUTO: 0.1 10*3/MM3 (ref 0–0.05)
IMM GRANULOCYTES NFR BLD AUTO: 0.7 % (ref 0–0.5)
IRON 24H UR-MRATE: 98 MCG/DL (ref 37–145)
IRON SATN MFR SERPL: 23 % (ref 20–50)
LYMPHOCYTES # BLD AUTO: 4.58 10*3/MM3 (ref 0.7–3.1)
LYMPHOCYTES NFR BLD AUTO: 33.4 % (ref 19.6–45.3)
MCH RBC QN AUTO: 28 PG (ref 26.6–33)
MCHC RBC AUTO-ENTMCNC: 31.3 G/DL (ref 31.5–35.7)
MCV RBC AUTO: 89.4 FL (ref 79–97)
MONOCYTES # BLD AUTO: 0.67 10*3/MM3 (ref 0.1–0.9)
MONOCYTES NFR BLD AUTO: 4.9 % (ref 5–12)
NEUTROPHILS NFR BLD AUTO: 59.1 % (ref 42.7–76)
NEUTROPHILS NFR BLD AUTO: 8.1 10*3/MM3 (ref 1.7–7)
NRBC BLD AUTO-RTO: 0 /100 WBC (ref 0–0.2)
PLATELET # BLD AUTO: 287 10*3/MM3 (ref 140–450)
PMV BLD AUTO: 9.2 FL (ref 6–12)
RBC # BLD AUTO: 4.72 10*6/MM3 (ref 3.77–5.28)
TIBC SERPL-MCNC: 429 MCG/DL (ref 298–536)
TRANSFERRIN SERPL-MCNC: 288 MG/DL (ref 200–360)
WBC NRBC COR # BLD: 13.7 10*3/MM3 (ref 3.4–10.8)

## 2023-03-24 PROCEDURE — 84466 ASSAY OF TRANSFERRIN: CPT

## 2023-03-24 PROCEDURE — 82728 ASSAY OF FERRITIN: CPT

## 2023-03-24 PROCEDURE — 83540 ASSAY OF IRON: CPT

## 2023-03-24 PROCEDURE — 36415 COLL VENOUS BLD VENIPUNCTURE: CPT

## 2023-03-24 PROCEDURE — 85025 COMPLETE CBC W/AUTO DIFF WBC: CPT

## 2023-03-29 ENCOUNTER — OFFICE VISIT (OUTPATIENT)
Dept: ONCOLOGY | Facility: CLINIC | Age: 48
End: 2023-03-29
Payer: COMMERCIAL

## 2023-03-29 VITALS
OXYGEN SATURATION: 98 % | DIASTOLIC BLOOD PRESSURE: 78 MMHG | SYSTOLIC BLOOD PRESSURE: 138 MMHG | BODY MASS INDEX: 40.78 KG/M2 | TEMPERATURE: 96.3 F | WEIGHT: 238.9 LBS | HEIGHT: 64 IN | HEART RATE: 92 BPM | RESPIRATION RATE: 18 BRPM

## 2023-03-29 DIAGNOSIS — D72.829 LEUKOCYTOSIS, UNSPECIFIED TYPE: Primary | ICD-10-CM

## 2023-03-29 PROCEDURE — 99214 OFFICE O/P EST MOD 30 MIN: CPT | Performed by: INTERNAL MEDICINE

## 2023-04-17 ENCOUNTER — OFFICE VISIT (OUTPATIENT)
Dept: CARDIOLOGY | Facility: CLINIC | Age: 48
End: 2023-04-17
Payer: COMMERCIAL

## 2023-04-17 VITALS
SYSTOLIC BLOOD PRESSURE: 128 MMHG | HEIGHT: 64 IN | HEART RATE: 76 BPM | OXYGEN SATURATION: 98 % | DIASTOLIC BLOOD PRESSURE: 78 MMHG | WEIGHT: 238 LBS | BODY MASS INDEX: 40.63 KG/M2

## 2023-04-17 DIAGNOSIS — I10 PRIMARY HYPERTENSION: Primary | ICD-10-CM

## 2023-04-17 DIAGNOSIS — E66.01 MORBID OBESITY WITH BMI OF 40.0-44.9, ADULT: ICD-10-CM

## 2023-04-17 DIAGNOSIS — R00.2 PALPITATIONS: ICD-10-CM

## 2023-04-17 RX ORDER — CLONIDINE HYDROCHLORIDE 0.1 MG/1
0.1 TABLET ORAL 2 TIMES DAILY PRN
Qty: 60 TABLET | Refills: 5 | Status: SHIPPED | OUTPATIENT
Start: 2023-04-17

## 2023-04-17 NOTE — PROGRESS NOTES
Subjective:     Encounter Date:04/17/2023      Patient ID: Breonna Bellamy is a 47 y.o. female.    Chief Complaint:  History of Present Illness   Breonna Bellamy is a 47-year-old female who presents today in clinic for a follow-up appointment.    The patient reports she has been doing pretty good. She states she has taken on a more stressful job. She is currently the principle at an elementary school. She has lost about 20 pounds. She has also had back surgery. She reports while she is at work, she gets a terrible headache. She had the school nurse check her blood pressure one day and it was 110 diastolic. She states she takes Tylenol or Inderal to help with the headaches. She reports last weekend, she went to pick something up off of the floor and she got dizzy. She notes she had to sit down and check her blood pressure. Her blood pressure was 100/50 mmHg. It normally runs about 127/78 mmHg or 127/80 mmHg. She states her blood pressure fluctuates occasionally going from low to high or high to low. She reports she also has the feeling of her heart running backwards occasionally. She is taking Inderal 3 times per day, one in the morning and two at bedtime. If she gets a headache that causes her blood pressure to be increased, she takes 4 per day.      Review of Systems   Constitutional: Positive for weight loss. Negative for diaphoresis, fever and malaise/fatigue.   HENT: Negative for congestion.    Eyes: Negative for vision loss in left eye and vision loss in right eye.   Cardiovascular: Negative for chest pain, claudication, dyspnea on exertion, irregular heartbeat, leg swelling, orthopnea, palpitations and syncope.   Respiratory: Negative for cough, shortness of breath and wheezing.    Hematologic/Lymphatic: Negative for adenopathy.   Skin: Negative for rash.   Musculoskeletal: Negative for joint pain and joint swelling.   Gastrointestinal: Negative for abdominal pain, diarrhea, nausea and vomiting.   Neurological:  Positive for headaches. Negative for excessive daytime sleepiness, dizziness, focal weakness, light-headedness, numbness and weakness.   Psychiatric/Behavioral: Negative for depression. The patient does not have insomnia.        Otherwise complete ROS reviewed and negative except as mentioned in the HPI.    Current Outpatient Medications:   •  albuterol (PROVENTIL HFA;VENTOLIN HFA) 108 (90 Base) MCG/ACT inhaler, Inhale 2 puffs Every 4 (Four) Hours As Needed for Wheezing., Disp: , Rfl:   •  busPIRone (BUSPAR) 10 MG tablet, Take 1 tablet by mouth As Needed., Disp: , Rfl:   •  Calcium Acetate, Phos Binder, (CALCIUM ACETATE PO), Take  by mouth., Disp: , Rfl:   •  cholecalciferol (VITAMIN D3) 1000 units tablet, Take 1 tablet by mouth Daily., Disp: , Rfl:   •  dexlansoprazole (DEXILANT) 60 MG capsule, Take 1 capsule by mouth Daily., Disp: , Rfl:   •  dicyclomine (BENTYL) 20 MG tablet, Take 2 tablets by mouth Every Night., Disp: , Rfl:   •  levocetirizine (XYZAL) 5 MG tablet, , Disp: , Rfl:   •  ondansetron ODT (ZOFRAN-ODT) 4 MG disintegrating tablet, Place 1 tablet on the tongue Every 6 (Six) Hours As Needed for Nausea., Disp: 12 tablet, Rfl: 0  •  pantoprazole (PROTONIX) 40 MG EC tablet, , Disp: , Rfl:   •  propranolol (INDERAL) 80 MG tablet, Take 1 tablet by mouth 3 (Three) Times a Day., Disp: 180 tablet, Rfl: 5  •  sucralfate (CARAFATE) 1 G tablet, Take 1 tablet by mouth As Needed., Disp: , Rfl:   •  tamsulosin (FLOMAX) 0.4 MG capsule 24 hr capsule, Take 1 capsule by mouth Daily., Disp: 30 capsule, Rfl: 0  •  cloNIDine (Catapres) 0.1 MG tablet, Take 1 tablet by mouth 2 (Two) Times a Day As Needed for High Blood Pressure (SBP > 160)., Disp: 60 tablet, Rfl: 5       Objective:      Vitals:    04/17/23 0909   BP: 128/78   Pulse: 76   SpO2: 98%     Constitutional:       Appearance: Healthy appearance. Not in distress.   Neck:      Vascular: No JVR. JVD normal.   Pulmonary:      Effort: Pulmonary effort is normal.       Breath sounds: Normal breath sounds. No wheezing. No rhonchi. No rales.   Chest:      Chest wall: Not tender to palpatation.   Cardiovascular:      PMI at left midclavicular line. Normal rate. Regular rhythm. Normal S1. Normal S2.      Murmurs: There is no murmur.      No gallop. No click. No rub.   Pulses:     Intact distal pulses.   Edema:     Peripheral edema absent.   Abdominal:      General: Bowel sounds are normal.      Palpations: Abdomen is soft.      Tenderness: There is no abdominal tenderness.   Musculoskeletal: Normal range of motion.         General: No tenderness. Skin:     General: Skin is warm and dry.   Neurological:      General: No focal deficit present.      Mental Status: Alert and oriented to person, place and time.         Lab Review:         ECG 12 Lead    Date/Time: 4/18/2023 8:56 AM  Performed by: Gabriel Hadley DO  Authorized by: Gabriel Hadley DO   Comparison: compared with previous ECG   Similar to previous ECG  Rhythm: sinus rhythm and sinus arrhythmia  Rate: normal  Conduction: conduction normal  QRS axis: normal  Other: no other findings    Clinical impression: abnormal EKG              Assessment/Plan:     Problem List Items Addressed This Visit        Cardiac and Vasculature    HTN (hypertension) - Primary    Relevant Medications    cloNIDine (Catapres) 0.1 MG tablet    Palpitations       Endocrine and Metabolic    Morbid obesity with BMI of 40.0-44.9, adult     1. Hypertension  - The patient complains of headaches caused by her blood pressure. She states her blood pressure flucuates occasionally and she cannot get it under control. She is currently taking Inderal 3 times per day, sometimes 4 times per day. Will prescribe clonidine as needed for her to take if her top number is greater than 150 or 160. She is advised to continue checking her blood pressure and making sure it is in normal range while taking the medication.     Recommendations/plans: The  patient will follow up in 1 year or if symptoms worsen or fail to improve.    Transcribed from ambient dictation for Gabriel Hadley DO by Jessica Becerra.  04/17/23   11:00 CDT    Patient or patient representative verbalized consent to the visit recording.  I have personally performed the services described in this document as transcribed by the above individual, and it is both accurate and complete.  Gabriel Hadley DO  4/18/2023  08:56 CDT

## 2023-05-04 RX ORDER — PROPRANOLOL HYDROCHLORIDE 80 MG/1
TABLET ORAL
Qty: 270 TABLET | Refills: 3 | Status: SHIPPED | OUTPATIENT
Start: 2023-05-04

## 2023-06-05 ENCOUNTER — TELEPHONE (OUTPATIENT)
Dept: ONCOLOGY | Facility: CLINIC | Age: 48
End: 2023-06-05

## 2023-06-05 DIAGNOSIS — N20.0 KIDNEY STONE: Primary | ICD-10-CM

## 2023-06-05 NOTE — TELEPHONE ENCOUNTER
Caller: Breonna Bellamy    Relationship to patient: Self    Best call back number: [phone]    Chief complaint: OUT OF TOWN     Type of visit: LAB & F/U 1     Requested date: CALL TO R/S     If rescheduling, when is the original appointment: 6/23/2023

## 2023-06-07 NOTE — PROGRESS NOTES
Chief Complaint  Kidney Stones    Subjective          Breonnagila Bellamy presents to [facility] [place]   Ms. Bellamy is a recurrent kidney stone former.  She comes in today prior to her anticipated visit because she has been having LEFT flank pain.  Previous imaging has shown multiple nonobstructing left renal calculi.  Similar to other stones she has had.  She actually passed a stone on the right less than a month ago.      Current Outpatient Medications:     albuterol (PROVENTIL HFA;VENTOLIN HFA) 108 (90 Base) MCG/ACT inhaler, Inhale 2 puffs Every 4 (Four) Hours As Needed for Wheezing., Disp: , Rfl:     busPIRone (BUSPAR) 10 MG tablet, Take 1 tablet by mouth As Needed., Disp: , Rfl:     Calcium Acetate, Phos Binder, (CALCIUM ACETATE PO), Take  by mouth., Disp: , Rfl:     cholecalciferol (VITAMIN D3) 1000 units tablet, Take 1 tablet by mouth Daily., Disp: , Rfl:     cloNIDine (Catapres) 0.1 MG tablet, Take 1 tablet by mouth 2 (Two) Times a Day As Needed for High Blood Pressure (SBP > 160)., Disp: 60 tablet, Rfl: 5    dexlansoprazole (DEXILANT) 60 MG capsule, Take 1 capsule by mouth Daily., Disp: , Rfl:     dicyclomine (BENTYL) 20 MG tablet, Take 2 tablets by mouth Every Night., Disp: , Rfl:     levocetirizine (XYZAL) 5 MG tablet, , Disp: , Rfl:     ondansetron ODT (ZOFRAN-ODT) 4 MG disintegrating tablet, Place 1 tablet on the tongue Every 6 (Six) Hours As Needed for Nausea., Disp: 12 tablet, Rfl: 0    pantoprazole (PROTONIX) 40 MG EC tablet, , Disp: , Rfl:     propranolol (INDERAL) 80 MG tablet, TAKE (1) TABLET BY MOUTH THREE TIMES DAILY., Disp: 270 tablet, Rfl: 3    sucralfate (CARAFATE) 1 G tablet, Take 1 tablet by mouth As Needed., Disp: , Rfl:     tamsulosin (FLOMAX) 0.4 MG capsule 24 hr capsule, Take 1 capsule by mouth Daily., Disp: 30 capsule, Rfl: 0  Past Medical History:   Diagnosis Date    Abnormal Pap smear of cervix     Anemia     Asthma     Cervical dysplasia     Hypertension     IBS  "(irritable bowel syndrome)     IBS (irritable bowel syndrome)     IBS (irritable bowel syndrome)     In vitro fertilization     Infertility, female     Kidney stones     Kidney stones     Leukocytosis     LGSIL (low grade squamous intraepithelial dysplasia)     Polycystic ovarian disease     PONV (postoperative nausea and vomiting)     Spontaneous      x2    Tachycardia      Past Surgical History:   Procedure Laterality Date    BACK SURGERY      BONE MARROW BIOPSY      CHOLECYSTECTOMY      COLONOSCOPY      CYSTOSCOPY N/A 2016    Procedure: CYSTOSCOPY;  Surgeon: Jonathan Figueroa MD;  Location: [facility] OR;  Service:     CYSTOSCOPY ELECTROHYDRAULIC LITHOTRIPSY      EYE SURGERY      LASIK    HYSTEROSCOPY ENDOMETRIAL ABLATION      X2    KIDNEY STONE SURGERY      removed    SALPINGECTOMY Bilateral     SKIN BIOPSY      TOTAL LAPAROSCOPIC HYSTERECTOMY N/A 2016    Procedure: TOTAL LAPAROSCOPIC HYSTERECTOMY WITH DAVINCI SI ROBOT WITH CYSTO;  Surgeon: Jonathan Figueroa MD;  Location: [facility] OR;  Service:            Review of Systems  Review  of systems  Constitutional: Negative for chills and fever.   Gastrointestinal: Negative for abdominal pain, anal bleeding and blood in stool.   Genitourinary: Negative for  hematuria.      Objective   PHYSICAL EXAM  Vital Signs:   Temp 97 °F (36.1 °C)   Ht 160 cm (63\")   Wt 109 kg (240 lb)   BMI 42.51 kg/m²     Physical Exam      DATA  Result Review :              Results for orders placed or performed in visit on 23   POC Urinalysis Dipstick, Multipro    Specimen: Urine   Result Value Ref Range    Color Yellow Yellow, Straw, Dark Yellow, Brenda    Clarity, UA Clear Clear    Glucose,  mg/dL (A) Negative mg/dL    Bilirubin Negative Negative    Ketones, UA Negative Negative    Specific Gravity  1.020 1.005 - 1.030    Blood, UA Large (A) Negative    pH, Urine 6.5 5.0 - 8.0    Protein, POC Negative Negative mg/dL    Urobilinogen, UA 0.2 E.U./dL Normal, 0.2 " E.U./dL    Nitrite, UA Negative Negative    Leukocytes Negative Negative           XR abdomen kub (06/05/2023 13:09)  I cannot see the stone on this KUB.  Is reported she has left renal calculi but the images I am looking and I really cannot see them.  We do know she has from a previous CT done.    CT Abdomen Pelvis Without Contrast (06/08/2023 11:16)  She has an obstructing stone left ureter above the level iliac vessels.  The location of the stone would make ESWL very difficult considering its location relative to the posterior aspect of the pelvis.  Bilateral renal stones are also noted.     ASSESSMENT AND PLAN          Problem List Items Addressed This Visit          Genitourinary and Reproductive     Left Ureteral calculus      Other Visit Diagnoses       Gross hematuria                The patient has a left calculus in the Mid ureter that is symptomatic but the patient is not toxic and able to hydrate. I offered to do ureteroscopy on her tomorrow.  After hearing all options available for the treatment the patient has opted for expulsive therapy with an blocker.  We discussed other options including ureteroscopy, open ureteral lithotomy, in situ ESWL therapy and treatment with now for blocker as expulsive therapy.  We discussed that this is an off label indication for the use of an alpha blocker.  The patient understands to contact me for fever, flank pain that is refractory to pain relief medications,  excessive nausea and vomiting that prevents the patient from hydrating, or hematuria severe enough to produce blood clots.  The patient is encouraged to strain the urine.  Follow-up of this stone is also discussed.     Will start her on Tamsuosin 0.4 mg qd     Having  significant pain.  We talked about using a oral opiate therapy in hopes of controlling her pain so she can pass the stone.,  Send her in a prescription to her pharmacy for Norco 7.5 mg.  She will contact me next week to arrange follow-up or  consider ureteroscopy.      FOLLOW UP     No follow-ups on file.        (Please note that portions of this note were completed with a voice recognition program.)  Rony Bañuelos MD  06/08/23  13:23 CDT

## 2023-06-08 ENCOUNTER — HOSPITAL ENCOUNTER (OUTPATIENT)
Dept: GENERAL RADIOLOGY | Facility: HOSPITAL | Age: 48
Discharge: HOME | End: 2023-06-08
Payer: COMMERCIAL

## 2023-06-08 ENCOUNTER — OFFICE VISIT (OUTPATIENT)
Dept: UROLOGY | Facility: CLINIC | Age: 48
End: 2023-06-08
Payer: COMMERCIAL

## 2023-06-08 ENCOUNTER — HOSPITAL ENCOUNTER (OUTPATIENT)
Dept: CT IMAGING | Facility: HOSPITAL | Age: 48
Discharge: HOME | End: 2023-06-08
Payer: COMMERCIAL

## 2023-06-08 VITALS — HEIGHT: 63 IN | BODY MASS INDEX: 42.52 KG/M2 | TEMPERATURE: 97 F | WEIGHT: 240 LBS

## 2023-06-08 DIAGNOSIS — N20.0 KIDNEY STONE: ICD-10-CM

## 2023-06-08 DIAGNOSIS — N20.1 LEFT URETERAL CALCULUS: Primary | ICD-10-CM

## 2023-06-08 DIAGNOSIS — R31.0 GROSS HEMATURIA: ICD-10-CM

## 2023-06-08 LAB
BILIRUB BLD-MCNC: NEGATIVE MG/DL
CLARITY, POC: CLEAR
COLOR UR: YELLOW
GLUCOSE UR STRIP-MCNC: ABNORMAL MG/DL
KETONES UR QL: NEGATIVE
LEUKOCYTE EST, POC: NEGATIVE
NITRITE UR-MCNC: NEGATIVE MG/ML
PH UR: 6.5 [PH] (ref 5–8)
PROT UR STRIP-MCNC: NEGATIVE MG/DL
RBC # UR STRIP: ABNORMAL /UL
SP GR UR: 1.02 (ref 1–1.03)
UROBILINOGEN UR QL: ABNORMAL

## 2023-06-08 PROCEDURE — 74176 CT ABD & PELVIS W/O CONTRAST: CPT

## 2023-06-08 PROCEDURE — 81003 URINALYSIS AUTO W/O SCOPE: CPT | Performed by: UROLOGY

## 2023-06-08 PROCEDURE — 99214 OFFICE O/P EST MOD 30 MIN: CPT | Performed by: UROLOGY

## 2023-06-08 PROCEDURE — 74018 RADEX ABDOMEN 1 VIEW: CPT

## 2023-06-08 RX ORDER — HYDROCODONE BITARTRATE AND ACETAMINOPHEN 7.5; 325 MG/1; MG/1
1 TABLET ORAL EVERY 6 HOURS PRN
Qty: 16 TABLET | Refills: 0 | Status: SHIPPED | OUTPATIENT
Start: 2023-06-08

## 2023-06-09 DIAGNOSIS — N20.1 LEFT URETERAL CALCULUS: Primary | ICD-10-CM

## 2023-06-09 RX ORDER — TAMSULOSIN HYDROCHLORIDE 0.4 MG/1
1 CAPSULE ORAL DAILY
Qty: 30 CAPSULE | Refills: 0 | Status: SHIPPED | OUTPATIENT
Start: 2023-06-09

## 2023-09-08 ENCOUNTER — TELEPHONE (OUTPATIENT)
Dept: ONCOLOGY | Facility: CLINIC | Age: 48
End: 2023-09-08
Payer: COMMERCIAL

## 2023-09-08 NOTE — TELEPHONE ENCOUNTER
Caller: Breonna Bellamy    Relationship: Self    Best call back number: [phone]    What is the best time to reach you: ANYTIME    Who are you requesting to speak with (clinical staff, provider,  specific staff member): CLINICAL    What was the call regarding: PT REQUESTING A REFERRAL PUT IN FOR NEPHROLOGY DUE TO HER KIDNEY STONES, PLEASE CALL TO ADVISE    Is it okay if the provider responds through MyChart: YES

## 2023-09-11 NOTE — TELEPHONE ENCOUNTER
Pt has been notified that urology is who usually sees pts for kidney stones. She v/u stating she has a urologist and she will stay with Dr Bañuelos for now.

## 2023-11-09 RX ORDER — CLONIDINE HYDROCHLORIDE 0.1 MG/1
0.1 TABLET ORAL 2 TIMES DAILY PRN
Qty: 60 TABLET | Refills: 5 | Status: SHIPPED | OUTPATIENT
Start: 2023-11-09

## 2023-11-24 ENCOUNTER — HOSPITAL ENCOUNTER (OUTPATIENT)
Dept: MAMMOGRAPHY | Facility: HOSPITAL | Age: 48
Discharge: HOME | End: 2023-11-24
Admitting: NURSE PRACTITIONER
Payer: COMMERCIAL

## 2023-11-24 DIAGNOSIS — Z12.31 ENCOUNTER FOR SCREENING MAMMOGRAM FOR MALIGNANT NEOPLASM OF BREAST: ICD-10-CM

## 2023-11-24 PROCEDURE — 77067 SCR MAMMO BI INCL CAD: CPT

## 2023-11-24 PROCEDURE — 77063 BREAST TOMOSYNTHESIS BI: CPT

## 2023-12-21 ENCOUNTER — OFFICE VISIT (OUTPATIENT)
Dept: CARDIOLOGY | Facility: CLINIC | Age: 48
End: 2023-12-21
Payer: COMMERCIAL

## 2023-12-21 ENCOUNTER — LAB (OUTPATIENT)
Dept: LAB | Facility: HOSPITAL | Age: 48
End: 2023-12-21
Payer: COMMERCIAL

## 2023-12-21 VITALS
HEIGHT: 63 IN | DIASTOLIC BLOOD PRESSURE: 90 MMHG | HEART RATE: 75 BPM | WEIGHT: 240 LBS | RESPIRATION RATE: 18 BRPM | OXYGEN SATURATION: 100 % | SYSTOLIC BLOOD PRESSURE: 142 MMHG | BODY MASS INDEX: 42.52 KG/M2

## 2023-12-21 DIAGNOSIS — I10 PRIMARY HYPERTENSION: Primary | ICD-10-CM

## 2023-12-21 DIAGNOSIS — I35.1 MILD AORTIC INSUFFICIENCY: ICD-10-CM

## 2023-12-21 DIAGNOSIS — R00.2 PALPITATIONS: ICD-10-CM

## 2023-12-21 DIAGNOSIS — E61.1 IRON DEFICIENCY: ICD-10-CM

## 2023-12-21 DIAGNOSIS — D72.829 LEUKOCYTOSIS, UNSPECIFIED TYPE: ICD-10-CM

## 2023-12-21 DIAGNOSIS — F41.9 ANXIETY: ICD-10-CM

## 2023-12-21 LAB
BASOPHILS # BLD AUTO: 0.09 10*3/MM3 (ref 0–0.2)
BASOPHILS NFR BLD AUTO: 0.7 % (ref 0–1.5)
DEPRECATED RDW RBC AUTO: 40.6 FL (ref 37–54)
EOSINOPHIL # BLD AUTO: 0.33 10*3/MM3 (ref 0–0.4)
EOSINOPHIL NFR BLD AUTO: 2.5 % (ref 0.3–6.2)
ERYTHROCYTE [DISTWIDTH] IN BLOOD BY AUTOMATED COUNT: 12.6 % (ref 12.3–15.4)
FERRITIN SERPL-MCNC: 58.63 NG/ML (ref 13–150)
HCT VFR BLD AUTO: 41.6 % (ref 34–46.6)
HGB BLD-MCNC: 13.4 G/DL (ref 12–15.9)
IMM GRANULOCYTES # BLD AUTO: 0.1 10*3/MM3 (ref 0–0.05)
IMM GRANULOCYTES NFR BLD AUTO: 0.8 % (ref 0–0.5)
IRON 24H UR-MRATE: 51 MCG/DL (ref 37–145)
IRON SATN MFR SERPL: 12 % (ref 20–50)
LYMPHOCYTES # BLD AUTO: 3.54 10*3/MM3 (ref 0.7–3.1)
LYMPHOCYTES NFR BLD AUTO: 27 % (ref 19.6–45.3)
MCH RBC QN AUTO: 28.1 PG (ref 26.6–33)
MCHC RBC AUTO-ENTMCNC: 32.2 G/DL (ref 31.5–35.7)
MCV RBC AUTO: 87.2 FL (ref 79–97)
MONOCYTES # BLD AUTO: 0.58 10*3/MM3 (ref 0.1–0.9)
MONOCYTES NFR BLD AUTO: 4.4 % (ref 5–12)
NEUTROPHILS NFR BLD AUTO: 64.6 % (ref 42.7–76)
NEUTROPHILS NFR BLD AUTO: 8.46 10*3/MM3 (ref 1.7–7)
NRBC BLD AUTO-RTO: 0 /100 WBC (ref 0–0.2)
PLATELET # BLD AUTO: 295 10*3/MM3 (ref 140–450)
PMV BLD AUTO: 9 FL (ref 6–12)
RBC # BLD AUTO: 4.77 10*6/MM3 (ref 3.77–5.28)
TIBC SERPL-MCNC: 417 MCG/DL (ref 298–536)
TRANSFERRIN SERPL-MCNC: 280 MG/DL (ref 200–360)
WBC NRBC COR # BLD AUTO: 13.1 10*3/MM3 (ref 3.4–10.8)

## 2023-12-21 PROCEDURE — 83540 ASSAY OF IRON: CPT

## 2023-12-21 PROCEDURE — 84466 ASSAY OF TRANSFERRIN: CPT

## 2023-12-21 PROCEDURE — 36415 COLL VENOUS BLD VENIPUNCTURE: CPT

## 2023-12-21 PROCEDURE — 85025 COMPLETE CBC W/AUTO DIFF WBC: CPT

## 2023-12-21 PROCEDURE — 82728 ASSAY OF FERRITIN: CPT

## 2023-12-21 PROCEDURE — 99214 OFFICE O/P EST MOD 30 MIN: CPT | Performed by: NURSE PRACTITIONER

## 2023-12-21 RX ORDER — LISINOPRIL 10 MG/1
10 TABLET ORAL DAILY
Qty: 30 TABLET | Refills: 11 | Status: SHIPPED | OUTPATIENT
Start: 2023-12-21

## 2023-12-21 RX ORDER — CEFDINIR 300 MG/1
CAPSULE ORAL
COMMUNITY
Start: 2023-12-12

## 2023-12-21 NOTE — PROGRESS NOTES
"Subjective:     Encounter Date:12/21/2023      Patient ID: Breonna Bellamy is a 48 y.o. female     Chief Complaint:  Hypertension  Associated symptoms include headaches, malaise/fatigue and palpitations. Pertinent negatives include no chest pain, orthopnea, PND or shortness of breath.     Patient presents today for concerns over elevated blood pressure and heart racing. Patient notes she has been under a lot of stress recently. 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 She notes she has been having trouble sleeping. She has been sleep walking. She also notes she has been waking up with a \"splitting headache\". She notes she takes Clonidine and it helps but it often goes back up. She has on Inderal several times a day for her heart racing. She notes she has tried other medicines in the past but it did not help. She notes she has palpitations at time- but this does seem to correlate with the stress. She had a holter monitor in 2021 with Rare PACs and PVCS. Symptoms correlated with sinus and sinus tach and rarely a PAC. She had an echo in 2021 which showed a normal LVEF and mild AI. She notes her BP runs consistently in the high 80s diastolic. She follows with Dr. Hernandez as her PCP.     The following portions of the patient's history were reviewed and updated as appropriate: allergies, current medications, past medical history, past social history, past and problem list.    Allergies   Allergen Reactions    Duexis [Ibuprofen-Famotidine] Anaphylaxis     Buffer in medication is what allergic to    Famotidine Anaphylaxis    Other Swelling     AVOCADO- EAR AND THROAT SWELLING    Sulfa Antibiotics Anaphylaxis    Fish-Derived Products Hives and Nausea And Vomiting    Avocado Hives and Swelling    Procardia [Nifedipine] Swelling and Rash       Current Outpatient Medications:     albuterol (PROVENTIL HFA;VENTOLIN HFA) 108 (90 Base) MCG/ACT inhaler, Inhale 2 puffs Every 4 (Four) Hours " As Needed for Wheezing., Disp: , Rfl:     ALPRAZolam (XANAX) 0.5 MG tablet, Take 1 tablet by mouth 2 (Two) Times a Day As Needed for Anxiety., Disp: , Rfl:     busPIRone (BUSPAR) 10 MG tablet, Take 1 tablet by mouth As Needed., Disp: , Rfl:     Calcium Acetate, Phos Binder, (CALCIUM ACETATE PO), Take  by mouth., Disp: , Rfl:     cefdinir (OMNICEF) 300 MG capsule, , Disp: , Rfl:     cholecalciferol (VITAMIN D3) 1000 units tablet, Take 1 tablet by mouth Daily., Disp: , Rfl:     cloNIDine (CATAPRES) 0.1 MG tablet, TAKE 1 TABLET BY MOUTH 2 (TWO) TIMES A DAY AS NEEDED FOR HIGH BLOOD PRESSURE (SBP > 160)., Disp: 60 tablet, Rfl: 5    dexlansoprazole (DEXILANT) 60 MG capsule, Take 1 capsule by mouth Daily., Disp: , Rfl:     dicyclomine (BENTYL) 20 MG tablet, Take 2 tablets by mouth Every Night., Disp: , Rfl:     HYDROcodone-acetaminophen (Norco) 7.5-325 MG per tablet, Take 1 tablet by mouth Every 6 (Six) Hours As Needed for Moderate Pain., Disp: 16 tablet, Rfl: 0    levocetirizine (XYZAL) 5 MG tablet, , Disp: , Rfl:     ondansetron ODT (ZOFRAN-ODT) 4 MG disintegrating tablet, Place 1 tablet on the tongue Every 6 (Six) Hours As Needed for Nausea., Disp: 12 tablet, Rfl: 0    pantoprazole (PROTONIX) 40 MG EC tablet, , Disp: , Rfl:     propranolol (INDERAL) 80 MG tablet, TAKE (1) TABLET BY MOUTH THREE TIMES DAILY., Disp: 270 tablet, Rfl: 3    sucralfate (CARAFATE) 1 G tablet, Take 1 tablet by mouth As Needed., Disp: , Rfl:     tamsulosin (FLOMAX) 0.4 MG capsule 24 hr capsule, Take 1 capsule by mouth Daily., Disp: 30 capsule, Rfl: 0    lisinopril (PRINIVIL,ZESTRIL) 10 MG tablet, Take 1 tablet by mouth Daily., Disp: 30 tablet, Rfl: 11    Social History     Socioeconomic History    Marital status:    Tobacco Use    Smoking status: Never    Smokeless tobacco: Never   Vaping Use    Vaping Use: Never used   Substance and Sexual Activity    Alcohol use: No     Comment: occasional    Drug use: No    Sexual activity: Yes      Partners: Male     Birth control/protection: None       Review of Systems   Constitutional: Positive for malaise/fatigue. Negative for chills, decreased appetite, fever, weight gain and weight loss.   HENT:  Negative for nosebleeds.    Eyes:  Negative for visual disturbance.   Cardiovascular:  Positive for palpitations. Negative for chest pain, dyspnea on exertion, leg swelling, near-syncope, orthopnea, paroxysmal nocturnal dyspnea and syncope.   Respiratory:  Negative for cough, hemoptysis, shortness of breath and snoring.    Endocrine: Negative for cold intolerance and heat intolerance.   Hematologic/Lymphatic: Negative for bleeding problem. Does not bruise/bleed easily.   Skin:  Negative for rash.   Musculoskeletal:  Negative for back pain and falls.   Gastrointestinal:  Negative for abdominal pain, constipation, diarrhea, heartburn, melena, nausea and vomiting.   Genitourinary:  Negative for hematuria.   Neurological:  Positive for headaches. Negative for dizziness and light-headedness.   Psychiatric/Behavioral:  Negative for altered mental status. The patient is nervous/anxious.    Allergic/Immunologic: Negative for persistent infections.              Objective:     Constitutional:       Appearance: Healthy appearance. Not in distress.   Eyes:      Pupils: Pupils are equal, round, and reactive to light.   HENT:      Head: Normocephalic and atraumatic.      Nose: Nose normal.    Mouth/Throat:      Dentition: Normal.   Pulmonary:      Effort: Pulmonary effort is normal.      Breath sounds: Normal breath sounds.   Chest:      Chest wall: Not tender to palpatation.   Cardiovascular:      PMI at left midclavicular line. Normal rate. Regular rhythm.      Murmurs: There is no murmur.   Pulses:     Intact distal pulses.   Edema:     Peripheral edema absent.   Abdominal:      General: Bowel sounds are normal.      Palpations: Abdomen is soft.   Musculoskeletal: Normal range of motion.      Cervical back: Normal range of  "motion. Skin:     General: Skin is warm and dry.   Neurological:      Mental Status: Alert, oriented to person, place, and time and oriented to person, place and time.   Psychiatric:         Attention and Perception: Attention normal.         Mood and Affect: Mood normal.           Procedures  /90   Pulse 75   Resp 18   Ht 160 cm (63\")   Wt 109 kg (240 lb)   LMP 09/13/2016 Comment: negative hcg noted to chart  SpO2 100%   BMI 42.51 kg/m²   Lab Review:   I have reviewed       Lab Results   Component Value Date    CHLPL 198 10/16/2018    TRIG 229 (H) 10/16/2018    HDL 44 (L) 10/16/2018     (H) 10/16/2018      Results for orders placed during the hospital encounter of 09/08/21    Adult Transthoracic Echo Complete W/ Cont if Necessary Per Protocol    Interpretation Summary  · Left ventricular ejection fraction appears to be 66 - 70%. Left ventricular systolic function is normal.  · Left ventricular diastolic function was normal.  · Mild aortic valve regurgitation is present.  · No evidence of pulmonary hypertension is present.     Assessment:          Diagnosis Plan   1. Primary hypertension        2. Palpitations        3. Anxiety        4. Mild aortic insufficiency               Plan:       Hypertension- blood pressure elevated. Will start Lisinopril 10 mg. Monitor BP and symptoms. Clonidine PRN.   Palpitations- chronic. Worse with stress. If symptoms do not improve with BP control could consider repeating monitor.   Anxiety- high stress jobs. Symptoms worsened with stress. Follow up with PCP.  Mild AI- on echo in September 2021. Will monitor.       "

## 2024-02-26 ENCOUNTER — OFFICE VISIT (OUTPATIENT)
Dept: CARDIOLOGY | Facility: CLINIC | Age: 49
End: 2024-02-26
Payer: COMMERCIAL

## 2024-02-26 VITALS
RESPIRATION RATE: 18 BRPM | HEART RATE: 78 BPM | SYSTOLIC BLOOD PRESSURE: 138 MMHG | DIASTOLIC BLOOD PRESSURE: 82 MMHG | WEIGHT: 237 LBS | BODY MASS INDEX: 41.99 KG/M2 | HEIGHT: 63 IN | OXYGEN SATURATION: 98 %

## 2024-02-26 DIAGNOSIS — I35.1 MILD AORTIC INSUFFICIENCY: ICD-10-CM

## 2024-02-26 DIAGNOSIS — I10 PRIMARY HYPERTENSION: Primary | ICD-10-CM

## 2024-02-26 DIAGNOSIS — R00.2 PALPITATIONS: ICD-10-CM

## 2024-02-26 PROCEDURE — 99214 OFFICE O/P EST MOD 30 MIN: CPT | Performed by: NURSE PRACTITIONER

## 2024-02-26 NOTE — PROGRESS NOTES
Subjective:     Encounter Date:02/26/2024      Patient ID: Breonna Bellamy is a 48 y.o. female     Chief Complaint:  History of Present Illness  Patient presents today for follow up for hypertension. At last OV patient's blood pressure was running high. Lisinopril was started. She also complained of worsening palpitations- with plans to monitor and see if improved with blood pressure improved. She also notes she was under a lot of stress at her work place. She has Mild AI with last echo in 2021. She wore a holter monitor in 2021 with showed Rare PACs and rare PVCs. She follwos with Dr. Hernandez as her PCP. She notes she is feeling much better on Lisinopril. Notes better than she has in a while. No new complaints today. She has been monitoring BP and has been running better.     The following portions of the patient's history were reviewed and updated as appropriate: allergies, current medications, past medical history, past social history, past and problem list.    Allergies   Allergen Reactions    Duexis [Ibuprofen-Famotidine] Anaphylaxis     Buffer in medication is what allergic to    Famotidine Anaphylaxis    Other Swelling     AVOCADO- EAR AND THROAT SWELLING    Sulfa Antibiotics Anaphylaxis    Fish-Derived Products Hives and Nausea And Vomiting    Avocado Hives and Swelling    Procardia [Nifedipine] Swelling and Rash       Current Outpatient Medications:     albuterol (PROVENTIL HFA;VENTOLIN HFA) 108 (90 Base) MCG/ACT inhaler, Inhale 2 puffs Every 4 (Four) Hours As Needed for Wheezing., Disp: , Rfl:     ALPRAZolam (XANAX) 0.5 MG tablet, Take 1 tablet by mouth 2 (Two) Times a Day As Needed for Anxiety., Disp: , Rfl:     busPIRone (BUSPAR) 10 MG tablet, Take 1 tablet by mouth As Needed., Disp: , Rfl:     Calcium Acetate, Phos Binder, (CALCIUM ACETATE PO), Take  by mouth., Disp: , Rfl:     cholecalciferol (VITAMIN D3) 1000 units tablet, Take 1 tablet by mouth Daily., Disp: , Rfl:     cloNIDine (CATAPRES) 0.1 MG tablet,  TAKE 1 TABLET BY MOUTH 2 (TWO) TIMES A DAY AS NEEDED FOR HIGH BLOOD PRESSURE (SBP > 160)., Disp: 60 tablet, Rfl: 5    dexlansoprazole (DEXILANT) 60 MG capsule, Take 1 capsule by mouth Daily., Disp: , Rfl:     dicyclomine (BENTYL) 20 MG tablet, Take 2 tablets by mouth Every Night., Disp: , Rfl:     HYDROcodone-acetaminophen (Norco) 7.5-325 MG per tablet, Take 1 tablet by mouth Every 6 (Six) Hours As Needed for Moderate Pain., Disp: 16 tablet, Rfl: 0    levocetirizine (XYZAL) 5 MG tablet, , Disp: , Rfl:     lisinopril (PRINIVIL,ZESTRIL) 10 MG tablet, Take 1 tablet by mouth Daily., Disp: 30 tablet, Rfl: 11    ondansetron ODT (ZOFRAN-ODT) 4 MG disintegrating tablet, Place 1 tablet on the tongue Every 6 (Six) Hours As Needed for Nausea., Disp: 12 tablet, Rfl: 0    pantoprazole (PROTONIX) 40 MG EC tablet, , Disp: , Rfl:     propranolol (INDERAL) 80 MG tablet, TAKE (1) TABLET BY MOUTH THREE TIMES DAILY., Disp: 270 tablet, Rfl: 3    sucralfate (CARAFATE) 1 G tablet, Take 1 tablet by mouth As Needed., Disp: , Rfl:     tamsulosin (FLOMAX) 0.4 MG capsule 24 hr capsule, Take 1 capsule by mouth Daily., Disp: 30 capsule, Rfl: 0    Social History     Socioeconomic History    Marital status:    Tobacco Use    Smoking status: Never    Smokeless tobacco: Never   Vaping Use    Vaping Use: Never used   Substance and Sexual Activity    Alcohol use: No     Comment: occasional    Drug use: No    Sexual activity: Yes     Partners: Male     Birth control/protection: None       Review of Systems   Constitutional: Negative for chills, decreased appetite, fever, malaise/fatigue, weight gain and weight loss.   HENT:  Negative for nosebleeds.    Eyes:  Negative for visual disturbance.   Cardiovascular:  Positive for palpitations (improved). Negative for chest pain, dyspnea on exertion, leg swelling, near-syncope, orthopnea, paroxysmal nocturnal dyspnea and syncope.   Respiratory:  Negative for cough, hemoptysis, shortness of breath and  "snoring.    Endocrine: Negative for cold intolerance and heat intolerance.   Hematologic/Lymphatic: Negative for bleeding problem. Does not bruise/bleed easily.   Skin:  Negative for rash.   Musculoskeletal:  Negative for back pain and falls.   Gastrointestinal:  Negative for abdominal pain, constipation, diarrhea, heartburn, melena, nausea and vomiting.   Genitourinary:  Negative for hematuria.   Neurological:  Negative for dizziness, headaches and light-headedness.   Psychiatric/Behavioral:  Negative for altered mental status. The patient is nervous/anxious.    Allergic/Immunologic: Negative for persistent infections.              Objective:     Constitutional:       Appearance: Healthy appearance. Not in distress.   Eyes:      Pupils: Pupils are equal, round, and reactive to light.   HENT:      Head: Normocephalic and atraumatic.      Nose: Nose normal.    Mouth/Throat:      Dentition: Normal.   Pulmonary:      Effort: Pulmonary effort is normal.      Breath sounds: Normal breath sounds.   Chest:      Chest wall: Not tender to palpatation.   Cardiovascular:      PMI at left midclavicular line. Normal rate. Regular rhythm.      Murmurs: There is no murmur.   Pulses:     Intact distal pulses.   Edema:     Peripheral edema absent.   Abdominal:      General: Bowel sounds are normal.      Palpations: Abdomen is soft.   Musculoskeletal: Normal range of motion.      Cervical back: Normal range of motion. Skin:     General: Skin is warm and dry.   Neurological:      Mental Status: Alert, oriented to person, place, and time and oriented to person, place and time.   Psychiatric:         Attention and Perception: Attention normal.         Mood and Affect: Mood normal.           Procedures  /82 (BP Location: Right arm, Patient Position: Sitting, Cuff Size: Large Adult)   Pulse 78   Resp 18   Ht 160 cm (63\")   Wt 108 kg (237 lb)   LMP 09/13/2016 Comment: negative hcg noted to chart  SpO2 98%   BMI 41.98 kg/m² "   Lab Review:   I have reviewed       Lab Results   Component Value Date    CHLPL 198 10/16/2018    TRIG 229 (H) 10/16/2018    HDL 44 (L) 10/16/2018     (H) 10/16/2018      Results for orders placed during the hospital encounter of 09/08/21    Adult Transthoracic Echo Complete W/ Cont if Necessary Per Protocol    Interpretation Summary  · Left ventricular ejection fraction appears to be 66 - 70%. Left ventricular systolic function is normal.  · Left ventricular diastolic function was normal.  · Mild aortic valve regurgitation is present.  · No evidence of pulmonary hypertension is present.     Assessment:          Diagnosis Plan   1. Primary hypertension        2. Mild aortic insufficiency        3. Palpitations               Plan:       Hypertension - improved on Lisinopril.   Mild Aortic Insufficiency  - will monitor. Last echo in 2021.   Palpitations - stable. Improved with BP control.     Follow up in 3 months or sooner if needed

## 2024-04-25 ENCOUNTER — OFFICE VISIT (OUTPATIENT)
Dept: CARDIOLOGY | Facility: CLINIC | Age: 49
End: 2024-04-25
Payer: COMMERCIAL

## 2024-04-25 VITALS
SYSTOLIC BLOOD PRESSURE: 128 MMHG | BODY MASS INDEX: 42.17 KG/M2 | DIASTOLIC BLOOD PRESSURE: 84 MMHG | HEART RATE: 78 BPM | WEIGHT: 238 LBS | HEIGHT: 63 IN

## 2024-04-25 DIAGNOSIS — I35.1 MILD AORTIC INSUFFICIENCY: ICD-10-CM

## 2024-04-25 DIAGNOSIS — R07.89 CHEST PAIN, ATYPICAL: Primary | ICD-10-CM

## 2024-04-25 DIAGNOSIS — I35.1 AORTIC VALVE INSUFFICIENCY, ETIOLOGY OF CARDIAC VALVE DISEASE UNSPECIFIED: ICD-10-CM

## 2024-04-25 DIAGNOSIS — E66.01 MORBID OBESITY WITH BMI OF 40.0-44.9, ADULT: ICD-10-CM

## 2024-04-25 DIAGNOSIS — R00.2 PALPITATIONS: ICD-10-CM

## 2024-04-25 DIAGNOSIS — I10 PRIMARY HYPERTENSION: ICD-10-CM

## 2024-04-25 PROCEDURE — 99214 OFFICE O/P EST MOD 30 MIN: CPT | Performed by: EMERGENCY MEDICINE

## 2024-04-25 RX ORDER — PROPRANOLOL HYDROCHLORIDE 80 MG/1
80 TABLET ORAL 3 TIMES DAILY
Qty: 270 TABLET | Refills: 3 | Status: SHIPPED | OUTPATIENT
Start: 2024-04-25

## 2024-04-25 RX ORDER — EPINEPHRINE 0.3 MG/.3ML
INJECTION SUBCUTANEOUS
COMMUNITY
Start: 2024-03-29

## 2024-04-25 NOTE — PROGRESS NOTES
"     Subjective:     Encounter Date:04/25/2024      Patient ID: Breonna Bellamy is a 48 y.o. female.    Chief Complaint:  History of Present Illness  The patient is a 48-year-old female who presents for follow-up of hypertension.    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 Lisinopril 10 mg was initiated nightly, which has significantly improved her condition; however, she experienced an episode of facial and shoulder burning on 04/22/2024 at approximately 6:00 p.m. during a board meeting at school. She suspected a food allergy and applied an ice pack on her face in the school nurse's office. Upon checking her blood pressure, it was recorded as 164/104 mmHg. After taking half a dose of clonidine, performing deep breathing exercises, and applying an ice pack, her blood pressure began to decrease. She is uncertain if these episodes are anxiety-related, a condition she has not experienced in several years. She also reports neck tension. Her blood pressure remains stable today, 04/25/2024. She is currently on clonidine as needed, lisinopril 10 mg, and propranolol 80 mg, taking 1 in the morning and 2 at night.     She has a history of asthma. Her breathing issues are exacerbated by allergies, necessitating the use of her inhaler. She denies experiencing dizziness, lightheadedness, or syncope. She occasionally experiences palpitations, which she describes as a sensation of her heart \"swimming backwards.\" These symptoms are alleviated by deep breathing, coughing, and drinking water. She denies experiencing tachycardia.    She began Inderal at age 26 and has not experienced any panic attacks since. She requests a refill of her Inderal prescription.     She expresses a desire to be proactive in her health. She has a history of elevated white blood cell count, borderline anemia, and kidney stones. " She underwent a stress test in the past.    She has a food allergy. She did not develop seafood allergy until after her hysterectomy.     Her grandfather had a myocardial infarction and had open heart surgery. Her parents have hypertension and are overweight. Her brother has not had any cardiac issues.        Review of Systems   Constitutional: Negative for diaphoresis, fever and malaise/fatigue.   HENT:  Negative for congestion.    Eyes:  Negative for vision loss in left eye and vision loss in right eye.   Cardiovascular:  Negative for chest pain, claudication, dyspnea on exertion, irregular heartbeat, leg swelling, orthopnea, palpitations and syncope.   Respiratory:  Positive for shortness of breath. Negative for cough and wheezing.    Hematologic/Lymphatic: Negative for adenopathy.   Skin:  Negative for rash.   Musculoskeletal:  Negative for joint pain and joint swelling.   Gastrointestinal:  Negative for abdominal pain, diarrhea, nausea and vomiting.   Neurological:  Negative for excessive daytime sleepiness, dizziness, focal weakness, light-headedness, numbness and weakness.   Psychiatric/Behavioral:  Negative for depression. The patient does not have insomnia.            Current Outpatient Medications:     albuterol (PROVENTIL HFA;VENTOLIN HFA) 108 (90 Base) MCG/ACT inhaler, Inhale 2 puffs Every 4 (Four) Hours As Needed for Wheezing., Disp: , Rfl:     ALPRAZolam (XANAX) 0.5 MG tablet, Take 1 tablet by mouth 2 (Two) Times a Day As Needed for Anxiety., Disp: , Rfl:     busPIRone (BUSPAR) 10 MG tablet, Take 1 tablet by mouth As Needed., Disp: , Rfl:     Calcium Acetate, Phos Binder, (CALCIUM ACETATE PO), Take  by mouth., Disp: , Rfl:     cholecalciferol (VITAMIN D3) 1000 units tablet, Take 1 tablet by mouth Daily., Disp: , Rfl:     cloNIDine (CATAPRES) 0.1 MG tablet, TAKE 1 TABLET BY MOUTH 2 (TWO) TIMES A DAY AS NEEDED FOR HIGH BLOOD PRESSURE (SBP > 160)., Disp: 60 tablet, Rfl: 5    dexlansoprazole (DEXILANT) 60  MG capsule, Take 1 capsule by mouth Daily., Disp: , Rfl:     dicyclomine (BENTYL) 20 MG tablet, Take 2 tablets by mouth Every Night., Disp: , Rfl:     EPINEPHrine (EPIPEN) 0.3 MG/0.3ML solution auto-injector injection, , Disp: , Rfl:     HYDROcodone-acetaminophen (Norco) 7.5-325 MG per tablet, Take 1 tablet by mouth Every 6 (Six) Hours As Needed for Moderate Pain., Disp: 16 tablet, Rfl: 0    levocetirizine (XYZAL) 5 MG tablet, , Disp: , Rfl:     lisinopril (PRINIVIL,ZESTRIL) 10 MG tablet, Take 1 tablet by mouth Daily., Disp: 30 tablet, Rfl: 11    ondansetron ODT (ZOFRAN-ODT) 4 MG disintegrating tablet, Place 1 tablet on the tongue Every 6 (Six) Hours As Needed for Nausea., Disp: 12 tablet, Rfl: 0    pantoprazole (PROTONIX) 40 MG EC tablet, , Disp: , Rfl:     propranolol (INDERAL) 80 MG tablet, Take 1 tablet by mouth 3 (Three) Times a Day., Disp: 270 tablet, Rfl: 3    sucralfate (CARAFATE) 1 G tablet, Take 1 tablet by mouth As Needed., Disp: , Rfl:     tamsulosin (FLOMAX) 0.4 MG capsule 24 hr capsule, Take 1 capsule by mouth Daily., Disp: 30 capsule, Rfl: 0       Objective:      Vitals:    04/25/24 1353   BP: 128/84   Pulse: 78     Vitals and nursing note reviewed.   Constitutional:       Appearance: Normal and healthy appearance. Well-developed and not in distress.   Eyes:      Extraocular Movements: Extraocular movements intact.      Pupils: Pupils are equal, round, and reactive to light.   HENT:      Head: Normocephalic and atraumatic.    Mouth/Throat:      Pharynx: Oropharynx is clear.   Neck:      Vascular: JVD normal.      Trachea: Trachea normal.   Pulmonary:      Effort: Pulmonary effort is normal.      Breath sounds: Normal breath sounds. No wheezing. No rhonchi. No rales.   Cardiovascular:      PMI at left midclavicular line. Normal rate. Regular rhythm. Normal S1. Normal S2.       Murmurs: There is a grade 2/6 systolic murmur.      No gallop.  No click. No rub.   Pulses:     Dorsalis pedis: 2+  bilaterally.     Posterior tibial: 2+ bilaterally.  Abdominal:      General: Bowel sounds are normal.      Palpations: Abdomen is soft.      Tenderness: There is no abdominal tenderness.   Musculoskeletal: Normal range of motion.      Cervical back: Normal range of motion and neck supple. Skin:     General: Skin is warm and dry.      Capillary Refill: Capillary refill takes less than 2 seconds.   Feet:      Right foot:      Skin integrity: Skin integrity normal.      Left foot:      Skin integrity: Skin integrity normal.   Neurological:      Mental Status: Alert and oriented to person, place and time.      Sensory: Sensation is intact.      Motor: Motor function is intact.      Coordination: Coordination is intact.   Psychiatric:         Speech: Speech normal.         Behavior: Behavior is cooperative.         Lab Review:           Procedures  Ultrasound from 3 years ago showed a mild leakage of aortic valve.  Holter monitor from 2021 showed rare extra beats, PACs and PVCs.    Assessment/Plan:     Problem List Items Addressed This Visit       Morbid obesity with BMI of 40.0-44.9, adult    HTN (hypertension)    Relevant Medications    EPINEPHrine (EPIPEN) 0.3 MG/0.3ML solution auto-injector injection    propranolol (INDERAL) 80 MG tablet    Palpitations    Mild aortic insufficiency    Relevant Medications    EPINEPHrine (EPIPEN) 0.3 MG/0.3ML solution auto-injector injection    propranolol (INDERAL) 80 MG tablet     Other Visit Diagnoses       Chest pain, atypical    -  Primary    Relevant Orders    Adult Stress Echo W/ Cont or Stress Agent if Necessary Per Protocol    Aortic valve insufficiency, etiology of cardiac valve disease unspecified        Relevant Medications    EPINEPHrine (EPIPEN) 0.3 MG/0.3ML solution auto-injector injection    propranolol (INDERAL) 80 MG tablet    Other Relevant Orders    Adult Transthoracic Echo Complete W/ Cont if Necessary Per Protocol          The patient's blood pressure has shown  improvement since the previous year's assessment. She will continue with her current regimen of lisinopril and Inderal. A prescription refill for Inderal has been provided. Additionally, a stress echocardiogram will be conducted, along with an ultrasound. She will be notified of the results upon their availability. She is scheduled for a follow-up visit in 3 months.    Recommendations/plans:    Transcribed from ambient dictation for Gabriel Hadley DO by Keya Clark.  04/25/24   15:00 CDT    Patient or patient representative verbalized consent to the visit recording.  I have personally performed the services described in this document as transcribed by the above individual, and it is both accurate and complete.  Gabriel Hadley DO  4/28/2024  16:51 CDT

## 2024-05-01 ENCOUNTER — HOSPITAL ENCOUNTER (OUTPATIENT)
Dept: CARDIOLOGY | Facility: HOSPITAL | Age: 49
Discharge: HOME | End: 2024-05-01
Admitting: EMERGENCY MEDICINE
Payer: COMMERCIAL

## 2024-05-01 VITALS
HEART RATE: 71 BPM | BODY MASS INDEX: 42.52 KG/M2 | DIASTOLIC BLOOD PRESSURE: 79 MMHG | WEIGHT: 240 LBS | HEIGHT: 63 IN | SYSTOLIC BLOOD PRESSURE: 118 MMHG

## 2024-05-01 DIAGNOSIS — R07.89 CHEST PAIN, ATYPICAL: ICD-10-CM

## 2024-05-01 LAB
BH CV STRESS BP STAGE 1: NORMAL
BH CV STRESS BP STAGE 2: NORMAL
BH CV STRESS BP STAGE 3: NORMAL
BH CV STRESS DOB - ATROPINE STAGE 3: 1
BH CV STRESS DOSE DOBUTAMINE STAGE 1: 10
BH CV STRESS DOSE DOBUTAMINE STAGE 2: 20
BH CV STRESS DOSE DOBUTAMINE STAGE 3: 30
BH CV STRESS DURATION MIN STAGE 1: 3
BH CV STRESS DURATION MIN STAGE 2: 3
BH CV STRESS DURATION MIN STAGE 3: 2
BH CV STRESS DURATION SEC STAGE 1: 0
BH CV STRESS DURATION SEC STAGE 2: 0
BH CV STRESS DURATION SEC STAGE 3: 46
BH CV STRESS HR STAGE 1: 63
BH CV STRESS HR STAGE 2: 60
BH CV STRESS HR STAGE 3: 131
BH CV STRESS PROTOCOL 1: NORMAL
BH CV STRESS RECOVERY BP: NORMAL MMHG
BH CV STRESS RECOVERY HR: 100 BPM
BH CV STRESS STAGE 1: 1
BH CV STRESS STAGE 2: 2
BH CV STRESS STAGE 3: 3
MAXIMAL PREDICTED HEART RATE: 173 BPM
PERCENT MAX PREDICTED HR: 75.72 %
STRESS BASELINE BP: NORMAL MMHG
STRESS BASELINE HR: 71 BPM
STRESS PERCENT HR: 89 %
STRESS POST EXERCISE DUR MIN: 8 MIN
STRESS POST EXERCISE DUR SEC: 46 SEC
STRESS POST PEAK BP: NORMAL MMHG
STRESS POST PEAK HR: 131 BPM
STRESS TARGET HR: 147 BPM

## 2024-05-01 PROCEDURE — 25010000002 ATROPINE SULFATE: Performed by: EMERGENCY MEDICINE

## 2024-05-01 PROCEDURE — 93017 CV STRESS TEST TRACING ONLY: CPT

## 2024-05-01 PROCEDURE — 93350 STRESS TTE ONLY: CPT

## 2024-05-01 PROCEDURE — 25510000001 PERFLUTREN 6.52 MG/ML SUSPENSION: Performed by: EMERGENCY MEDICINE

## 2024-05-01 PROCEDURE — 25010000002 DOBUTAMINE 250 MG/20ML SOLUTION: Performed by: EMERGENCY MEDICINE

## 2024-05-01 RX ORDER — DOBUTAMINE HYDROCHLORIDE 100 MG/100ML
10-50 INJECTION INTRAVENOUS CONTINUOUS
Status: DISCONTINUED | OUTPATIENT
Start: 2024-05-01 | End: 2024-05-02 | Stop reason: HOSPADM

## 2024-05-01 RX ORDER — METOPROLOL TARTRATE 1 MG/ML
5 INJECTION, SOLUTION INTRAVENOUS ONCE
Status: COMPLETED | OUTPATIENT
Start: 2024-05-01 | End: 2024-05-01

## 2024-05-01 RX ADMIN — PERFLUTREN 8.48 MG: 6.52 INJECTION, SUSPENSION INTRAVENOUS at 11:42

## 2024-05-01 RX ADMIN — ATROPINE SULFATE 1 MG: 0.1 INJECTION INTRAVENOUS at 11:58

## 2024-05-01 RX ADMIN — METOPROLOL TARTRATE 5 MG: 1 INJECTION, SOLUTION INTRAVENOUS at 11:59

## 2024-05-01 RX ADMIN — DOBUTAMINE 10 MCG/KG/MIN: 12.5 INJECTION, SOLUTION, CONCENTRATE INTRAVENOUS at 11:47

## 2024-05-03 DIAGNOSIS — R94.39 ABNORMAL STRESS TEST: Primary | ICD-10-CM

## 2024-05-06 PROBLEM — R94.39 ABNORMAL STRESS TEST: Status: ACTIVE | Noted: 2024-05-03

## 2024-05-06 RX ORDER — PREDNISONE 50 MG/1
50 TABLET ORAL TAKE AS DIRECTED
Qty: 2 TABLET | Refills: 0 | Status: SHIPPED | OUTPATIENT
Start: 2024-05-06

## 2024-05-10 ENCOUNTER — HOSPITAL ENCOUNTER (OUTPATIENT)
Facility: HOSPITAL | Age: 49
Setting detail: HOSPITAL OUTPATIENT SURGERY
Discharge: HOME | End: 2024-05-10
Attending: EMERGENCY MEDICINE | Admitting: EMERGENCY MEDICINE
Payer: COMMERCIAL

## 2024-05-10 VITALS
DIASTOLIC BLOOD PRESSURE: 79 MMHG | BODY MASS INDEX: 42.91 KG/M2 | RESPIRATION RATE: 20 BRPM | WEIGHT: 242.2 LBS | OXYGEN SATURATION: 92 % | HEIGHT: 63 IN | TEMPERATURE: 97.6 F | HEART RATE: 86 BPM | SYSTOLIC BLOOD PRESSURE: 123 MMHG

## 2024-05-10 DIAGNOSIS — R94.39 ABNORMAL STRESS TEST: ICD-10-CM

## 2024-05-10 LAB
ANION GAP SERPL CALCULATED.3IONS-SCNC: 11 MMOL/L (ref 5–15)
BUN SERPL-MCNC: 14 MG/DL (ref 6–20)
BUN/CREAT SERPL: 17.9 (ref 7–25)
CALCIUM SPEC-SCNC: 9.5 MG/DL (ref 8.6–10.5)
CHLORIDE SERPL-SCNC: 103 MMOL/L (ref 98–107)
CO2 SERPL-SCNC: 24 MMOL/L (ref 22–29)
CREAT SERPL-MCNC: 0.78 MG/DL (ref 0.57–1)
DEPRECATED RDW RBC AUTO: 39.2 FL (ref 37–54)
EGFRCR SERPLBLD CKD-EPI 2021: 94.4 ML/MIN/1.73
ERYTHROCYTE [DISTWIDTH] IN BLOOD BY AUTOMATED COUNT: 12.4 % (ref 12.3–15.4)
GLUCOSE SERPL-MCNC: 163 MG/DL (ref 65–99)
HCT VFR BLD AUTO: 43.8 % (ref 34–46.6)
HGB BLD-MCNC: 14.7 G/DL (ref 12–15.9)
INR PPP: 0.9 (ref 0.91–1.09)
MCH RBC QN AUTO: 29.1 PG (ref 26.6–33)
MCHC RBC AUTO-ENTMCNC: 33.6 G/DL (ref 31.5–35.7)
MCV RBC AUTO: 86.7 FL (ref 79–97)
PLATELET # BLD AUTO: 396 10*3/MM3 (ref 140–450)
PMV BLD AUTO: 9.2 FL (ref 6–12)
POTASSIUM SERPL-SCNC: 4.3 MMOL/L (ref 3.5–5.2)
PROTHROMBIN TIME: 12.5 SECONDS (ref 11.8–14.8)
RBC # BLD AUTO: 5.05 10*6/MM3 (ref 3.77–5.28)
SODIUM SERPL-SCNC: 138 MMOL/L (ref 136–145)
WBC NRBC COR # BLD AUTO: 15.7 10*3/MM3 (ref 3.4–10.8)

## 2024-05-10 PROCEDURE — 25010000002 FENTANYL CITRATE (PF) 50 MCG/ML SOLUTION: Performed by: EMERGENCY MEDICINE

## 2024-05-10 PROCEDURE — C1769 GUIDE WIRE: HCPCS | Performed by: EMERGENCY MEDICINE

## 2024-05-10 PROCEDURE — 85027 COMPLETE CBC AUTOMATED: CPT | Performed by: EMERGENCY MEDICINE

## 2024-05-10 PROCEDURE — 99152 MOD SED SAME PHYS/QHP 5/>YRS: CPT | Performed by: EMERGENCY MEDICINE

## 2024-05-10 PROCEDURE — 99153 MOD SED SAME PHYS/QHP EA: CPT | Performed by: EMERGENCY MEDICINE

## 2024-05-10 PROCEDURE — 25010000002 DIPHENHYDRAMINE PER 50 MG: Performed by: EMERGENCY MEDICINE

## 2024-05-10 PROCEDURE — 63710000001 PREDNISONE PER 1 MG: Performed by: EMERGENCY MEDICINE

## 2024-05-10 PROCEDURE — C1760 CLOSURE DEV, VASC: HCPCS | Performed by: EMERGENCY MEDICINE

## 2024-05-10 PROCEDURE — 63710000001 PREDNISONE PER 5 MG: Performed by: EMERGENCY MEDICINE

## 2024-05-10 PROCEDURE — C1894 INTRO/SHEATH, NON-LASER: HCPCS | Performed by: EMERGENCY MEDICINE

## 2024-05-10 PROCEDURE — 85610 PROTHROMBIN TIME: CPT | Performed by: EMERGENCY MEDICINE

## 2024-05-10 PROCEDURE — 93458 L HRT ARTERY/VENTRICLE ANGIO: CPT | Performed by: EMERGENCY MEDICINE

## 2024-05-10 PROCEDURE — 25510000001 IOPAMIDOL PER 1 ML: Performed by: EMERGENCY MEDICINE

## 2024-05-10 PROCEDURE — 25010000002 HEPARIN (PORCINE) 2000-0.9 UNIT/L-% SOLUTION: Performed by: EMERGENCY MEDICINE

## 2024-05-10 PROCEDURE — 25010000002 MIDAZOLAM PER 1 MG: Performed by: EMERGENCY MEDICINE

## 2024-05-10 PROCEDURE — 25010000002 HEPARIN (PORCINE) PER 1000 UNITS: Performed by: EMERGENCY MEDICINE

## 2024-05-10 PROCEDURE — S0260 H&P FOR SURGERY: HCPCS | Performed by: EMERGENCY MEDICINE

## 2024-05-10 PROCEDURE — 25010000002 HEPARIN (PORCINE) 1000-0.9 UT/500ML-% SOLUTION: Performed by: EMERGENCY MEDICINE

## 2024-05-10 PROCEDURE — 80048 BASIC METABOLIC PNL TOTAL CA: CPT | Performed by: EMERGENCY MEDICINE

## 2024-05-10 RX ORDER — NITROGLYCERIN 0.4 MG/1
0.4 TABLET SUBLINGUAL
Status: CANCELLED | OUTPATIENT
Start: 2024-05-10

## 2024-05-10 RX ORDER — VERAPAMIL HYDROCHLORIDE 2.5 MG/ML
INJECTION, SOLUTION INTRAVENOUS
Status: DISCONTINUED | OUTPATIENT
Start: 2024-05-10 | End: 2024-05-10 | Stop reason: HOSPADM

## 2024-05-10 RX ORDER — HEPARIN SODIUM 200 [USP'U]/100ML
INJECTION, SOLUTION INTRAVENOUS
Status: DISCONTINUED | OUTPATIENT
Start: 2024-05-10 | End: 2024-05-10 | Stop reason: HOSPADM

## 2024-05-10 RX ORDER — HEPARIN SODIUM 1000 [USP'U]/ML
INJECTION, SOLUTION INTRAVENOUS; SUBCUTANEOUS
Status: DISCONTINUED | OUTPATIENT
Start: 2024-05-10 | End: 2024-05-10 | Stop reason: HOSPADM

## 2024-05-10 RX ORDER — FENTANYL CITRATE 50 UG/ML
INJECTION, SOLUTION INTRAMUSCULAR; INTRAVENOUS
Status: DISCONTINUED | OUTPATIENT
Start: 2024-05-10 | End: 2024-05-10 | Stop reason: HOSPADM

## 2024-05-10 RX ORDER — LIDOCAINE HYDROCHLORIDE 20 MG/ML
INJECTION, SOLUTION INFILTRATION; PERINEURAL
Status: DISCONTINUED | OUTPATIENT
Start: 2024-05-10 | End: 2024-05-10 | Stop reason: HOSPADM

## 2024-05-10 RX ORDER — ACETAMINOPHEN 325 MG/1
650 TABLET ORAL EVERY 4 HOURS PRN
Status: CANCELLED | OUTPATIENT
Start: 2024-05-10

## 2024-05-10 RX ORDER — DIPHENHYDRAMINE HYDROCHLORIDE 50 MG/ML
50 INJECTION INTRAMUSCULAR; INTRAVENOUS
Status: DISCONTINUED | OUTPATIENT
Start: 2024-05-10 | End: 2024-05-10 | Stop reason: HOSPADM

## 2024-05-10 RX ORDER — MIDAZOLAM HYDROCHLORIDE 1 MG/ML
INJECTION INTRAMUSCULAR; INTRAVENOUS
Status: DISCONTINUED | OUTPATIENT
Start: 2024-05-10 | End: 2024-05-10 | Stop reason: HOSPADM

## 2024-05-10 RX ORDER — DILTIAZEM HYDROCHLORIDE 120 MG/1
120 CAPSULE, COATED, EXTENDED RELEASE ORAL DAILY
Qty: 90 CAPSULE | Refills: 3 | Status: SHIPPED | OUTPATIENT
Start: 2024-05-10

## 2024-05-10 RX ORDER — DIPHENHYDRAMINE HCL 50 MG
50 CAPSULE ORAL
Status: DISCONTINUED | OUTPATIENT
Start: 2024-05-10 | End: 2024-05-10 | Stop reason: HOSPADM

## 2024-05-10 RX ORDER — DIPHENHYDRAMINE HYDROCHLORIDE 50 MG/ML
INJECTION INTRAMUSCULAR; INTRAVENOUS
Status: DISCONTINUED | OUTPATIENT
Start: 2024-05-10 | End: 2024-05-10 | Stop reason: HOSPADM

## 2024-05-10 RX ORDER — SODIUM CHLORIDE 9 MG/ML
100 INJECTION, SOLUTION INTRAVENOUS CONTINUOUS
Status: CANCELLED | OUTPATIENT
Start: 2024-05-10

## 2024-05-10 RX ADMIN — PREDNISONE 50 MG: 20 TABLET ORAL at 10:27

## 2024-05-10 NOTE — Clinical Note
No waste of fentanyl or versed as 100 meq of fentanyl and 5mg of versed were given to pt.  Verified with Margarito bailey RN and noted in [employer].

## 2024-05-10 NOTE — OP NOTE
[facility]      Date of procedure: 5/10/2024     Procedures performed:     1.  Access to the right radial artery via modified Seldinger technique and ultrasound guidance  2.  Patent hemostasis achieved in the right radial artery access site using a TR band  3.  Access to the right femoral artery using a modified Seldinger technique and ultrasound guidance  4.  Patent hemostasis achieved in the right femoral artery access site using a 6 [place] Angio-Seal closure device  5.  Left heart catheterization with retrograde crossing of the ventricle left ventricle pressures were recorded  6.  LV ventriculography  7.  Selective bilateral coronary angiography  8.  Conscious sedation with continuous hemodynamic monitoring for 40 minutes          Risk, Benefits, and Alternatives discussed with the patient and/or family.  Plan is for moderate sedation and the patient agrees to proceed with the procedure.  An immediate assessment was done prior to the administration of moderate sedation     Indication: Abnormal stress test in the LAD distribution  Premedication: Versed, fentanyl  Contrast: Isovue 105 cc  Radiation: Flouro time= 6.3 minutes. Air Kerma= 475 mGy  Catheters: 6Fr TIG, JL 3.5, 6Fr JR4, 6Fr angled pigtail  \    Procedural details:    The patient was brought to the cath lab and prepped and draped in the usual fashion.  Access was obtained in the right radial artery via modified Seldinger technique and ultrasound guidance.  A 6 [place] Islander sheath was placed into the artery and flushed.  We attempted to perform a left heart catheterization via this approach the patient had significant vasospasm in her right arm that caused significant problems with advancing the catheter.  Patent hemostasis was achieved in the right radial artery access site using a TR band.  Next, access was obtained in the right femoral artery using a modified Seldinger technique and ultrasound guidance.  A 6 [place] Islander sheath was placed  into the artery and flushed.  Next, a JR 4 catheter was inserted and used to engage the right and selective right coronary angiography performed in multiple views.  We then inserted a JL 3 5 which was used to engage the left main and selective left coronary angiography was performed multiple views.  Next, pigtail catheter was inserted and used to cross aortic valve to the left ventricle pressure recorded LV ventriculography was performed.  This catheter was then pulled back cross aortic valve and again pressures were recorded.  Everything was then withdrawn through the sheath and the sheath was flushed.  Patent hemostasis was achieved in the right femoral artery access site using a 6 [place] Angio-Seal closure device.  Patient tolerated the procedure well without any complications.  She left the Cath Lab in stable condition.    I supervised the administration of conscious sedation by nursing staff throughout the case.  First dose was given at 1120 and the end of my face-to-face encounter was at 1200, accounting for a total of 40 minutes of supervision.  During the case, continuous pulse oximetry, heart rate, blood pressure, and patient status were monitored.     Findings:    Hemodynamics:    Aortic: 128/98 mmHg  LV: 140/11 mmHg  LVEDP: 33 mmHg    Left ventriculography:  1. The contractility of the left ventricle is normal.  Estimated ejection fraction 65%.  2.  No significant gradient across aortic valve on pullback    Selective coronary angiography:     Left Main: Large-caliber vessel that bifurcates into the LAD and left circumflex coronary arteries, no angiographic evidence of stenosis, BRENDA-3 flow    LAD: Large-caliber vessel with significant myocardial bridging versus vasospasm in the mid vessel, remainder the vessel has no angiographic evidence of stenosis, BRENDA-3 flow    Diagonals: First diagonal is large caliber with no significant disease    Left circumflex: Large-caliber vessel with no angiographic evidence  of stenosis, BRENDA-3 flow    There is 1 moderate caliber obtuse marginal branch with no significant disease    Right coronary artery: Large-caliber vessel with 10 to 20% stenosis in the proximal segment, the remainder the vessel has no angiographic evidence of stenosis, BRENDA-3 flow    PDA/LEONARDO: Both are moderate caliber with no significant disease      Estimated Blood Loss: 10 cc    Specimens: None    Complications: None       Impression:  1.  Mild, nonobstructive coronary artery disease as described above  2.  Myocardial bridging versus significant vasospasm in the mid LAD which is the likely etiology for patient's symptoms and abnormal stress test  3.  Hypertension  4.  Mild aortic insufficiency  5.  Palpitations    Plan:   1.  Start the patient on calcium channel blocker with Cardizem 120 mg daily for the bridging versus vasospasm in the mid LAD to hopefully control her symptoms moving forward  2.  Monitor post procedure and discharge home later today  3.  Follow-up with me in the office in 3 months to make sure her symptoms have resolved or significantly improved    Gabriel Hadley, DO  Interventional cardiology  [facility]  May 10, 2024

## 2024-05-10 NOTE — H&P
"Patient seen and examined at bedside.  The history and physical have not changed as below.    Risk, benefits and alternatives were explained to the patient and she wishes to proceed.    We will be performing a diagnostic left heart catheterization today with possible intervention based on findings.        Subjective:      Encounter Date:04/25/2024        Subjective  Patient ID: Breonna Bellamy is a 48 y.o. female.     Chief Complaint:  History of Present Illness  The patient is a 48-year-old female who presents for follow-up of hypertension.     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 Lisinopril 10 mg was initiated nightly, which has significantly improved her condition; however, she experienced an episode of facial and shoulder burning on 04/22/2024 at approximately 6:00 p.m. during a board meeting at school. She suspected a food allergy and applied an ice pack on her face in the school nurse's office. Upon checking her blood pressure, it was recorded as 164/104 mmHg. After taking half a dose of clonidine, performing deep breathing exercises, and applying an ice pack, her blood pressure began to decrease. She is uncertain if these episodes are anxiety-related, a condition she has not experienced in several years. She also reports neck tension. Her blood pressure remains stable today, 04/25/2024. She is currently on clonidine as needed, lisinopril 10 mg, and propranolol 80 mg, taking 1 in the morning and 2 at night.      She has a history of asthma. Her breathing issues are exacerbated by allergies, necessitating the use of her inhaler. She denies experiencing dizziness, lightheadedness, or syncope. She occasionally experiences palpitations, which she describes as a sensation of her heart \"swimming backwards.\" These symptoms are alleviated by deep breathing, coughing, and drinking " water. She denies experiencing tachycardia.     She began Inderal at age 26 and has not experienced any panic attacks since. She requests a refill of her Inderal prescription.      She expresses a desire to be proactive in her health. She has a history of elevated white blood cell count, borderline anemia, and kidney stones. She underwent a stress test in the past.     She has a food allergy. She did not develop seafood allergy until after her hysterectomy.      Her grandfather had a myocardial infarction and had open heart surgery. Her parents have hypertension and are overweight. Her brother has not had any cardiac issues.           Review of Systems   Constitutional: Negative for diaphoresis, fever and malaise/fatigue.   HENT:  Negative for congestion.    Eyes:  Negative for vision loss in left eye and vision loss in right eye.   Cardiovascular:  Negative for chest pain, claudication, dyspnea on exertion, irregular heartbeat, leg swelling, orthopnea, palpitations and syncope.   Respiratory:  Positive for shortness of breath. Negative for cough and wheezing.    Hematologic/Lymphatic: Negative for adenopathy.   Skin:  Negative for rash.   Musculoskeletal:  Negative for joint pain and joint swelling.   Gastrointestinal:  Negative for abdominal pain, diarrhea, nausea and vomiting.   Neurological:  Negative for excessive daytime sleepiness, dizziness, focal weakness, light-headedness, numbness and weakness.   Psychiatric/Behavioral:  Negative for depression. The patient does not have insomnia.               Current Medications      Current Outpatient Medications:     albuterol (PROVENTIL HFA;VENTOLIN HFA) 108 (90 Base) MCG/ACT inhaler, Inhale 2 puffs Every 4 (Four) Hours As Needed for Wheezing., Disp: , Rfl:     ALPRAZolam (XANAX) 0.5 MG tablet, Take 1 tablet by mouth 2 (Two) Times a Day As Needed for Anxiety., Disp: , Rfl:     busPIRone (BUSPAR) 10 MG tablet, Take 1 tablet by mouth As Needed., Disp: , Rfl:      Calcium Acetate, Phos Binder, (CALCIUM ACETATE PO), Take  by mouth., Disp: , Rfl:     cholecalciferol (VITAMIN D3) 1000 units tablet, Take 1 tablet by mouth Daily., Disp: , Rfl:     cloNIDine (CATAPRES) 0.1 MG tablet, TAKE 1 TABLET BY MOUTH 2 (TWO) TIMES A DAY AS NEEDED FOR HIGH BLOOD PRESSURE (SBP > 160)., Disp: 60 tablet, Rfl: 5    dexlansoprazole (DEXILANT) 60 MG capsule, Take 1 capsule by mouth Daily., Disp: , Rfl:     dicyclomine (BENTYL) 20 MG tablet, Take 2 tablets by mouth Every Night., Disp: , Rfl:     EPINEPHrine (EPIPEN) 0.3 MG/0.3ML solution auto-injector injection, , Disp: , Rfl:     HYDROcodone-acetaminophen (Norco) 7.5-325 MG per tablet, Take 1 tablet by mouth Every 6 (Six) Hours As Needed for Moderate Pain., Disp: 16 tablet, Rfl: 0    levocetirizine (XYZAL) 5 MG tablet, , Disp: , Rfl:     lisinopril (PRINIVIL,ZESTRIL) 10 MG tablet, Take 1 tablet by mouth Daily., Disp: 30 tablet, Rfl: 11    ondansetron ODT (ZOFRAN-ODT) 4 MG disintegrating tablet, Place 1 tablet on the tongue Every 6 (Six) Hours As Needed for Nausea., Disp: 12 tablet, Rfl: 0    pantoprazole (PROTONIX) 40 MG EC tablet, , Disp: , Rfl:     propranolol (INDERAL) 80 MG tablet, Take 1 tablet by mouth 3 (Three) Times a Day., Disp: 270 tablet, Rfl: 3    sucralfate (CARAFATE) 1 G tablet, Take 1 tablet by mouth As Needed., Disp: , Rfl:     tamsulosin (FLOMAX) 0.4 MG capsule 24 hr capsule, Take 1 capsule by mouth Daily., Disp: 30 capsule, Rfl: 0              Objective:      Objective      Vitals:     04/25/24 1353   BP: 128/84   Pulse: 78      Vitals and nursing note reviewed.   Constitutional:       Appearance: Normal and healthy appearance. Well-developed and not in distress.   Eyes:      Extraocular Movements: Extraocular movements intact.      Pupils: Pupils are equal, round, and reactive to light.   HENT:      Head: Normocephalic and atraumatic.    Mouth/Throat:      Pharynx: Oropharynx is clear.   Neck:      Vascular: JVD normal.       Trachea: Trachea normal.   Pulmonary:      Effort: Pulmonary effort is normal.      Breath sounds: Normal breath sounds. No wheezing. No rhonchi. No rales.   Cardiovascular:      PMI at left midclavicular line. Normal rate. Regular rhythm. Normal S1. Normal S2.       Murmurs: There is a grade 2/6 systolic murmur.      No gallop.  No click. No rub.   Pulses:     Dorsalis pedis: 2+ bilaterally.     Posterior tibial: 2+ bilaterally.  Abdominal:      General: Bowel sounds are normal.      Palpations: Abdomen is soft.      Tenderness: There is no abdominal tenderness.   Musculoskeletal: Normal range of motion.      Cervical back: Normal range of motion and neck supple. Skin:     General: Skin is warm and dry.      Capillary Refill: Capillary refill takes less than 2 seconds.   Feet:      Right foot:      Skin integrity: Skin integrity normal.      Left foot:      Skin integrity: Skin integrity normal.   Neurological:      Mental Status: Alert and oriented to person, place and time.      Sensory: Sensation is intact.      Motor: Motor function is intact.      Coordination: Coordination is intact.   Psychiatric:         Speech: Speech normal.         Behavior: Behavior is cooperative.            Lab Review:                  ECG 12 Lead     Date/Time: 4/30/2024 1:33 PM  Performed by: Gabriel Hadley DO     Authorized by: Gabriel Hadley DO  Comparison: compared with previous ECG   Rhythm: sinus rhythm  Rate: normal  Conduction: conduction normal  QRS axis: normal     Clinical impression: normal ECG           Ultrasound from 3 years ago showed a mild leakage of aortic valve.  Holter monitor from 2021 showed rare extra beats, PACs and PVCs.     Assessment/Plan:      Problem List Items Addressed This Visit         Morbid obesity with BMI of 40.0-44.9, adult     HTN (hypertension)     Relevant Medications     EPINEPHrine (EPIPEN) 0.3 MG/0.3ML solution auto-injector injection     propranolol (INDERAL) 80 MG  tablet     Palpitations     Mild aortic insufficiency     Relevant Medications     EPINEPHrine (EPIPEN) 0.3 MG/0.3ML solution auto-injector injection     propranolol (INDERAL) 80 MG tablet      Other Visit Diagnoses         Chest pain, atypical    -  Primary     Relevant Orders     Adult Stress Echo W/ Cont or Stress Agent if Necessary Per Protocol     Aortic valve insufficiency, etiology of cardiac valve disease unspecified         Relevant Medications     EPINEPHrine (EPIPEN) 0.3 MG/0.3ML solution auto-injector injection     propranolol (INDERAL) 80 MG tablet     Other Relevant Orders     Adult Transthoracic Echo Complete W/ Cont if Necessary Per Protocol             The patient's blood pressure has shown improvement since the previous year's assessment. She will continue with her current regimen of lisinopril and Inderal. A prescription refill for Inderal has been provided. Additionally, a stress echocardiogram will be conducted, along with an ultrasound. She will be notified of the results upon their availability. She is scheduled for a follow-up visit in 3 months.     Recommendations/plans:     Transcribed from ambient dictation for Gabriel Hadley DO by Keya Clark.  04/25/24   15:00 CDT     Patient or patient representative verbalized consent to the visit recording.  I have personally performed the services described in this document as transcribed by the above individual, and it is both accurate and complete.  Gabriel Hadley DO  4/28/2024  16:51 CDT

## 2024-05-10 NOTE — Clinical Note
The right DP pulse is +1. The right PT pulse is +1. The right radial pulse is +2. The right ulnar pulse is +2.

## 2024-05-23 ENCOUNTER — HOSPITAL ENCOUNTER (OUTPATIENT)
Dept: CARDIOLOGY | Facility: HOSPITAL | Age: 49
Discharge: HOME | End: 2024-05-23
Payer: COMMERCIAL

## 2024-05-23 VITALS
WEIGHT: 242 LBS | DIASTOLIC BLOOD PRESSURE: 79 MMHG | HEIGHT: 63 IN | SYSTOLIC BLOOD PRESSURE: 123 MMHG | BODY MASS INDEX: 42.88 KG/M2

## 2024-05-23 DIAGNOSIS — I35.1 AORTIC VALVE INSUFFICIENCY, ETIOLOGY OF CARDIAC VALVE DISEASE UNSPECIFIED: ICD-10-CM

## 2024-05-23 PROCEDURE — 93306 TTE W/DOPPLER COMPLETE: CPT

## 2024-05-23 PROCEDURE — 93356 MYOCRD STRAIN IMG SPCKL TRCK: CPT

## 2024-05-27 LAB
ASCENDING AORTA: 3 CM
BH CV ECHO LEFT VENTRICLE GLOBAL LONGITUDINAL STRAIN: -18.4 %
BH CV ECHO MEAS - AO MAX PG: 8.2 MMHG
BH CV ECHO MEAS - AO MEAN PG: 4.2 MMHG
BH CV ECHO MEAS - AO ROOT DIAM: 3 CM
BH CV ECHO MEAS - AO V2 MAX: 143.4 CM/SEC
BH CV ECHO MEAS - AO V2 VTI: 32.1 CM
BH CV ECHO MEAS - AVA(I,D): 2.8 CM2
BH CV ECHO MEAS - EDV(CUBED): 102.6 ML
BH CV ECHO MEAS - EDV(MOD-SP2): 79.6 ML
BH CV ECHO MEAS - EDV(MOD-SP4): 89.2 ML
BH CV ECHO MEAS - EF(MOD-BP): 58 %
BH CV ECHO MEAS - EF(MOD-SP2): 60.1 %
BH CV ECHO MEAS - EF(MOD-SP4): 56.7 %
BH CV ECHO MEAS - ESV(CUBED): 22.6 ML
BH CV ECHO MEAS - ESV(MOD-SP2): 31.7 ML
BH CV ECHO MEAS - ESV(MOD-SP4): 38.6 ML
BH CV ECHO MEAS - FS: 39.6 %
BH CV ECHO MEAS - IVS/LVPW: 0.9 CM
BH CV ECHO MEAS - IVSD: 0.71 CM
BH CV ECHO MEAS - LA DIMENSION: 4.5 CM
BH CV ECHO MEAS - LAT PEAK E' VEL: 12 CM/SEC
BH CV ECHO MEAS - LV DIASTOLIC VOL/BSA (35-75): 42.6 CM2
BH CV ECHO MEAS - LV MASS(C)D: 112.1 GRAMS
BH CV ECHO MEAS - LV MAX PG: 5.5 MMHG
BH CV ECHO MEAS - LV MEAN PG: 2.6 MMHG
BH CV ECHO MEAS - LV SYSTOLIC VOL/BSA (12-30): 18.4 CM2
BH CV ECHO MEAS - LV V1 MAX: 116.9 CM/SEC
BH CV ECHO MEAS - LV V1 VTI: 25.1 CM
BH CV ECHO MEAS - LVIDD: 4.7 CM
BH CV ECHO MEAS - LVIDS: 2.8 CM
BH CV ECHO MEAS - LVOT AREA: 3.6 CM2
BH CV ECHO MEAS - LVOT DIAM: 2.13 CM
BH CV ECHO MEAS - LVPWD: 0.79 CM
BH CV ECHO MEAS - MED PEAK E' VEL: 10 CM/SEC
BH CV ECHO MEAS - MV A MAX VEL: 64.6 CM/SEC
BH CV ECHO MEAS - MV DEC SLOPE: 426.7 CM/SEC2
BH CV ECHO MEAS - MV DEC TIME: 0.17 SEC
BH CV ECHO MEAS - MV E MAX VEL: 71.6 CM/SEC
BH CV ECHO MEAS - MV E/A: 1.11
BH CV ECHO MEAS - MV MAX PG: 2.8 MMHG
BH CV ECHO MEAS - MV MEAN PG: 1.25 MMHG
BH CV ECHO MEAS - MV P1/2T: 46 MSEC
BH CV ECHO MEAS - MV V2 VTI: 26.1 CM
BH CV ECHO MEAS - MVA(P1/2T): 4.8 CM2
BH CV ECHO MEAS - MVA(VTI): 3.4 CM2
BH CV ECHO MEAS - PA V2 MAX: 98.3 CM/SEC
BH CV ECHO MEAS - PULM A REVS DUR: 0.11 SEC
BH CV ECHO MEAS - PULM A REVS VEL: 21.2 CM/SEC
BH CV ECHO MEAS - RAP SYSTOLE: 3 MMHG
BH CV ECHO MEAS - RV MAX PG: 1.98 MMHG
BH CV ECHO MEAS - RV V1 MAX: 70.3 CM/SEC
BH CV ECHO MEAS - RV V1 VTI: 18.8 CM
BH CV ECHO MEAS - RVDD: 3.1 CM
BH CV ECHO MEAS - SV(LVOT): 89.7 ML
BH CV ECHO MEAS - SV(MOD-SP2): 47.8 ML
BH CV ECHO MEAS - SV(MOD-SP4): 50.6 ML
BH CV ECHO MEAS - SVI(LVOT): 42.8 ML/M2
BH CV ECHO MEAS - SVI(MOD-SP2): 22.8 ML/M2
BH CV ECHO MEAS - SVI(MOD-SP4): 24.1 ML/M2
BH CV ECHO MEAS - TAPSE (>1.6): 2.7 CM
BH CV ECHO MEASUREMENTS AVERAGE E/E' RATIO: 6.51
BH CV XLRA - RV BASE: 2.9 CM
BH CV XLRA - RV LENGTH: 7.5 CM
BH CV XLRA - RV MID: 2.6 CM
BH CV XLRA - TDI S': 16 CM/SEC
LEFT ATRIUM VOLUME INDEX: 33 ML/M2
LEFT ATRIUM VOLUME: 67 ML

## 2024-06-26 NOTE — PROGRESS NOTES
Clinic Progress Note    Patient:  Breonna Bellamy  YOB: 1975  Date of Service: 6/28/2024  MRN: [id number]   Acct:    Primary Care Physician: Antoine Hernandez DO    Televisit    Chief Complaint: Leukocytosis and iron deficiency.     Hematology History:  Ms. Bellamy is a 49 y.o. who returns in follow-up of leukocytosis and iron deficiency. She was on oral iron from 12/10/2018 through 03/01/2021.     DIAGNOSTIC ABNORMALITIES:  Review of labs made available from the referring office date back to 06/13/2017. At that time, hemoglobin 12.5, hematocrit 37.8, MCV 82, platelets 359,000, WBC 13.5 with 66 segs, 27 lymphocytes, 5 monocytes, 2 eosinophils (ANC 8.9 and ALC 3.6). CMP was normal with a GFR of 91, calcium 9.4, total protein 7.0, normal liver enzymes. TSH 1.070 (0.45 - 4.5), T4 of 9.3 (4.5 - 12).   On 10/24/2018, hemoglobin 13, hematocrit 38.8, MCV 85, platelets 368,000, WBC 12.4 with 65 segs, 29 lymphocytes, 4 monocytes, 2 eosinophils (normal differential). CMP again normal with a BUN of 14, creatinine 0.76, GFR 97, calcium 9.4, total protein 7.1, and normal liver enzymes. Serum iron 60, iron saturation 14%, TIBC 434, ferritin 28 (depressed), B12 of 1336, TSH 1.19, T4 of 8.6, T3 of 140 (each within reference range).  Labs, 11/14/2018: Hemoglobin 13.4, hematocrit 41.5, MCV 88.4, platelets 367,000, WBC 12.7 with 61.3 segs (ANC 7.8),31.3 lymphocytes (ALC 4.0), and 7.4 mid cells. CMP is notable for a glucose of 111 otherwise normal with a BUN of 14, creatinine 0.7 (GFR 97.5), calcium 9.0, total protein 7.1, and normal liver enzymes.  (313 - 618). Beta 2 microglobulin 2.24. Serum iron 46, saturation 8.78, ferritin 17% (each depressed), B12 of 1455, folate 9.5, TIBC 524 (each elevated). Urinalysis negative.   Comprehensive blood report, 11/14/2018. Mild Leukocytosis, Favor Reactive. COMPREHENSIVE DIAGNOSIS. Peripheral blood: Mild leukocytosis with a normal relative differential. COMPREHENSIVE COMMENT.  The findings in the peripheral blood favor a reactive cause for the patient's mild leukocytosis. FISH for BCR/ABL and molecular studies for JAK2 V617F and calreticulin mutations were performed for further evaluation and are negative.  Chest x-ray revealed no acute cardiopulmonary disease.   FIDEL, 12/04/2018. Negative  Bone marrow, left iliac crest, smears, clot, and core biopsy, 02/11/2019: Normocellular marrow with maturing trilineage hematopoiesis. Normal female chromosome complement. Peripheral blood: Mild leukocytosis. Microscopic diagnosis: 1. Evaluation of the bone marrow biopsy shows a normocellular marrow for the patient's age estimated at 50%. There appears to be maturing trilineage hematopoiesis. There is no increase in blasts population. Megakaryocytes are present and adequate in number with no distinct cytologic atypia. No atypical lymphocytes or plasma cells are seen. 2. Peripheral blood smear shows a mild leukocytosis with a normal differential count. There is no left shift. Circulating blasts are not identified. Red blood cells display normocytic and normochromic indices. Platelets are present in adequate numbers and display normal morphology.     PREVIOUS INTERVENTIONS:  Ferrous sulfate 325 p.o. daily beginning 11/14/2018 through 03/01/2021.    Interval History:  Ms. Bellamy is here for follow-up, she has been doing well.   In the interim from last visit, she underwent a cardiac catheterization on 05/10/2020 after which she was determined to have vasospasm and was started on calcium channel blockers.  She feels well otherwise and has no complaints.  She denies having any fever, chills, chest pain, shortness of breath, abdominal pain, nausea or vomiting.    Objectives:  Vitals:    06/28/24 0935   BP: 138/88   Pulse: 67   Resp: 16   Temp: 97.2 °F (36.2 °C)   SpO2: 98%       ECOG Performance Status: 0  GENERAL: Alert and oriented, no apparent distress  HEENT: No scleral icterus  NECK: Supple  SKIN: No  jaundice    Results:  Lab Results   Component Value Date    WBC 11.92 (H) 06/28/2024    HGB 13.2 06/28/2024    HCT 40.3 06/28/2024    MCV 88.4 06/28/2024     06/28/2024     Lab Results   Component Value Date    GLUCOSE 163 (H) 05/10/2024    BUN 14 05/10/2024    CREATININE 0.78 05/10/2024    EGFR 94.4 05/10/2024    BCR 17.9 05/10/2024    K 4.3 05/10/2024    CO2 24.0 05/10/2024    CALCIUM 9.5 05/10/2024    PROTENTOTREF 7.4 10/16/2018    ALBUMIN 3.90 05/16/2022    BILITOT 0.4 05/16/2022    AST 24 05/16/2022    ALT 22 05/16/2022     Lab Results   Component Value Date    IRON 51 12/21/2023    TIBC 417 12/21/2023    FERRITIN 58.63 12/21/2023     Assessment :  # Leukocytosis, neutrophilic:  - Peripheral blood flow cytometry, 01/27/2022 (above) showed no evidence for abnormal myeloid maturation, increased blast population or lymphoproliferative disorder.  - Bone marrow biopsy, 02/11/2019 (above) was unrevealing.   - Comprehensive blood report, 11/14 (above): reactive leukocytosis favored. Negative JAK2 mutation.      2. Iron deficiency (borderline) without anemia. Intolerant of oral iron (cramps, soft stools)    3. Chronic kidney stones followed by urology.    --ER visit, 05/16/2022 for right flank pain nausea vomiting and hematuria.  --05/16/2022- CT abdomen/pelvis.  3 mm distal right ureteral calculus causing mild right hydroureteronephrosis.  Pronounced right perinephric and periureteral fat stranding and ill-defined fluid.  Recommend clinical correlation to exclude superimposed infection.  Multiple nonobstructing left renal calculi.  Hepatic steatosis.  -- 05/24/2022-seen by urology.  Patient reports passing a kidney stone.  KUB with no stone in the right distal ureter.  Follow-up with Dr. Bañuelos in October for annual visit.  --10/04/2022- Follow-up urology for kidney stones.  Hydration recommended.  Avoid large amounts of tea, cocoa, cola drinks, and fruit juice,      4. History of gastric ulcer   --Prior  "history of hemoptysis.    --History of single episode of hematemesis, \"multiple napkins\" last 02/20/2021 which awoke her from her sleep at 3 am.    --03/11/2021-EGD.  Gastritis biopsy performed.  Small bowel biopsies performed to rule out celiac disease.  Esophageal gastric inlet patch biopsy performed to rule out celiac disease.  Diagnosis: A.small bowel, biopsies: Benign small bowel mucosa with no significant histopathologic abnormality.  B.gastric biopsy: Benign gastric mucosa with mild reactive changes.  C.esophagus, biopsy, esophageal-gastric inlet patch biopsy: Interface of glandular and squamous mucosa with changes of reflux esophagitis.  No intestinal metaplasia or dysplasia identified.  --Remains on PPI, carafate, bentyl and dexilant     5.  Irritable bowel syndrome.   --Last c-scope, 06/19/2019. Normal terminal ileum.  Normal cecum, normal appendiceal orifice and ileocecal valve.  2 small 2 to 3 mm sized polyps.  Random biopsies in the right and left hemicolon performed to rule out microscopic otitis.  Internal hemorrhoids in the rectum.  Plan: Continue WelChol and Bentyl.  Start daily fiber supplement.  Repeat colonoscopy pending biopsy results.  6.  Polycystic ovarian syndrome.   7.  Asthma.  Quiescent  8.  Migratory polyarthralgias, NOS. Fibromyalgia?   9.  Chronic right hip pain. Improved/resolved since surgery (below)  --01/21/2022 --Received a spinal epidural injection [place].  Subjectively no improvement in symptoms.  --12/23/2022-Lumbar MRI--degenerative changes of the lower 2 lumbar levels with a moderate right L5-S1 paracentral disc protrusion resulting in effacement of the right lateral recess and likely compression of the descending right S1 nerve root.  -- 03/01/2022- right L5-S1 microdiscectomy  10.  Fatty liver     Plan:   - We reviewed her labs from today, which show WBC of 11.9, which is stable from before.  - Iron studies are pending from today.  - We discussed the option of trying " gentle iron if she becomes iron deficient down the road, as iron sulfate exacerbate her IBS.  - She continues Protonix, carafate, dexilant, Bentyl and other currently identified medications.   - She continues follow-up with her PCP and consulting specialists.  - As she has recurrent kidney stones, I suggested she follows with nephrology to evaluate possible preventative measures for her recurrent kidney stones.  - Obtain repeat labs in 6 months, and RTC for office visit in 1 year.    Rebecca Wilson MD  6/28/2024

## 2024-06-28 ENCOUNTER — OFFICE VISIT (OUTPATIENT)
Dept: ONCOLOGY | Facility: CLINIC | Age: 49
End: 2024-06-28
Payer: COMMERCIAL

## 2024-06-28 ENCOUNTER — LAB (OUTPATIENT)
Dept: LAB | Facility: HOSPITAL | Age: 49
End: 2024-06-28
Payer: COMMERCIAL

## 2024-06-28 VITALS
HEIGHT: 63 IN | WEIGHT: 241.8 LBS | TEMPERATURE: 97.2 F | OXYGEN SATURATION: 98 % | SYSTOLIC BLOOD PRESSURE: 138 MMHG | DIASTOLIC BLOOD PRESSURE: 88 MMHG | RESPIRATION RATE: 16 BRPM | HEART RATE: 67 BPM | BODY MASS INDEX: 42.84 KG/M2

## 2024-06-28 DIAGNOSIS — D64.9 ANEMIA, UNSPECIFIED TYPE: ICD-10-CM

## 2024-06-28 DIAGNOSIS — E61.1 IRON DEFICIENCY: Primary | ICD-10-CM

## 2024-06-28 DIAGNOSIS — D72.819 LEUKOPENIA, UNSPECIFIED TYPE: ICD-10-CM

## 2024-06-28 DIAGNOSIS — E61.1 IRON DEFICIENCY: ICD-10-CM

## 2024-06-28 LAB
BASOPHILS # BLD AUTO: 0.06 10*3/MM3 (ref 0–0.2)
BASOPHILS NFR BLD AUTO: 0.5 % (ref 0–1.5)
DEPRECATED RDW RBC AUTO: 41.2 FL (ref 37–54)
EOSINOPHIL # BLD AUTO: 0.21 10*3/MM3 (ref 0–0.4)
EOSINOPHIL NFR BLD AUTO: 1.8 % (ref 0.3–6.2)
ERYTHROCYTE [DISTWIDTH] IN BLOOD BY AUTOMATED COUNT: 12.8 % (ref 12.3–15.4)
FERRITIN SERPL-MCNC: 55.56 NG/ML (ref 13–150)
FOLATE SERPL-MCNC: 6.83 NG/ML (ref 4.78–24.2)
HCT VFR BLD AUTO: 40.3 % (ref 34–46.6)
HGB BLD-MCNC: 13.2 G/DL (ref 12–15.9)
IMM GRANULOCYTES # BLD AUTO: 0.12 10*3/MM3 (ref 0–0.05)
IMM GRANULOCYTES NFR BLD AUTO: 1 % (ref 0–0.5)
IRON 24H UR-MRATE: 51 MCG/DL (ref 37–145)
IRON SATN MFR SERPL: 10 % (ref 20–50)
LYMPHOCYTES # BLD AUTO: 3.92 10*3/MM3 (ref 0.7–3.1)
LYMPHOCYTES NFR BLD AUTO: 32.9 % (ref 19.6–45.3)
MCH RBC QN AUTO: 28.9 PG (ref 26.6–33)
MCHC RBC AUTO-ENTMCNC: 32.8 G/DL (ref 31.5–35.7)
MCV RBC AUTO: 88.4 FL (ref 79–97)
MONOCYTES # BLD AUTO: 0.66 10*3/MM3 (ref 0.1–0.9)
MONOCYTES NFR BLD AUTO: 5.5 % (ref 5–12)
NEUTROPHILS NFR BLD AUTO: 58.3 % (ref 42.7–76)
NEUTROPHILS NFR BLD AUTO: 6.95 10*3/MM3 (ref 1.7–7)
NRBC BLD AUTO-RTO: 0 /100 WBC (ref 0–0.2)
PLATELET # BLD AUTO: 284 10*3/MM3 (ref 140–450)
PMV BLD AUTO: 9.4 FL (ref 6–12)
RBC # BLD AUTO: 4.56 10*6/MM3 (ref 3.77–5.28)
TIBC SERPL-MCNC: 492 MCG/DL (ref 298–536)
TRANSFERRIN SERPL-MCNC: 330 MG/DL (ref 200–360)
VIT B12 BLD-MCNC: 558 PG/ML (ref 211–946)
WBC NRBC COR # BLD AUTO: 11.92 10*3/MM3 (ref 3.4–10.8)

## 2024-06-28 PROCEDURE — 82607 VITAMIN B-12: CPT

## 2024-06-28 PROCEDURE — 99213 OFFICE O/P EST LOW 20 MIN: CPT | Performed by: INTERNAL MEDICINE

## 2024-06-28 PROCEDURE — 84466 ASSAY OF TRANSFERRIN: CPT

## 2024-06-28 PROCEDURE — 82746 ASSAY OF FOLIC ACID SERUM: CPT

## 2024-06-28 PROCEDURE — 82728 ASSAY OF FERRITIN: CPT

## 2024-06-28 PROCEDURE — 85025 COMPLETE CBC W/AUTO DIFF WBC: CPT

## 2024-06-28 PROCEDURE — 36415 COLL VENOUS BLD VENIPUNCTURE: CPT

## 2024-06-28 PROCEDURE — 83540 ASSAY OF IRON: CPT

## 2024-07-18 ENCOUNTER — OFFICE VISIT (OUTPATIENT)
Dept: OBSTETRICS AND GYNECOLOGY | Age: 49
End: 2024-07-18
Payer: COMMERCIAL

## 2024-07-18 VITALS
BODY MASS INDEX: 41.32 KG/M2 | SYSTOLIC BLOOD PRESSURE: 122 MMHG | HEIGHT: 64 IN | DIASTOLIC BLOOD PRESSURE: 72 MMHG | WEIGHT: 242 LBS | RESPIRATION RATE: 18 BRPM

## 2024-07-18 DIAGNOSIS — Z12.31 ENCOUNTER FOR SCREENING MAMMOGRAM FOR MALIGNANT NEOPLASM OF BREAST: ICD-10-CM

## 2024-07-18 DIAGNOSIS — N95.1 MENOPAUSAL STATE: ICD-10-CM

## 2024-07-18 DIAGNOSIS — Z01.419 WELL WOMAN EXAM WITH ROUTINE GYNECOLOGICAL EXAM: Primary | ICD-10-CM

## 2024-07-18 RX ORDER — VONOPRAZAN FUMARATE 26.72 MG/1
20 TABLET ORAL DAILY
COMMUNITY

## 2024-07-18 RX ORDER — CITALOPRAM HYDROBROMIDE 10 MG/1
TABLET ORAL
COMMUNITY

## 2024-07-18 NOTE — PROGRESS NOTES
Subjective   Breonna eBllamy is a 49 y.o. female  YOB: 1975        Chief Complaint   Patient presents with    Gynecologic Exam     Patient is here for annual well GYN Exam. Last well GYN exam 23. Last Mammo 23. Hx of DEE/BLS.         Gynecologic Exam  The patient's pertinent negatives include no pelvic pain. Pertinent negatives include no abdominal pain, back pain, constipation, diarrhea, dysuria, fever, frequency, hematuria, nausea, rash, sore throat, urgency or vomiting.       The following portions of the patient's history were reviewed and updated as appropriate: allergies, current medications, past family history, past medical history, past social history, past surgical history, and problem list.    Allergies   Allergen Reactions    Avocado Anaphylaxis    Duexis [Ibuprofen-Famotidine] Anaphylaxis     Buffer in medication is what allergic to ok to take ibruprofen    Famotidine Anaphylaxis    Other Swelling     AVOCADO- EAR AND THROAT SWELLING    Sulfa Antibiotics Anaphylaxis    Fish-Derived Products Hives and Nausea And Vomiting    Shellfish-Derived Products Hives and Itching    Procardia [Nifedipine] Swelling and Rash       Past Medical History:   Diagnosis Date    Anemia     Asthma     Cervical dysplasia     Endometriosis     Hypertension     IBS (irritable bowel syndrome)     In vitro fertilization     Kidney stones     Leukocytosis     LGSIL (low grade squamous intraepithelial dysplasia)     Polycystic ovarian disease     PONV (postoperative nausea and vomiting)     Spontaneous      x2    Tachycardia        Family History   Problem Relation Age of Onset    Anesthesia problems Mother     Hypertension Mother     Anesthesia problems Maternal Grandmother     Hypertension Father     Arrhythmia Father     Colon cancer Paternal Grandmother     Pancreatic cancer Paternal Grandmother     Liver cancer Paternal Grandfather     Heart attack Paternal Grandfather     Ovarian cancer Neg  Hx     Breast cancer Neg Hx     Uterine cancer Neg Hx        Social History     Socioeconomic History    Marital status:    Tobacco Use    Smoking status: Never    Smokeless tobacco: Never   Vaping Use    Vaping status: Never Used   Substance and Sexual Activity    Alcohol use: No     Comment: occasional    Drug use: No    Sexual activity: Yes     Partners: Male     Birth control/protection: None         Current Outpatient Medications:     albuterol (PROVENTIL HFA;VENTOLIN HFA) 108 (90 Base) MCG/ACT inhaler, Inhale 2 puffs Every 4 (Four) Hours As Needed for Wheezing., Disp: , Rfl:     busPIRone (BUSPAR) 10 MG tablet, Take 1 tablet by mouth As Needed., Disp: , Rfl:     Calcium Acetate, Phos Binder, (CALCIUM ACETATE PO), Take  by mouth., Disp: , Rfl:     cholecalciferol (VITAMIN D3) 1000 units tablet, Take 1 tablet by mouth Daily., Disp: , Rfl:     cloNIDine (CATAPRES) 0.1 MG tablet, TAKE 1 TABLET BY MOUTH 2 (TWO) TIMES A DAY AS NEEDED FOR HIGH BLOOD PRESSURE (SBP > 160)., Disp: 60 tablet, Rfl: 5    dicyclomine (BENTYL) 20 MG tablet, Take 2 tablets by mouth Every Night., Disp: , Rfl:     dilTIAZem CD (CARDIZEM CD) 120 MG 24 hr capsule, Take 1 capsule by mouth Daily., Disp: 90 capsule, Rfl: 3    EPINEPHrine (EPIPEN) 0.3 MG/0.3ML solution auto-injector injection, , Disp: , Rfl:     levocetirizine (XYZAL) 5 MG tablet, , Disp: , Rfl:     ondansetron ODT (ZOFRAN-ODT) 4 MG disintegrating tablet, Place 1 tablet on the tongue Every 6 (Six) Hours As Needed for Nausea., Disp: 12 tablet, Rfl: 0    propranolol (INDERAL) 80 MG tablet, Take 1 tablet by mouth 3 (Three) Times a Day., Disp: 270 tablet, Rfl: 3    sucralfate (CARAFATE) 1 G tablet, Take 1 tablet by mouth As Needed., Disp: , Rfl:     Voquezna 20 MG tablet, Take 1 tablet by mouth Daily., Disp: , Rfl:     citalopram (CeleXA) 10 MG tablet, , Disp: , Rfl:     Patient's last menstrual period was 09/13/2016.    Sexual History:           Could not be calculated    Past  Surgical History:   Procedure Laterality Date    BACK SURGERY      L4-L5    BONE MARROW BIOPSY      CARDIAC CATHETERIZATION N/A 05/10/2024    Procedure: Left Heart Cath;  Surgeon: Gabriel Hadley DO;  Location:  [facility];  Service: Cardiovascular;  Laterality: N/A;    CHOLECYSTECTOMY      COLONOSCOPY      COLPOSCOPY W/ BIOPSY / CURETTAGE      CYSTOSCOPY N/A 09/19/2016    Procedure: CYSTOSCOPY;  Surgeon: Jonathan Figueroa MD;  Location:  [facility] OR;  Service:     CYSTOSCOPY ELECTROHYDRAULIC LITHOTRIPSY      EYE SURGERY      LASIK    HYSTEROSCOPY ENDOMETRIAL ABLATION      X2    KIDNEY STONE SURGERY      removed    SALPINGECTOMY Bilateral     SKIN BIOPSY      TOTAL LAPAROSCOPIC HYSTERECTOMY N/A 09/19/2016    Procedure: TOTAL LAPAROSCOPIC HYSTERECTOMY WITH DAVINCI SI ROBOT WITH CYSTO;  Surgeon: Jonathan Figueroa MD;  Location: [facility] OR;  Service:        Review of Systems   Constitutional:  Negative for activity change, appetite change, fatigue, fever, unexpected weight gain and unexpected weight loss.   HENT:  Negative for congestion, ear pain, hearing loss, nosebleeds, rhinorrhea, sore throat, tinnitus and trouble swallowing.    Eyes:  Negative for blurred vision, pain, discharge, itching and visual disturbance.   Respiratory:  Negative for apnea, chest tightness, shortness of breath and wheezing.    Cardiovascular:  Negative for chest pain and leg swelling.   Gastrointestinal:  Negative for abdominal pain, blood in stool, constipation, diarrhea, nausea, vomiting and GERD.   Endocrine: Negative for heat intolerance, polydipsia and polyuria.   Genitourinary: Negative.  Negative for breast lump, decreased libido, difficulty urinating, dyspareunia, dysuria, frequency, genital sores, hematuria, menstrual problem, pelvic pain, urgency, urinary incontinence and vaginal pain.   Musculoskeletal:  Negative for arthralgias, back pain, joint swelling and myalgias.   Skin:  Negative for color change,  rash and skin lesions.   Allergic/Immunologic: Negative for environmental allergies, food allergies and immunocompromised state.   Neurological:  Negative for dizziness, tremors, seizures, syncope, facial asymmetry, numbness and headache.   Hematological:  Negative for adenopathy. Does not bruise/bleed easily.   Psychiatric/Behavioral:  Negative for agitation, hallucinations, sleep disturbance, suicidal ideas and depressed mood. The patient is not nervous/anxious.        Objective   Physical Exam  Vitals and nursing note reviewed.   Constitutional:       Appearance: She is well-developed.   HENT:      Head: Normocephalic and atraumatic.      Right Ear: External ear normal.      Left Ear: External ear normal.   Eyes:      General: No scleral icterus.        Right eye: No discharge.         Left eye: No discharge.      Conjunctiva/sclera: Conjunctivae normal.   Neck:      Thyroid: No thyromegaly.      Vascular: No carotid bruit.   Cardiovascular:      Rate and Rhythm: Regular rhythm.      Heart sounds: Normal heart sounds. No murmur heard.  Pulmonary:      Effort: Pulmonary effort is normal. No respiratory distress.      Breath sounds: Normal breath sounds.   Chest:   Breasts:     Breasts are symmetrical.      Right: No inverted nipple, mass, nipple discharge, skin change or tenderness.      Left: No inverted nipple, mass, nipple discharge, skin change or tenderness.   Abdominal:      General: Bowel sounds are normal. There is no distension.      Palpations: Abdomen is soft. There is no mass.      Tenderness: There is no abdominal tenderness. There is no guarding.      Hernia: No hernia is present. There is no hernia in the left inguinal area.   Genitourinary:     Labia:         Right: No rash, tenderness, lesion or injury.         Left: No rash, tenderness, lesion or injury.       Vagina: Normal. No signs of injury. No vaginal discharge, erythema or bleeding.      Rectum: No mass.      Comments: Cervix, Uterus and  "Adnexa are surgically absent.  Urethra and urethral meatus normal  Bladder, normal no prolapse  Perineum and anus examined and without lesions  Musculoskeletal:         General: No tenderness. Normal range of motion.      Cervical back: Normal range of motion and neck supple.   Lymphadenopathy:      Head:      Right side of head: No submental, submandibular, tonsillar, preauricular, posterior auricular or occipital adenopathy.      Left side of head: No submental, submandibular, tonsillar, preauricular, posterior auricular or occipital adenopathy.      Cervical: No cervical adenopathy.      Right cervical: No superficial, deep or posterior cervical adenopathy.     Left cervical: No superficial, deep or posterior cervical adenopathy.   Skin:     General: Skin is warm and dry.      Findings: No bruising, erythema or rash.   Neurological:      Mental Status: She is alert and oriented to person, place, and time.      Motor: No abnormal muscle tone.      Coordination: Coordination normal.   Psychiatric:         Behavior: Behavior normal.         Thought Content: Thought content normal.         Judgment: Judgment normal.           Vitals:    07/18/24 1355   BP: 122/72   Resp: 18   Weight: 110 kg (242 lb)   Height: 161.3 cm (63.5\")       Diagnoses and all orders for this visit:    1. Well woman exam with routine gynecological exam (Primary)  Comments:  Well woman exam.  History of DEE/BLS.  Mammogram and bone density ordered.    2. Encounter for screening mammogram for malignant neoplasm of breast  -     Mammo screening digital tomosynthesis bilateral w CAD    3. Menopausal state  -     dexa bone density axial        Normal GYN exam. Will have lab work here. Encouraged SBE.  Pt is aware how to do self breast exam and the importance of same. Discussed weight management and importance of maintaining a healthy weight. Discussed Vitamin D intake and the importance of adequate vitamin D for both bone health and a healthy immune " system.  Discussed daily exercise and the importance of same in regards to a healthy heart as well as helping to maintain her weight and improving her mental health.  Body mass index is 42.2 kg/m². Colonoscopy is up to date.  Mamm[facility] will be scheduled at [facility]. Pap smear is done per ASCCP guidelines.    Class 3 Severe Obesity (BMI >=40). Obesity-related health conditions include the following: hypertension and GERD. Obesity is unchanged. BMI is is above average; BMI management plan is completed. We discussed portion control and increasing exercise.             Non-Smoker    MyChart Instructions Given

## 2024-08-21 ENCOUNTER — OFFICE VISIT (OUTPATIENT)
Dept: CARDIOLOGY | Facility: CLINIC | Age: 49
End: 2024-08-21
Payer: COMMERCIAL

## 2024-08-21 VITALS
HEART RATE: 65 BPM | BODY MASS INDEX: 41.99 KG/M2 | WEIGHT: 237 LBS | OXYGEN SATURATION: 99 % | SYSTOLIC BLOOD PRESSURE: 120 MMHG | DIASTOLIC BLOOD PRESSURE: 92 MMHG | HEIGHT: 63 IN

## 2024-08-21 DIAGNOSIS — H53.8 BLURRY VISION, BILATERAL: Primary | ICD-10-CM

## 2024-08-21 DIAGNOSIS — I20.1 CORONARY ARTERY VASOSPASM: ICD-10-CM

## 2024-08-21 DIAGNOSIS — I35.1 MILD AORTIC INSUFFICIENCY: ICD-10-CM

## 2024-08-21 DIAGNOSIS — R00.2 PALPITATIONS: ICD-10-CM

## 2024-08-21 DIAGNOSIS — E66.01 MORBID OBESITY WITH BMI OF 40.0-44.9, ADULT: ICD-10-CM

## 2024-08-21 DIAGNOSIS — I10 PRIMARY HYPERTENSION: ICD-10-CM

## 2024-08-21 PROCEDURE — 99214 OFFICE O/P EST MOD 30 MIN: CPT | Performed by: EMERGENCY MEDICINE

## 2024-08-23 PROBLEM — I20.1 CORONARY ARTERY VASOSPASM: Status: ACTIVE | Noted: 2024-08-23

## 2024-08-23 PROBLEM — H53.8 BLURRY VISION, BILATERAL: Status: ACTIVE | Noted: 2024-08-23

## 2024-08-23 NOTE — PROGRESS NOTES
Subjective:     Encounter Date:08/21/2024      Patient ID: Breonna Bellamy is a 49 y.o. female.    Chief Complaint:  History of Present Illness  History of Present Illness  The patient presents for evaluation of multiple medical concerns.    She experienced a fainting episode in the bathroom, accompanied by vomiting and urinary incontinence, shortly after starting a new medication. Her blood pressure had dropped significantly, with a diastolic reading of 55. She consulted Dr. Hernandez, who discontinued her lisinopril. Since then, she has not had another similar episode. She reports no history of seizures or current chest pain.    She recalls that last year, she would become breathless while running down the [place] to attend to her son, attributing it to her asthma or poor physical condition. However, since starting the new medication, she no longer experiences such breathlessness. She also reports an improvement in her daily endurance. She is currently taking Inderal, one tablet in the morning and two at night, and Cardizem at night.    Following a dobutamine stress test, she experienced severe headaches and changes in her vision, which have not yet returned to normal. She describes her vision as blurry, necessitating the use of driving glasses even during the day. She had LASIK surgery several years ago, which initially improved her vision to better than 20/20, but she notes a decline as she ages. She also mentions a recent episode of severe headache following a stressful day, which was associated with high blood pressure (150/95). She took half a tablet of clonidine, which alleviated her symptoms. She is under the care of an optometrist in [place].        Review of Systems   Constitutional: Negative for diaphoresis, fever and malaise/fatigue.   HENT:  Negative for congestion.    Eyes:  Positive for blurred vision and visual disturbance. Negative for vision loss in left eye and vision loss in right eye.    Cardiovascular:  Positive for syncope. Negative for chest pain, claudication, dyspnea on exertion, irregular heartbeat, leg swelling, orthopnea and palpitations.   Respiratory:  Negative for cough, shortness of breath and wheezing.    Hematologic/Lymphatic: Negative for adenopathy.   Skin:  Negative for rash.   Musculoskeletal:  Negative for joint pain and joint swelling.   Gastrointestinal:  Negative for abdominal pain, diarrhea, nausea and vomiting.   Neurological:  Positive for dizziness and headaches. Negative for excessive daytime sleepiness, focal weakness, light-headedness, numbness and weakness.   Psychiatric/Behavioral:  Negative for depression. The patient does not have insomnia.            Current Outpatient Medications:     albuterol (PROVENTIL HFA;VENTOLIN HFA) 108 (90 Base) MCG/ACT inhaler, Inhale 2 puffs Every 4 (Four) Hours As Needed for Wheezing., Disp: , Rfl:     busPIRone (BUSPAR) 10 MG tablet, Take 1 tablet by mouth As Needed., Disp: , Rfl:     Calcium Acetate, Phos Binder, (CALCIUM ACETATE PO), Take  by mouth., Disp: , Rfl:     cholecalciferol (VITAMIN D3) 1000 units tablet, Take 1 tablet by mouth Daily., Disp: , Rfl:     citalopram (CeleXA) 10 MG tablet, , Disp: , Rfl:     cloNIDine (CATAPRES) 0.1 MG tablet, TAKE 1 TABLET BY MOUTH 2 (TWO) TIMES A DAY AS NEEDED FOR HIGH BLOOD PRESSURE (SBP > 160)., Disp: 60 tablet, Rfl: 5    dicyclomine (BENTYL) 20 MG tablet, Take 2 tablets by mouth Every Night., Disp: , Rfl:     dilTIAZem CD (CARDIZEM CD) 120 MG 24 hr capsule, Take 1 capsule by mouth Daily., Disp: 90 capsule, Rfl: 3    EPINEPHrine (EPIPEN) 0.3 MG/0.3ML solution auto-injector injection, , Disp: , Rfl:     levocetirizine (XYZAL) 5 MG tablet, , Disp: , Rfl:     ondansetron ODT (ZOFRAN-ODT) 4 MG disintegrating tablet, Place 1 tablet on the tongue Every 6 (Six) Hours As Needed for Nausea., Disp: 12 tablet, Rfl: 0    propranolol (INDERAL) 80 MG tablet, Take 1 tablet by mouth 3 (Three) Times a Day.,  Disp: 270 tablet, Rfl: 3    sucralfate (CARAFATE) 1 G tablet, Take 1 tablet by mouth As Needed., Disp: , Rfl:     Voquezna 20 MG tablet, Take 1 tablet by mouth Daily., Disp: , Rfl:        Objective:      Vitals:    08/21/24 1018   BP: 120/92   Pulse: 65   SpO2: 99%     Vitals and nursing note reviewed.   Constitutional:       Appearance: Normal and healthy appearance. Well-developed and not in distress.   Eyes:      Extraocular Movements: Extraocular movements intact.      Pupils: Pupils are equal, round, and reactive to light.   HENT:      Head: Normocephalic and atraumatic.    Mouth/Throat:      Pharynx: Oropharynx is clear.   Neck:      Vascular: JVD normal.      Trachea: Trachea normal.   Pulmonary:      Effort: Pulmonary effort is normal.      Breath sounds: Normal breath sounds. No wheezing. No rhonchi. No rales.   Cardiovascular:      PMI at left midclavicular line. Normal rate. Regular rhythm. Normal S1. Normal S2.       Murmurs: There is no murmur.      No gallop.  No click. No rub.   Pulses:     Dorsalis pedis: 2+ bilaterally.     Posterior tibial: 2+ bilaterally.  Abdominal:      General: Bowel sounds are normal.      Palpations: Abdomen is soft.      Tenderness: There is no abdominal tenderness.   Musculoskeletal: Normal range of motion.      Cervical back: Normal range of motion and neck supple. Skin:     General: Skin is warm and dry.      Capillary Refill: Capillary refill takes less than 2 seconds.   Feet:      Right foot:      Skin integrity: Skin integrity normal.      Left foot:      Skin integrity: Skin integrity normal.   Neurological:      Mental Status: Alert and oriented to person, place and time.      Sensory: Sensation is intact.      Motor: Motor function is intact.      Coordination: Coordination is intact.   Psychiatric:         Speech: Speech normal.         Behavior: Behavior is cooperative.       Physical Exam  Vital Signs  Blood pressure reading is 120/90.    Lab Review:        Procedures  Results  Imaging  Ultrasound showed mild aortic regurgitation. Heart catheterization showed spasm and bridging.    Assessment/Plan:     Problem List Items Addressed This Visit       Morbid obesity with BMI of 40.0-44.9, adult    HTN (hypertension)    Palpitations    Mild aortic insufficiency    Blurry vision, bilateral - Primary    Relevant Orders    Ambulatory Referral to Ophthalmology    Coronary artery vasospasm     Assessment & Plan  1. Hypertension.  Her blood pressure is well-controlled at 120/90, negating the need for lisinopril. The ultrasound revealed mild aortic regurgitation, which is not a cause for concern. The heart catheterization indicated spasms and bridging, but no blockages were detected. Aspirin and cholesterol medication are not required. She is advised to continue her current regimen of Inderal (propranolol) and diltiazem without any changes. A prescription for Cardizem (diltiazem) has been refilled. Should her symptoms recur, she is instructed to contact the office immediately.    2. Blurred vision.  The blurred vision could be a side effect of atropine administered during the dobutamine stress test, which can persist for up to 7 days or longer. A referral to an ophthalmologist has been made for further evaluation. Persistent blurry vision after atropine usage during a stress test 3 months ago will be assessed by the eye specialist.    Follow-up  The patient will follow up in 1 year, or sooner if necessary.      Recommendations/plans:    Transcribed from ambient dictation for Gabriel Hadley DO by Gabriel Hadley DO.  08/23/24   14:29 CDT    Patient or patient representative verbalized consent for the use of Ambient Listening during the visit with  Gabriel Hadley DO for chart documentation. 8/23/2024  14:32 CDT

## 2024-11-27 ENCOUNTER — HOSPITAL ENCOUNTER (OUTPATIENT)
Dept: BONE DENSITY | Facility: HOSPITAL | Age: 49
Discharge: HOME | End: 2024-11-27
Payer: COMMERCIAL

## 2024-11-27 ENCOUNTER — HOSPITAL ENCOUNTER (OUTPATIENT)
Dept: MAMMOGRAPHY | Facility: HOSPITAL | Age: 49
Discharge: HOME | End: 2024-11-27
Payer: COMMERCIAL

## 2024-11-27 LAB
NCCN CRITERIA FLAG: NORMAL
TYRER CUZICK SCORE: 11.1

## 2024-11-27 PROCEDURE — 77063 BREAST TOMOSYNTHESIS BI: CPT

## 2024-11-27 PROCEDURE — 77067 SCR MAMMO BI INCL CAD: CPT

## 2024-11-27 PROCEDURE — 77080 DXA BONE DENSITY AXIAL: CPT

## 2024-12-23 ENCOUNTER — LAB (OUTPATIENT)
Dept: LAB | Facility: HOSPITAL | Age: 49
End: 2024-12-23
Payer: COMMERCIAL

## 2024-12-23 DIAGNOSIS — D64.9 ANEMIA, UNSPECIFIED TYPE: ICD-10-CM

## 2024-12-23 DIAGNOSIS — D72.829 LEUKOCYTOSIS, UNSPECIFIED TYPE: ICD-10-CM

## 2024-12-23 DIAGNOSIS — E61.1 IRON DEFICIENCY: ICD-10-CM

## 2024-12-23 DIAGNOSIS — D72.819 LEUKOPENIA, UNSPECIFIED TYPE: ICD-10-CM

## 2024-12-23 LAB
BASOPHILS # BLD AUTO: 0.06 10*3/MM3 (ref 0–0.2)
BASOPHILS NFR BLD AUTO: 0.5 % (ref 0–1.5)
DEPRECATED RDW RBC AUTO: 41 FL (ref 37–54)
EOSINOPHIL # BLD AUTO: 0.24 10*3/MM3 (ref 0–0.4)
EOSINOPHIL NFR BLD AUTO: 2 % (ref 0.3–6.2)
ERYTHROCYTE [DISTWIDTH] IN BLOOD BY AUTOMATED COUNT: 13.1 % (ref 12.3–15.4)
FERRITIN SERPL-MCNC: 43.11 NG/ML (ref 13–150)
HCT VFR BLD AUTO: 39.2 % (ref 34–46.6)
HGB BLD-MCNC: 13.2 G/DL (ref 12–15.9)
IMM GRANULOCYTES # BLD AUTO: 0.06 10*3/MM3 (ref 0–0.05)
IMM GRANULOCYTES NFR BLD AUTO: 0.5 % (ref 0–0.5)
IRON 24H UR-MRATE: 68 MCG/DL (ref 37–145)
IRON SATN MFR SERPL: 15 % (ref 20–50)
LYMPHOCYTES # BLD AUTO: 4.51 10*3/MM3 (ref 0.7–3.1)
LYMPHOCYTES NFR BLD AUTO: 37.1 % (ref 19.6–45.3)
MCH RBC QN AUTO: 29.2 PG (ref 26.6–33)
MCHC RBC AUTO-ENTMCNC: 33.7 G/DL (ref 31.5–35.7)
MCV RBC AUTO: 86.7 FL (ref 79–97)
MONOCYTES # BLD AUTO: 0.58 10*3/MM3 (ref 0.1–0.9)
MONOCYTES NFR BLD AUTO: 4.8 % (ref 5–12)
NEUTROPHILS NFR BLD AUTO: 55.1 % (ref 42.7–76)
NEUTROPHILS NFR BLD AUTO: 6.7 10*3/MM3 (ref 1.7–7)
NRBC BLD AUTO-RTO: 0 /100 WBC (ref 0–0.2)
PLATELET # BLD AUTO: 285 10*3/MM3 (ref 140–450)
PMV BLD AUTO: 9.3 FL (ref 6–12)
RBC # BLD AUTO: 4.52 10*6/MM3 (ref 3.77–5.28)
TIBC SERPL-MCNC: 454 MCG/DL (ref 298–536)
TRANSFERRIN SERPL-MCNC: 305 MG/DL (ref 200–360)
WBC NRBC COR # BLD AUTO: 12.15 10*3/MM3 (ref 3.4–10.8)

## 2024-12-23 PROCEDURE — 82746 ASSAY OF FOLIC ACID SERUM: CPT

## 2024-12-23 PROCEDURE — 84466 ASSAY OF TRANSFERRIN: CPT

## 2024-12-23 PROCEDURE — 36415 COLL VENOUS BLD VENIPUNCTURE: CPT

## 2024-12-23 PROCEDURE — 82728 ASSAY OF FERRITIN: CPT

## 2024-12-23 PROCEDURE — 82607 VITAMIN B-12: CPT

## 2024-12-23 PROCEDURE — 85025 COMPLETE CBC W/AUTO DIFF WBC: CPT

## 2024-12-23 PROCEDURE — 83540 ASSAY OF IRON: CPT

## 2024-12-24 LAB
FOLATE SERPL-MCNC: 6.43 NG/ML (ref 4.78–24.2)
VIT B12 BLD-MCNC: 491 PG/ML (ref 211–946)

## 2025-06-23 ENCOUNTER — LAB (OUTPATIENT)
Dept: LAB | Facility: HOSPITAL | Age: 50
End: 2025-06-23
Payer: COMMERCIAL

## 2025-06-23 DIAGNOSIS — D64.9 ANEMIA, UNSPECIFIED TYPE: ICD-10-CM

## 2025-06-23 DIAGNOSIS — E61.1 IRON DEFICIENCY: ICD-10-CM

## 2025-06-23 DIAGNOSIS — D72.819 LEUKOPENIA, UNSPECIFIED TYPE: ICD-10-CM

## 2025-06-23 LAB
BASOPHILS # BLD AUTO: 0.06 10*3/MM3 (ref 0–0.2)
BASOPHILS NFR BLD AUTO: 0.5 % (ref 0–1.5)
DEPRECATED RDW RBC AUTO: 42.1 FL (ref 37–54)
EOSINOPHIL # BLD AUTO: 0.22 10*3/MM3 (ref 0–0.4)
EOSINOPHIL NFR BLD AUTO: 1.9 % (ref 0.3–6.2)
ERYTHROCYTE [DISTWIDTH] IN BLOOD BY AUTOMATED COUNT: 13.2 % (ref 12.3–15.4)
FERRITIN SERPL-MCNC: 62.95 NG/ML (ref 13–150)
FOLATE SERPL-MCNC: 10.2 NG/ML (ref 4.78–24.2)
HCT VFR BLD AUTO: 37.4 % (ref 34–46.6)
HGB BLD-MCNC: 12 G/DL (ref 12–15.9)
IMM GRANULOCYTES # BLD AUTO: 0.11 10*3/MM3 (ref 0–0.05)
IMM GRANULOCYTES NFR BLD AUTO: 1 % (ref 0–0.5)
IRON 24H UR-MRATE: 86 MCG/DL (ref 37–145)
IRON SATN MFR SERPL: 21 % (ref 20–50)
LYMPHOCYTES # BLD AUTO: 3.52 10*3/MM3 (ref 0.7–3.1)
LYMPHOCYTES NFR BLD AUTO: 30.4 % (ref 19.6–45.3)
MCH RBC QN AUTO: 28.3 PG (ref 26.6–33)
MCHC RBC AUTO-ENTMCNC: 32.1 G/DL (ref 31.5–35.7)
MCV RBC AUTO: 88.2 FL (ref 79–97)
MONOCYTES # BLD AUTO: 0.53 10*3/MM3 (ref 0.1–0.9)
MONOCYTES NFR BLD AUTO: 4.6 % (ref 5–12)
NEUTROPHILS NFR BLD AUTO: 61.6 % (ref 42.7–76)
NEUTROPHILS NFR BLD AUTO: 7.13 10*3/MM3 (ref 1.7–7)
NRBC BLD AUTO-RTO: 0 /100 WBC (ref 0–0.2)
PLATELET # BLD AUTO: 267 10*3/MM3 (ref 140–450)
PMV BLD AUTO: 9.6 FL (ref 6–12)
RBC # BLD AUTO: 4.24 10*6/MM3 (ref 3.77–5.28)
TIBC SERPL-MCNC: 410 MCG/DL (ref 298–536)
TRANSFERRIN SERPL-MCNC: 275 MG/DL (ref 200–360)
VIT B12 BLD-MCNC: 409 PG/ML (ref 211–946)
WBC NRBC COR # BLD AUTO: 11.57 10*3/MM3 (ref 3.4–10.8)

## 2025-06-23 PROCEDURE — 85025 COMPLETE CBC W/AUTO DIFF WBC: CPT

## 2025-06-23 PROCEDURE — 82746 ASSAY OF FOLIC ACID SERUM: CPT

## 2025-06-23 PROCEDURE — 82607 VITAMIN B-12: CPT

## 2025-06-23 PROCEDURE — 84466 ASSAY OF TRANSFERRIN: CPT

## 2025-06-23 PROCEDURE — 82728 ASSAY OF FERRITIN: CPT

## 2025-06-23 PROCEDURE — 36415 COLL VENOUS BLD VENIPUNCTURE: CPT

## 2025-06-23 PROCEDURE — 83540 ASSAY OF IRON: CPT

## 2025-06-26 ENCOUNTER — OFFICE VISIT (OUTPATIENT)
Dept: ONCOLOGY | Facility: CLINIC | Age: 50
End: 2025-06-26
Payer: COMMERCIAL

## 2025-06-26 VITALS
OXYGEN SATURATION: 98 % | SYSTOLIC BLOOD PRESSURE: 138 MMHG | HEART RATE: 79 BPM | RESPIRATION RATE: 16 BRPM | DIASTOLIC BLOOD PRESSURE: 88 MMHG | WEIGHT: 253.3 LBS | TEMPERATURE: 97.1 F | HEIGHT: 63 IN | BODY MASS INDEX: 44.88 KG/M2

## 2025-06-26 DIAGNOSIS — D72.829 LEUKOCYTOSIS, UNSPECIFIED TYPE: Primary | ICD-10-CM

## 2025-06-26 DIAGNOSIS — E61.1 IRON DEFICIENCY: ICD-10-CM

## 2025-06-26 PROCEDURE — 99214 OFFICE O/P EST MOD 30 MIN: CPT | Performed by: NURSE PRACTITIONER

## 2025-06-26 NOTE — PROGRESS NOTES
[facility]  [address]  [place] [id number] [phone]    Patient Name: Breonna Bellamy  Encounter Date: 06/26/2025   YOB: 1975  Patient Number: [id number]    PROGRESS NOTE     HISTORY OF PRESENT ILLNESS: Breonna Bellamy is a 50 y.o. female followed by this office for leukocytosis and iron deficiency.  She was seen in consultation by Dr. Wilson on 6/28/24.      Bone marrow, 02/11/2019:   -Normocellular marrow with maturing trilineage hematopoiesis.   -Normal female chromosome complement.  .    -There is no increase in blasts population. Megakaryocytes are present and adequate in number with no distinct cytologic atypia. No atypical lymphocytes or plasma cells are seen.   -Peripheral blood smear shows a mild leukocytosis with a normal differential count. There is no left shift. Circulating blasts are not identified. Red blood cells display normocytic and normochromic indices. Platelets are present in adequate numbers and display normal morphology.     She presents to the clinic today for continued follow up.  She is dong well with no acute complaints. She is mourning the loss of her 19 year old son but is in good spirits today.          PAST MEDICAL HISTORY:  ALLERGIES:  Allergies   Allergen Reactions    Avocado Anaphylaxis    Duexis [Ibuprofen-Famotidine] Anaphylaxis     Buffer in medication is what allergic to ok to take ibruprofen    Famotidine Anaphylaxis    Other Swelling     AVOCADO- EAR AND THROAT SWELLING    Sulfa Antibiotics Anaphylaxis    Fish-Derived Products Hives and Nausea And Vomiting    Shellfish-Derived Products Hives and Itching    Procardia [Nifedipine] Swelling and Rash     CURRENT MEDICATIONS:  Outpatient Encounter Medications as of 6/26/2025   Medication Sig Dispense Refill    albuterol (PROVENTIL HFA;VENTOLIN HFA) 108 (90 Base) MCG/ACT inhaler Inhale 2 puffs Every 4 (Four) Hours As  Needed for Wheezing.      busPIRone (BUSPAR) 10 MG tablet Take 1 tablet by mouth As Needed.      Calcium Acetate, Phos Binder, (CALCIUM ACETATE PO) Take  by mouth.      cholecalciferol (VITAMIN D3) 1000 units tablet Take 1 tablet by mouth Daily.      citalopram (CeleXA) 10 MG tablet       cloNIDine (CATAPRES) 0.1 MG tablet TAKE 1 TABLET BY MOUTH 2 (TWO) TIMES A DAY AS NEEDED FOR HIGH BLOOD PRESSURE (SBP > 160). 60 tablet 5    dicyclomine (BENTYL) 20 MG tablet Take 2 tablets by mouth Every Night.      dilTIAZem CD (CARDIZEM CD) 120 MG 24 hr capsule Take 1 capsule by mouth Daily. 90 capsule 3    EPINEPHrine (EPIPEN) 0.3 MG/0.3ML solution auto-injector injection       levocetirizine (XYZAL) 5 MG tablet       ondansetron ODT (ZOFRAN-ODT) 4 MG disintegrating tablet Place 1 tablet on the tongue Every 6 (Six) Hours As Needed for Nausea. 12 tablet 0    propranolol (INDERAL) 80 MG tablet Take 1 tablet by mouth 3 (Three) Times a Day. 270 tablet 3    sucralfate (CARAFATE) 1 G tablet Take 1 tablet by mouth As Needed.      Voquezna 20 MG tablet Take 1 tablet by mouth Daily.       No facility-administered encounter medications on file as of 6/26/2025.     ADULT ILLNESSES:  Patient Active Problem List   Diagnosis Code    Endometriosis N80.9    Laryngopharyngeal reflux K21.9    Pharyngoesophageal dysphagia R13.14    Morbid obesity with BMI of 40.0-44.9, adult E66.01, Z68.41    Leukocytosis D72.829    Iron deficiency E61.1    Asthma J45.909    HTN (hypertension) I10    Lesion of left femur M89.9    Kidney stone N20.0    Palpitations R00.2    S/P lumbar microdiscectomy Z98.890    Mild aortic insufficiency I35.1    Abnormal stress test R94.39    Blurry vision, bilateral H53.8    Coronary artery vasospasm I20.1       HEALTH MAINTENANCE ITEMS:  Health Maintenance Due   Topic Date Due    Pneumococcal Vaccine 0-49 (1 of 2 - PCV) Never done    TDAP/TD VACCINES (1 - Tdap) Never done    HEPATITIS C SCREENING  Never done    ANNUAL PHYSICAL   10/16/2019    COVID-19 Vaccine (4 - 2024-25 season) 09/01/2024       <no information>  Last Completed Colonoscopy            Needs Review       COLORECTAL CANCER SCREENING (COLONOSCOPY - Every 10 Years) Tentatively due on 7/6/2033 07/06/2023  Outside Procedure: COLONOSCOPY    06/19/2019  Outside Procedure: COLONOSCOPY                          Immunization History   Administered Date(s) Administered    COVID-19 (MODERNA) 1st,2nd,3rd Dose Monovalent 01/20/2021, 02/17/2021     Last Completed Mammogram            Upcoming       MAMMOGRAM (Every 2 Years) Next due on 11/27/2026 11/27/2024  Mammo screening digital tomosynthesis bilateral w CAD    11/24/2023  Mammo Screening Digital Tomosynthesis Bilateral With CAD    11/23/2022  Mammo Screening Digital Tomosynthesis Bilateral With CAD    09/17/2021  Mammo Screening Digital Tomosynthesis Bilateral With CAD    06/18/2020  Mammo Screening Digital Tomosynthesis Bilateral With CAD     Only the first 5 history entries have been loaded, but more history exists.                            FAMILY HISTORY:  Family History   Problem Relation Age of Onset    Anesthesia problems Mother     Hypertension Mother     Anesthesia problems Maternal Grandmother     Hypertension Father     Arrhythmia Father     Colon cancer Paternal Grandmother     Pancreatic cancer Paternal Grandmother     Liver cancer Paternal Grandfather     Heart attack Paternal Grandfather     Ovarian cancer Neg Hx     Breast cancer Neg Hx     Uterine cancer Neg Hx      SOCIAL HISTORY:  Social History     Socioeconomic History    Marital status:    Tobacco Use    Smoking status: Never    Smokeless tobacco: Never   Vaping Use    Vaping status: Never Used   Substance and Sexual Activity    Alcohol use: No     Comment: occasional    Drug use: No    Sexual activity: Yes     Partners: Male     Birth control/protection: None       REVIEW OF SYSTEMS:  Review of Systems   Constitutional:  Positive for fatigue.  "Negative for fever.   HENT:  Negative for trouble swallowing.    Respiratory:  Negative for cough and shortness of breath.    Cardiovascular:  Negative for chest pain, palpitations and leg swelling.   Gastrointestinal:  Negative for nausea and vomiting.   Genitourinary:  Negative for hematuria.   Musculoskeletal:  Negative for arthralgias and myalgias.   Skin:  Negative for rash, skin lesions and wound.   Neurological:  Negative for dizziness, syncope, memory problem and confusion.   Psychiatric/Behavioral:  Negative for suicidal ideas and depressed mood. The patient is not nervous/anxious.        /88   Pulse 79   Temp 97.1 °F (36.2 °C)   Resp 16   Ht 160 cm (63\")   Wt 115 kg (253 lb 4.8 oz)   LMP 09/13/2016 Comment: negative hcg noted to chart  SpO2 98%   BMI 44.87 kg/m²  Body surface area is 2.14 meters squared.    Pain Score    06/26/25 0934   PainSc: 0-No pain          Physical Exam  Constitutional:       Appearance: Normal appearance.   HENT:      Head: Normocephalic and atraumatic.   Cardiovascular:      Rate and Rhythm: Normal rate and regular rhythm.   Pulmonary:      Effort: Pulmonary effort is normal.      Breath sounds: Normal breath sounds.   Abdominal:      General: Bowel sounds are normal.      Palpations: Abdomen is soft.   Musculoskeletal:      Right lower leg: No edema.      Left lower leg: No edema.   Skin:     General: Skin is warm and dry.   Neurological:      Mental Status: She is alert and oriented to person, place, and time.   Psychiatric:         Attention and Perception: Attention normal.         Mood and Affect: Mood normal.         Judgment: Judgment normal.       Physical Exam      Breonna NOGUERA Josesito reports a pain score of 0.  Given her pain assessment as noted, treatment options were discussed and the following options were decided upon as a follow-up plan to address the patient's pain: No intervention indicated..      ASSESSMENT / PLAN:  Recent Results (from the past week) "   Iron Profile    Collection Time: 06/23/25 11:32 AM    Specimen: Blood   Result Value Ref Range    Iron 86 37 - 145 mcg/dL    Iron Saturation (TSAT) 21 20 - 50 %    Transferrin 275 200 - 360 mg/dL    TIBC 410 298 - 536 mcg/dL   Ferritin    Collection Time: 06/23/25 11:32 AM    Specimen: Blood   Result Value Ref Range    Ferritin 62.95 13.00 - 150.00 ng/mL   Vitamin B12    Collection Time: 06/23/25 11:32 AM    Specimen: Blood   Result Value Ref Range    Vitamin B-12 409 211 - 946 pg/mL   Folate    Collection Time: 06/23/25 11:32 AM    Specimen: Blood   Result Value Ref Range    Folate 10.20 4.78 - 24.20 ng/mL   CBC Auto Differential    Collection Time: 06/23/25 11:32 AM    Specimen: Blood   Result Value Ref Range    WBC 11.57 (H) 3.40 - 10.80 10*3/mm3    RBC 4.24 3.77 - 5.28 10*6/mm3    Hemoglobin 12.0 12.0 - 15.9 g/dL    Hematocrit 37.4 34.0 - 46.6 %    MCV 88.2 79.0 - 97.0 fL    MCH 28.3 26.6 - 33.0 pg    MCHC 32.1 31.5 - 35.7 g/dL    RDW 13.2 12.3 - 15.4 %    RDW-SD 42.1 37.0 - 54.0 fl    MPV 9.6 6.0 - 12.0 fL    Platelets 267 140 - 450 10*3/mm3    Neutrophil % 61.6 42.7 - 76.0 %    Lymphocyte % 30.4 19.6 - 45.3 %    Monocyte % 4.6 (L) 5.0 - 12.0 %    Eosinophil % 1.9 0.3 - 6.2 %    Basophil % 0.5 0.0 - 1.5 %    Immature Grans % 1.0 (H) 0.0 - 0.5 %    Neutrophils, Absolute 7.13 (H) 1.70 - 7.00 10*3/mm3    Lymphocytes, Absolute 3.52 (H) 0.70 - 3.10 10*3/mm3    Monocytes, Absolute 0.53 0.10 - 0.90 10*3/mm3    Eosinophils, Absolute 0.22 0.00 - 0.40 10*3/mm3    Basophils, Absolute 0.06 0.00 - 0.20 10*3/mm3    Immature Grans, Absolute 0.11 (H) 0.00 - 0.05 10*3/mm3    nRBC 0.0 0.0 - 0.2 /100 WBC       ASSESSMENT  1. Leukocytosis, unspecified type   CBC & Differential (06/23/2025 11:32)     2. Iron deficiency   Iron Profile (06/23/2025 11:32)  Ferritin (06/23/2025 11:32)     Labs stable.  No intervention indicated.    Labs only in 6 months   RTC in 1 year  Continue current medications, follow up, treatment plans per PCP  and any other provider.  Care discussed with patient.  Understanding expressed.  Patient agreeable with plan.         Stephanie Saeed, MEENAKSHI  06/26/2025

## (undated) DEVICE — PINNACLE INTRODUCER SHEATH: Brand: PINNACLE

## (undated) DEVICE — SOLIDIFIER LIQUI LOC PLUS 2000CC

## (undated) DEVICE — ANGIO-SEAL VIP VASCULAR CLOSURE DEVICE: Brand: ANGIO-SEAL

## (undated) DEVICE — GLIDESHEATH SLENDER STAINLESS STEEL KIT: Brand: GLIDESHEATH SLENDER

## (undated) DEVICE — PAD, DEFIB, ADULT, RADIOTRANS, PHYSIO: Brand: MEDLINE

## (undated) DEVICE — DRAPE,ANGIO,BRACH,STERILE,38X44: Brand: MEDLINE

## (undated) DEVICE — PK CATH CARD 30 CA/4

## (undated) DEVICE — MODEL BT2000 P/N 700287-012KIT CONTENTS: MANIFOLD WITH SALINE AND CONTRAST PORTS, SALINE TUBING WITH SPIKE AND HAND SYRINGE, TRANSDUCER: Brand: BT2000 AUTOMATED MANIFOLD KIT

## (undated) DEVICE — CATH DIAG IMPULSE FL3.5 5F 100CM

## (undated) DEVICE — CATH DIAG IMPULSE PIG145 5F 110CM

## (undated) DEVICE — MODEL AT P65, P/N 701554-001KIT CONTENTS: HAND CONTROLLER, 3-WAY HIGH-PRESSURE STOPCOCK WITH ROTATING END AND PREMIUM HIGH-PRESSURE TUBING: Brand: ANGIOTOUCH® KIT

## (undated) DEVICE — SOL IRR NACL 0.9PCT BT 1000ML

## (undated) DEVICE — GW STARTER FXD CORE J .035 3X260CM 3MM

## (undated) DEVICE — CANN NASL ETCO2 LO/FLO A/

## (undated) DEVICE — RADIFOCUS OPTITORQUE ANGIOGRAPHIC CATHETER: Brand: OPTITORQUE

## (undated) DEVICE — KT NDL GUIDE STRL 18GA

## (undated) DEVICE — DRSNG SURESITE WNDW 4X4.5

## (undated) DEVICE — SUP ARMBRD ART/LINE BLU

## (undated) DEVICE — TR BAND RADIAL ARTERY COMPRESSION DEVICE: Brand: TR BAND